# Patient Record
Sex: FEMALE | Race: WHITE
[De-identification: names, ages, dates, MRNs, and addresses within clinical notes are randomized per-mention and may not be internally consistent; named-entity substitution may affect disease eponyms.]

---

## 2017-05-25 ENCOUNTER — HOSPITAL ENCOUNTER (OUTPATIENT)
Dept: HOSPITAL 10 - RAD | Age: 77
Discharge: HOME | End: 2017-05-25
Attending: INTERNAL MEDICINE
Payer: MEDICARE

## 2017-05-25 DIAGNOSIS — R13.12: Primary | ICD-10-CM

## 2017-05-25 PROCEDURE — 92611 MOTION FLUOROSCOPY/SWALLOW: CPT

## 2017-05-25 PROCEDURE — 74230 X-RAY XM SWLNG FUNCJ C+: CPT

## 2017-05-25 NOTE — RADRPT
PROCEDURE:   Video-fluoroscopy swallowing study. 

 

CLINICAL INDICATION:   Dysphagia.

 

TECHNIQUE:   Fluoroscopic guided video swallowing study was done in conjunction with the speech ther
apist. The study was confined to the oral, pharyngeal, and cervical phases of the swallowing mechani
sm. 3.6 minutes of fluoroscopy time was used.

 

COMPARISON:   No prior study is available for comparison.

 

FINDINGS:

There is trace penetration with puree.  Mild penetration is seen with other trials.  There is no tino
dence of aspiration during the exam.

 

IMPRESSION:

1.  Penetration during swallowing.  No aspiration during swallowing.

2.  Please refer to the speech therapist's recommendations for future feedings.  

 

RPTAT: QQ

_____________________________________________ 

.Alin Gayle MD, MD           Date    Time 

Electronically viewed and signed by .Alin Gayle MD, MD on 05/25/2017 18:32 

 

D:  05/25/2017 18:32  T:  05/25/2017 18:32

.R/

## 2018-07-09 ENCOUNTER — HOSPITAL ENCOUNTER (EMERGENCY)
Dept: HOSPITAL 54 - ER | Age: 78
Discharge: HOME | End: 2018-07-09
Payer: MEDICARE

## 2018-07-09 VITALS — DIASTOLIC BLOOD PRESSURE: 84 MMHG | SYSTOLIC BLOOD PRESSURE: 147 MMHG

## 2018-07-09 VITALS — BODY MASS INDEX: 25.44 KG/M2 | WEIGHT: 149 LBS | HEIGHT: 64 IN

## 2018-07-09 DIAGNOSIS — G40.909: Primary | ICD-10-CM

## 2018-07-09 DIAGNOSIS — G30.9: ICD-10-CM

## 2018-07-09 DIAGNOSIS — F02.80: ICD-10-CM

## 2018-07-09 DIAGNOSIS — I10: ICD-10-CM

## 2018-07-09 LAB
BASOPHILS # BLD AUTO: 0 /CMM (ref 0–0.2)
BASOPHILS NFR BLD AUTO: 0.6 % (ref 0–2)
BUN SERPL-MCNC: 15 MG/DL (ref 7–18)
CALCIUM SERPL-MCNC: 8.8 MG/DL (ref 8.5–10.1)
CHLORIDE SERPL-SCNC: 102 MMOL/L (ref 98–107)
CO2 SERPL-SCNC: 29 MMOL/L (ref 21–32)
CREAT SERPL-MCNC: 0.5 MG/DL (ref 0.6–1.3)
EOSINOPHIL NFR BLD AUTO: 0.8 % (ref 0–6)
GLUCOSE SERPL-MCNC: 108 MG/DL (ref 74–106)
HCT VFR BLD AUTO: 43 % (ref 33–45)
HGB BLD-MCNC: 14.6 G/DL (ref 11.5–14.8)
LYMPHOCYTES NFR BLD AUTO: 1.9 /CMM (ref 0.8–4.8)
LYMPHOCYTES NFR BLD AUTO: 24.1 % (ref 20–44)
MCH RBC QN AUTO: 31 PG (ref 26–33)
MCHC RBC AUTO-ENTMCNC: 34 G/DL (ref 31–36)
MCV RBC AUTO: 91 FL (ref 82–100)
MONOCYTES NFR BLD AUTO: 0.4 /CMM (ref 0.1–1.3)
MONOCYTES NFR BLD AUTO: 5.1 % (ref 2–12)
NEUTROPHILS # BLD AUTO: 5.7 /CMM (ref 1.8–8.9)
NEUTROPHILS NFR BLD AUTO: 69.4 % (ref 43–81)
PH UR STRIP: 7.5 [PH] (ref 5–8)
PLATELET # BLD AUTO: 232 /CMM (ref 150–450)
POTASSIUM SERPL-SCNC: 4.2 MMOL/L (ref 3.5–5.1)
RBC # BLD AUTO: 4.68 MIL/UL (ref 4–5.2)
RBC #/AREA URNS HPF: (no result) /HPF (ref 0–2)
RDW COEFFICIENT OF VARIATION: 13.6 (ref 11.5–15)
SODIUM SERPL-SCNC: 136 MMOL/L (ref 136–145)
UROBILINOGEN UR STRIP-MCNC: 0.2 EU/DL
VALPROATE SERPL-MCNC: 57 UG/ML (ref 50–100)
WBC #/AREA URNS HPF: (no result) /HPF (ref 0–3)
WBC NRBC COR # BLD AUTO: 8.1 K/UL (ref 4.3–11)

## 2018-07-09 PROCEDURE — 85025 COMPLETE CBC W/AUTO DIFF WBC: CPT

## 2018-07-09 PROCEDURE — 51702 INSERT TEMP BLADDER CATH: CPT

## 2018-07-09 PROCEDURE — 99285 EMERGENCY DEPT VISIT HI MDM: CPT

## 2018-07-09 PROCEDURE — 70450 CT HEAD/BRAIN W/O DYE: CPT

## 2018-07-09 PROCEDURE — 71045 X-RAY EXAM CHEST 1 VIEW: CPT

## 2018-07-09 PROCEDURE — 80164 ASSAY DIPROPYLACETIC ACD TOT: CPT

## 2018-07-09 PROCEDURE — A4606 OXYGEN PROBE USED W OXIMETER: HCPCS

## 2018-07-09 PROCEDURE — 81001 URINALYSIS AUTO W/SCOPE: CPT

## 2018-07-09 PROCEDURE — Z7610: HCPCS

## 2018-07-09 PROCEDURE — 36415 COLL VENOUS BLD VENIPUNCTURE: CPT

## 2018-07-09 PROCEDURE — 80048 BASIC METABOLIC PNL TOTAL CA: CPT

## 2018-07-09 PROCEDURE — 93005 ELECTROCARDIOGRAM TRACING: CPT

## 2018-07-09 NOTE — NUR
PT COMFORTABLE SLEEPING IN BED. WILL CONTINUE TO MONITOR UNTIL PICKED UP TO BE 
TAKEN BACK TO FACILITY BY [facility]

## 2018-07-09 NOTE — NUR
[id number] FROM HOME FOR AMS, S/P SEIZURE. BS-120. PATIENT NOT IN DISTRESS. SKIN IS 
WARM TO TOUCH AND NON DIAPHORETIC. PATIENT IS AFEBRILE. ON O2 VIA NC. VSS. 
CONNECTED PT TO TELE  MONITOR. SEIZURE PRECAUTION OBSERVED./

## 2018-10-02 ENCOUNTER — HOSPITAL ENCOUNTER (INPATIENT)
Dept: HOSPITAL 54 - ER | Age: 78
LOS: 4 days | Discharge: NURSING, REHAB OR LONG-TERM CARE | DRG: 193 | End: 2018-10-06
Attending: INTERNAL MEDICINE | Admitting: INTERNAL MEDICINE
Payer: MEDICARE

## 2018-10-02 VITALS — HEIGHT: 61 IN | WEIGHT: 139.56 LBS | BODY MASS INDEX: 26.35 KG/M2

## 2018-10-02 DIAGNOSIS — S30.820A: ICD-10-CM

## 2018-10-02 DIAGNOSIS — I10: ICD-10-CM

## 2018-10-02 DIAGNOSIS — F02.80: ICD-10-CM

## 2018-10-02 DIAGNOSIS — J15.9: Primary | ICD-10-CM

## 2018-10-02 DIAGNOSIS — G30.9: ICD-10-CM

## 2018-10-02 DIAGNOSIS — G92: ICD-10-CM

## 2018-10-02 DIAGNOSIS — I95.9: ICD-10-CM

## 2018-10-02 DIAGNOSIS — E86.0: ICD-10-CM

## 2018-10-02 DIAGNOSIS — E87.1: ICD-10-CM

## 2018-10-02 DIAGNOSIS — E88.09: ICD-10-CM

## 2018-10-02 DIAGNOSIS — R79.89: ICD-10-CM

## 2018-10-02 DIAGNOSIS — E87.5: ICD-10-CM

## 2018-10-02 DIAGNOSIS — E44.1: ICD-10-CM

## 2018-10-02 DIAGNOSIS — N39.0: ICD-10-CM

## 2018-10-02 DIAGNOSIS — D72.829: ICD-10-CM

## 2018-10-02 DIAGNOSIS — M81.0: ICD-10-CM

## 2018-10-02 DIAGNOSIS — G40.909: ICD-10-CM

## 2018-10-02 LAB
ALBUMIN SERPL BCP-MCNC: 3.1 G/DL (ref 3.4–5)
ALP SERPL-CCNC: 87 U/L (ref 46–116)
ALT SERPL W P-5'-P-CCNC: 19 U/L (ref 12–78)
APPEARANCE UR: (no result)
APTT PPP: 26 SEC (ref 23–34)
AST SERPL W P-5'-P-CCNC: 16 U/L (ref 15–37)
BASOPHILS # BLD AUTO: 0.1 /CMM (ref 0–0.2)
BASOPHILS NFR BLD AUTO: 1.6 % (ref 0–2)
BILIRUB DIRECT SERPL-MCNC: 0.1 MG/DL (ref 0–0.2)
BILIRUB SERPL-MCNC: 0.2 MG/DL (ref 0.2–1)
BILIRUB UR QL STRIP: NEGATIVE
BUN SERPL-MCNC: 25 MG/DL (ref 7–18)
CALCIUM SERPL-MCNC: 8.5 MG/DL (ref 8.5–10.1)
CHLORIDE SERPL-SCNC: 98 MMOL/L (ref 98–107)
CO2 SERPL-SCNC: 24 MMOL/L (ref 21–32)
COLOR UR: YELLOW
CREAT SERPL-MCNC: 0.5 MG/DL (ref 0.6–1.3)
EOSINOPHIL NFR BLD AUTO: 0.3 % (ref 0–6)
GLUCOSE SERPL-MCNC: 95 MG/DL (ref 74–106)
GLUCOSE UR STRIP-MCNC: NEGATIVE MG/DL
HCT VFR BLD AUTO: 42 % (ref 33–45)
HGB BLD-MCNC: 14.1 G/DL (ref 11.5–14.8)
HGB UR QL STRIP: (no result) ERY/UL
INR PPP: 1.1 (ref 0.87–1.13)
KETONES UR STRIP-MCNC: NEGATIVE MG/DL
LEUKOCYTE ESTERASE UR QL STRIP: (no result)
LYMPHOCYTES NFR BLD AUTO: 2 /CMM (ref 0.8–4.8)
LYMPHOCYTES NFR BLD AUTO: 26.6 % (ref 20–44)
MCHC RBC AUTO-ENTMCNC: 34 G/DL (ref 31–36)
MCV RBC AUTO: 95 FL (ref 82–100)
MONOCYTES NFR BLD AUTO: 0.6 /CMM (ref 0.1–1.3)
MONOCYTES NFR BLD AUTO: 8.2 % (ref 2–12)
NEUTROPHILS # BLD AUTO: 4.9 /CMM (ref 1.8–8.9)
NEUTROPHILS NFR BLD AUTO: 63.3 % (ref 43–81)
NITRITE UR QL STRIP: NEGATIVE
PH UR STRIP: 7 [PH] (ref 5–8)
PLATELET # BLD AUTO: 215 /CMM (ref 150–450)
POTASSIUM SERPL-SCNC: 5.2 MMOL/L (ref 3.5–5.1)
PROT SERPL-MCNC: 6.8 G/DL (ref 6.4–8.2)
PROT UR QL STRIP: NEGATIVE MG/DL
RBC # BLD AUTO: 4.39 MIL/UL (ref 4–5.2)
RBC #/AREA URNS HPF: (no result) /HPF (ref 0–2)
RDW COEFFICIENT OF VARIATION: 13.4 (ref 11.5–15)
SODIUM SERPL-SCNC: 132 MMOL/L (ref 136–145)
TROPONIN I SERPL-MCNC: 0.03 NG/ML (ref 0–0.06)
UROBILINOGEN UR STRIP-MCNC: 0.2 EU/DL
WBC #/AREA URNS HPF: (no result) /HPF (ref 0–3)
WBC NRBC COR # BLD AUTO: 7.7 K/UL (ref 4.3–11)

## 2018-10-02 PROCEDURE — Z7610: HCPCS

## 2018-10-02 PROCEDURE — A4606 OXYGEN PROBE USED W OXIMETER: HCPCS

## 2018-10-02 NOTE — NUR
[facility] C/O FEVER AT HOME, RECENT UTI ON ATB TREATMENT. NO FEVER ON ARRIVAL. 
TYLENOL WAS GIVEN EARLIER TODAY AFTER TEMP OF 99.1. GCS 9-10. SKIN WARM AND DRY 
TO TOUCH. RR EVEN AND UNLABORED. NAD NOTED. PT PLACED ON CARDIAC MONITOR AND 
POX. PT SAFETY AND COMFORT MEASURES IN PLACE. PT ON NC 3L/M FROM FACILITY. MD 
BEDSIDE FOR EVAL

## 2018-10-03 VITALS — SYSTOLIC BLOOD PRESSURE: 87 MMHG | DIASTOLIC BLOOD PRESSURE: 51 MMHG

## 2018-10-03 VITALS — SYSTOLIC BLOOD PRESSURE: 113 MMHG | DIASTOLIC BLOOD PRESSURE: 61 MMHG

## 2018-10-03 VITALS — SYSTOLIC BLOOD PRESSURE: 127 MMHG | DIASTOLIC BLOOD PRESSURE: 80 MMHG

## 2018-10-03 VITALS — DIASTOLIC BLOOD PRESSURE: 64 MMHG | SYSTOLIC BLOOD PRESSURE: 105 MMHG

## 2018-10-03 VITALS — DIASTOLIC BLOOD PRESSURE: 48 MMHG | SYSTOLIC BLOOD PRESSURE: 106 MMHG

## 2018-10-03 VITALS — DIASTOLIC BLOOD PRESSURE: 71 MMHG | SYSTOLIC BLOOD PRESSURE: 107 MMHG

## 2018-10-03 VITALS — DIASTOLIC BLOOD PRESSURE: 80 MMHG | SYSTOLIC BLOOD PRESSURE: 127 MMHG

## 2018-10-03 RX ADMIN — DEXTROSE MONOHYDRATE PRN MLS/HR: 50 INJECTION, SOLUTION INTRAVENOUS at 17:47

## 2018-10-03 RX ADMIN — DEXTROSE MONOHYDRATE PRN MLS/HR: 50 INJECTION, SOLUTION INTRAVENOUS at 02:31

## 2018-10-03 RX ADMIN — DEXTROSE MONOHYDRATE SCH MLS/HR: 50 INJECTION, SOLUTION INTRAVENOUS at 13:11

## 2018-10-03 NOTE — NUR
RN NOTES

 PATIENT STABLE, NO ACUTE RESPIRATORY DISTRESS, V/S STABLE, SCHEDULED MEDICATION 
ADMINISTERED, NEEDS ATTENDED AND ANTICIPATED. ASSIST TURN AND REPOSTION Q 2 HR.  CALL LIGHT 
WITHIN TO REACH, PRIVATE CARE GIVER NEXT TO THE BED. ENDORSED ONCOMING NURSE FOR PLAN OF 
CARE.

## 2018-10-03 NOTE — NUR
rn notes

 RECEIVED PATIENT Ukrainian SPEAKER IN THE BED EYES CLOSE  ALL THE TIME, PATIENT NON VERBAL, 
BUT REDIRECTABLE.PATIENT ON O2-2L NC NO CUTE RESPIRATORY DISTRESS, V/S TAKEN STABLE, NEEDS 
ATTENDED AND ANTICIPATED. SCHEDULED MEDICATION ADMINISTERED. ASSIST TURN AND REPOSTION Q 
2HR, ASSIST EATING, KEEP HOB ELEVATED FOR ASPIRATION PRECAUTION. INFUSING NS AT 75 ML/HR 
INTACT. PATIENT INCONTINENT, APPLIED Z-GUARD. CALL LIGHT WITHIN TO REACH, PRIVATE CARE GIVER 
NEXT TO THE BED. CONTINUED MONITORING.

## 2018-10-03 NOTE — NUR
RN NOTES

PATIENT BACK FROM CT. NO ACUTE RESPIRATORY DISTRESS, CALL LIGHT WITHIN TO REACH,  SAFETY 
PRECAUTION MAINTAINED ALL THE TIME. ASSIST TURN AND REPOSTION.

## 2018-10-03 NOTE — NUR
TELE RN ADMISSION NOTES



Patient came to unit via gurney. Patient's eyes are open but non-verbal. Son with patient. 
Breathing even and unlabored. Not in any distress. On O2 at 2L via NC saturating 98%. 
Patient on tele monitor, sinus rhythm 75. History taken from son as patient not able to 
answer. IV site on right hand g#20 intact and patent. According to son, patient does not 
talk since her dementia worsened. Patient does not ambulate but has physical therapy 
sessions at the nursing home twice a week. She is on pureed, low fat, low cholesterol, thin 
liquids in the nursing home. Pills are crushed and given with applesauce. Medications from 
nursing home entered for reconciliation. PT/ST/CM consult ordered. Son requested to stay 
through the night. Oriented to call bell. Padded side rails for seizure precaution in place. 
Bed in lowest, locked position. Will monitor accordingly

## 2018-10-03 NOTE — NUR
MS RN NOTES

RECEIVED PT IN BED, RESTING COMFORTABLY AT THIS TIME, AROUSABLE, NON VERBAL. NO DISTRESS, NO 
SOB NOTED. DAUGHTER AND PRIVATE CAREGIVER AT BEDSIDE. RESPIRATION IS EVEN AND UNLABORED. IV 
SITE ON RIGHT HAND INTACT AND PATENT, IVF INFUSING WELL. CAREGIVER FEEDING PT AT THIS TIME, 
EDUCATED ON ASPIRATION PRECAUTIONS , VERBALIZED UNDERSTANDING. ALL NEEDS ATTENDED AND MET. 
KEPT COMFORTABLE. SAFETY , ASPIRATION AND SEIZURE PRECAUTION OBSERVED. CALL LIGHT WITHIN 
REACH. WILL CONTINUE TO MONITOR.

## 2018-10-03 NOTE — NUR
TELE RN CLOSING NOTES



Patient still sleeping in bed, non- verbal. Breathing even and unlabored. Not in any 
distress. Son at bedside. Peripheral IV infusing at 75mL/hr. All needs attended to. Turned 
and reposition as needed. Tele monitor in place, sinus rhythm 73. Dr. Reyes made aware of 
medications that needs to be reconciled. Will endorse STACEY to oncoming RN

## 2018-10-04 VITALS — DIASTOLIC BLOOD PRESSURE: 54 MMHG | SYSTOLIC BLOOD PRESSURE: 95 MMHG

## 2018-10-04 VITALS — DIASTOLIC BLOOD PRESSURE: 67 MMHG | SYSTOLIC BLOOD PRESSURE: 125 MMHG

## 2018-10-04 VITALS — DIASTOLIC BLOOD PRESSURE: 78 MMHG | SYSTOLIC BLOOD PRESSURE: 146 MMHG

## 2018-10-04 LAB
BASOPHILS # BLD AUTO: 0.1 /CMM (ref 0–0.2)
BASOPHILS NFR BLD AUTO: 1.2 % (ref 0–2)
BUN SERPL-MCNC: 6 MG/DL (ref 7–18)
CALCIUM SERPL-MCNC: 8.5 MG/DL (ref 8.5–10.1)
CHLORIDE SERPL-SCNC: 104 MMOL/L (ref 98–107)
CO2 SERPL-SCNC: 27 MMOL/L (ref 21–32)
CREAT SERPL-MCNC: 0.4 MG/DL (ref 0.6–1.3)
EOSINOPHIL NFR BLD AUTO: 5.8 % (ref 0–6)
GLUCOSE SERPL-MCNC: 89 MG/DL (ref 74–106)
HCT VFR BLD AUTO: 37 % (ref 33–45)
HGB BLD-MCNC: 12 G/DL (ref 11.5–14.8)
LYMPHOCYTES NFR BLD AUTO: 1.8 /CMM (ref 0.8–4.8)
LYMPHOCYTES NFR BLD AUTO: 34.7 % (ref 20–44)
MAGNESIUM SERPL-MCNC: 2 MG/DL (ref 1.8–2.4)
MCHC RBC AUTO-ENTMCNC: 33 G/DL (ref 31–36)
MCV RBC AUTO: 98 FL (ref 82–100)
MONOCYTES NFR BLD AUTO: 0.3 /CMM (ref 0.1–1.3)
MONOCYTES NFR BLD AUTO: 5.9 % (ref 2–12)
NEUTROPHILS # BLD AUTO: 2.7 /CMM (ref 1.8–8.9)
NEUTROPHILS NFR BLD AUTO: 52.4 % (ref 43–81)
PHOSPHATE SERPL-MCNC: 3.8 MG/DL (ref 2.5–4.9)
PLATELET # BLD AUTO: 127 /CMM (ref 150–450)
POTASSIUM SERPL-SCNC: 4 MMOL/L (ref 3.5–5.1)
RBC # BLD AUTO: 3.74 MIL/UL (ref 4–5.2)
RDW COEFFICIENT OF VARIATION: 13 (ref 11.5–15)
SODIUM SERPL-SCNC: 137 MMOL/L (ref 136–145)
WBC NRBC COR # BLD AUTO: 5.2 K/UL (ref 4.3–11)

## 2018-10-04 RX ADMIN — DONEPEZIL HYDROCHLORIDE SCH MG: 5 TABLET ORAL at 22:09

## 2018-10-04 RX ADMIN — DEXTROSE MONOHYDRATE PRN MLS/HR: 50 INJECTION, SOLUTION INTRAVENOUS at 10:36

## 2018-10-04 RX ADMIN — DEXTROSE MONOHYDRATE SCH MLS/HR: 50 INJECTION, SOLUTION INTRAVENOUS at 11:18

## 2018-10-04 RX ADMIN — MEMANTINE HYDROCHLORIDE SCH MG: 5 TABLET ORAL at 16:12

## 2018-10-04 NOTE — NUR
MS RN CLOSING NOTES

PT IN BED, ASLEEP AT THIS TIME, APPEARS COMFORTABLE. AROUSABLE, REMAINS NON VERBAL. NO 
DISTRESS, NO SOB NOTED. DAUGHTER AT BEDSIDE. RESPIRATION IS EVEN AND UNLABORED. IV SITE ON 
RIGHT HAND INTACT AND PATENT, IVF INFUSING WELL. ALL NEEDS ATTENDED AND MET. KEPT 
COMFORTABLE. SAFETY , ASPIRATION AND SEIZURE PRECAUTION OBSERVED. CALL LIGHT WITHIN REACH. 
WILL ENDORSE TO NEXT SHIFT ACCORDINGLY FOR STACEY.

## 2018-10-04 NOTE — NUR
MS RN OPENING NOTES



RECEIVED PT LAYING IN BED, SLEEPING COMFORTABLY. DAUGHTER AT BEDSIDE. PT IS NONVERBAL, 
RESPONSIVE TO VERBAL AND TACTILE STIMULI WITH SPONTANEOUS EYE OPENING. RESPIRATIONS ARE EVEN 
AND UNLABORED, NOT IN ANY ACUTE DISTRESS NOTED. NO FACIAL GRIMACING OR MOANING NOTED. IV 
SITE TO R. HAND INTACT, NO INFILTRATION NOTED. DRESSING KEPT CLEAN AND DRY. SAFETY MEASURES 
ARE IN PLACE. INSTRUCTED PT AND DAUGHTER TO USE CALL LIGHT WHEN ASSISTANCE IS NEEDED, CALL 
LIGHT IS LEFT WITHIN REACH. WILL CONTINUE TO MONITOR THROUGHOUT SHIFT FOR CONTINUITY OF 
CARE.

## 2018-10-04 NOTE — NUR
MS RN CLOSING NOTES



ALL DUE MEDS GIVEN, NEEDS MET AND RENDERED. PT REMAINS NONVERBAL, OPENS EYES. RESPIRATIONS 
ARE EVEN AND UNLABORED, NOT IN ANY ACUTE DISTRESS NOTED. NO FACIAL GRIMACING OR MOANING 
NOTED. IV SITE INTACT, NO INFILTRATION NOTED. DRESSING KEPT CLEAN AND DRY. IV FLUIDS RUNNING 
AT 75ML/HR, TOLERATING WELL.  CAREGIVER AT BEDSIDE. REPOSITIONED PER PROTOCOL. SAFETY  
MEASURES ARE IN PLACE. WILL ENDORSE TO NEXT SHIFT FOR CONTINUITY OF CARE.

## 2018-10-04 NOTE — NUR
MS RN NOTES

RECEIVED PT IN BED, RESTING COMFORTABLY AT THIS TIME, AROUSABLE, NON VERBAL. NO DISTRESS, NO 
SOB NOTED. PRIVATE CAREGIVER AT BEDSIDE. RESPIRATION IS EVEN AND UNLABORED. IV SITE ON RIGHT 
HAND INTACT AND PATENT, IVF INFUSING WELL. GOOD LIGIA CARE RENDERED. ALL NEEDS ATTENDED AND 
MET. KEPT COMFORTABLE. SAFETY , ASPIRATION AND SEIZURE PRECAUTION OBSERVED. CALL LIGHT 
WITHIN REACH. WILL CONTINUE TO MONITOR.

## 2018-10-05 VITALS — SYSTOLIC BLOOD PRESSURE: 152 MMHG | DIASTOLIC BLOOD PRESSURE: 89 MMHG

## 2018-10-05 VITALS — SYSTOLIC BLOOD PRESSURE: 122 MMHG | DIASTOLIC BLOOD PRESSURE: 73 MMHG

## 2018-10-05 VITALS — SYSTOLIC BLOOD PRESSURE: 156 MMHG | DIASTOLIC BLOOD PRESSURE: 86 MMHG

## 2018-10-05 RX ADMIN — VITAMIN D, TAB 1000IU (100/BT) SCH UNIT: 25 TAB at 10:30

## 2018-10-05 RX ADMIN — ASPIRIN 81 MG SCH MG: 81 TABLET ORAL at 10:30

## 2018-10-05 RX ADMIN — DONEPEZIL HYDROCHLORIDE SCH MG: 5 TABLET ORAL at 22:39

## 2018-10-05 RX ADMIN — DEXTROSE MONOHYDRATE PRN MLS/HR: 50 INJECTION, SOLUTION INTRAVENOUS at 07:44

## 2018-10-05 RX ADMIN — MEMANTINE HYDROCHLORIDE SCH MG: 5 TABLET ORAL at 10:30

## 2018-10-05 RX ADMIN — MEMANTINE HYDROCHLORIDE SCH MG: 5 TABLET ORAL at 17:54

## 2018-10-05 RX ADMIN — DEXTROSE MONOHYDRATE SCH MLS/HR: 50 INJECTION, SOLUTION INTRAVENOUS at 12:07

## 2018-10-05 RX ADMIN — DEXTROSE MONOHYDRATE PRN MLS/HR: 50 INJECTION, SOLUTION INTRAVENOUS at 22:43

## 2018-10-05 NOTE — NUR
MS RN CLOSING NOTES

PT IN BED, ASLEEP AT THIS TIME, AROUSABLE, NON VERBAL. NO DISTRESS, NO SOB NOTED. 
RESPIRATION IS EVEN AND UNLABORED. IV SITE ON RIGHT HAND INTACT AND PATENT, IVF INFUSING 
WELL. AM CARE RENDERED. ALL NEEDS ANTICIPATED,  ATTENDED AND MET. KEPT COMFORTABLE. SAFETY , 
ASPIRATION AND SEIZURE PRECAUTION OBSERVED. NO S/SOF PAIN OR DISCOMFORT NOTED.  CALL LIGHT 
WITHIN REACH. WILL CONTINUE TO MONITOR.

## 2018-10-05 NOTE — NUR
RN CLOSING NOTES



PT. IN BED A&OXO, PT. IS NON VERBAL, AND CAREGIVER IS AT BEDSIDE. BREATHING UNLABORED ON 
ROOM AIR. NO S/S OF ACUTE DISTRESS. IV FLUIDS RUNNING AT 75 ML/HR. BED IS IN LOWEST, AND 
LOCKED POSITION. 2 SIDE RAILS UP, AND CALL LIGHT IS WITHIN REACH. WILL ENDORSE REPORT TO 
NURSE.

## 2018-10-05 NOTE — NUR
RN OPENING NOTES



 RECEIVED PT. IN BED A&OXO, PT. IS NON VERBAL. BREATHING ON OXYGEN AT 3L/MIN VIA NASAL 
CANNULA. NO S/S OF ACUTE DISTRESS. IV FLUIDS RUNNING AT 75 ML/HR. BED IS IN LOWEST, AND 
LOCKED POSITION. 2 SIDE RAILS UP, AND CALL LIGHT IS WITHIN REACH.

## 2018-10-05 NOTE — NUR
MS RN NOTES

RECEIVED PT IN BED, PT'S EYES CLOSED , AROUSABLE, NON VERBAL. NO DISTRESS, NO SOB NOTED. 
PRIVATE CAREGIVER AT BEDSIDE, FEEDING THE PT, EDUCATED CAREGIVER ON ASPIRATION PRECAUTIONS, 
AND NOT TO FEED PT FAST, CAREGIVER VERBALIZED UNDERSTANDING. PLACED PT'S HEAD ON 90 DEGREES. 
 RESPIRATION IS EVEN AND UNLABORED. IV SITE ON RIGHT HAND INTACT AND PATENT, IVF INFUSING 
WELL. ALL NEEDS ANTICIPATED AND ATTENDED . KEPT COMFORTABLE. SAFETY , ASPIRATION AND SEIZURE 
PRECAUTION OBSERVED. CALL LIGHT WITHIN REACH. WILL CONTINUE TO MONITOR.

## 2018-10-06 VITALS — DIASTOLIC BLOOD PRESSURE: 61 MMHG | SYSTOLIC BLOOD PRESSURE: 97 MMHG

## 2018-10-06 VITALS — DIASTOLIC BLOOD PRESSURE: 67 MMHG | SYSTOLIC BLOOD PRESSURE: 134 MMHG

## 2018-10-06 RX ADMIN — DEXTROSE MONOHYDRATE SCH MLS/HR: 50 INJECTION, SOLUTION INTRAVENOUS at 11:50

## 2018-10-06 RX ADMIN — MEMANTINE HYDROCHLORIDE SCH MG: 5 TABLET ORAL at 17:17

## 2018-10-06 RX ADMIN — VITAMIN D, TAB 1000IU (100/BT) SCH UNIT: 25 TAB at 08:24

## 2018-10-06 RX ADMIN — MEMANTINE HYDROCHLORIDE SCH MG: 5 TABLET ORAL at 08:24

## 2018-10-06 RX ADMIN — ASPIRIN 81 MG SCH MG: 81 TABLET ORAL at 08:24

## 2018-10-06 NOTE — NUR
DISCHARGED



PT. WAS DISCHARGED TO [facility]. REPORT WAS GIVEN VIA PHONE. DISCHARGE INSTRUCTIONS 
WERE PROVIDED ON REPORT. IV AND ID WAS REMOVED WITHOUT COMPLICATIONS. DISCHARGE PACKET AND 
REPORT WAS GIVEN TO AMBULANCE CREW. PT. LEFT WITH HER SON, CLAIRE AND CAREGIVER. BELONGINGS 
LIST WAS CHECKED AND SIGNED. PT. LEFT IN STABLE CONDITION BY AMBULANCE.

## 2018-10-06 NOTE — NUR
MS RN CLOSING NOTES

PT IN BED, RESTING COMFORTABLY , AROUSABLE, NON VERBAL. NO DISTRESS, NO SOB NOTED. 
RESPIRATION IS EVEN AND UNLABORED. IV SITE ON RIGHT HAND INTACT AND PATENT, IVF INFUSING 
WELL. ALL NEEDS ANTICIPATED AND ATTENDED . KEPT COMFORTABLE. SAFETY , ASPIRATION AND SEIZURE 
PRECAUTION OBSERVED. CALL LIGHT WITHIN REACH. WILL ENDORSE TO NEXT SHIFT FOR STACEY.

## 2018-10-06 NOTE — NUR
RN OPENING NOTES



RECEIVED PT. IN BED A&OXO, PT. IS NON VERBAL, AND SLEEPING. BREATHING UNLABORED ON OXYGEN AT 
3L/MIN VIA NASAL CANNULA. NO S/S OF ACUTE DISTRESS. BED IS IN LOWEST, AND LOCKED POSITION. 2 
SIDE RAILS UP, AND CALL LIGHT IS WITHIN REACH. WILL CONTINUE TO ASSESS AND MONITOR.

## 2018-10-16 ENCOUNTER — OFFICE (OUTPATIENT)
Dept: URBAN - METROPOLITAN AREA CLINIC 45 | Facility: CLINIC | Age: 78
End: 2018-10-16

## 2018-10-16 VITALS — HEIGHT: 60 IN | WEIGHT: 138 LBS

## 2018-10-16 DIAGNOSIS — R11.10: ICD-10-CM

## 2018-10-16 DIAGNOSIS — K59.04 CHRONIC IDIOPATHIC CONSTIPATION: ICD-10-CM

## 2018-10-16 DIAGNOSIS — F03.90 DEMENTIA: ICD-10-CM

## 2018-10-16 PROCEDURE — 99204 OFFICE O/P NEW MOD 45 MIN: CPT | Performed by: INTERNAL MEDICINE

## 2018-10-16 NOTE — SERVICEHPINOTES
The patient presents to the office accompanied by her daughter and caretakerFONT style="BACKGROUND-COLOR: #ffffff" visited="true".  She is a resident of a local nursing home.  The patient is nonverbal and cannot provide any history due to underlying seizure disorder and Alzheimer's dementia.  According to the patient's daughter and caretaker, she was having episodes of/FONT   postprandial cough and regurgitation several weeks ago. The patient subsequently had bedside swallow evaluation and was switched to puréed diet with thickened liquids. This apparently quickly improved her symptoms..  She is also reportedly having problems with constipation, lately managed by combination of daily MiraLAX and Dulcolax suppository given to her about twice a week.

## 2019-04-21 ENCOUNTER — HOSPITAL ENCOUNTER (INPATIENT)
Dept: HOSPITAL 54 - ER | Age: 79
LOS: 8 days | Discharge: NURSING, REHAB OR LONG-TERM CARE | DRG: 871 | End: 2019-04-29
Attending: INTERNAL MEDICINE | Admitting: INTERNAL MEDICINE
Payer: MEDICARE

## 2019-04-21 VITALS — SYSTOLIC BLOOD PRESSURE: 105 MMHG | DIASTOLIC BLOOD PRESSURE: 68 MMHG

## 2019-04-21 VITALS — SYSTOLIC BLOOD PRESSURE: 113 MMHG | DIASTOLIC BLOOD PRESSURE: 79 MMHG

## 2019-04-21 VITALS — DIASTOLIC BLOOD PRESSURE: 78 MMHG | SYSTOLIC BLOOD PRESSURE: 110 MMHG

## 2019-04-21 VITALS — WEIGHT: 130 LBS | BODY MASS INDEX: 25.52 KG/M2 | HEIGHT: 60 IN

## 2019-04-21 VITALS — SYSTOLIC BLOOD PRESSURE: 97 MMHG | DIASTOLIC BLOOD PRESSURE: 68 MMHG

## 2019-04-21 VITALS — DIASTOLIC BLOOD PRESSURE: 78 MMHG | SYSTOLIC BLOOD PRESSURE: 113 MMHG

## 2019-04-21 DIAGNOSIS — I50.32: ICD-10-CM

## 2019-04-21 DIAGNOSIS — N39.0: ICD-10-CM

## 2019-04-21 DIAGNOSIS — A41.9: Primary | ICD-10-CM

## 2019-04-21 DIAGNOSIS — G40.909: ICD-10-CM

## 2019-04-21 DIAGNOSIS — J69.0: ICD-10-CM

## 2019-04-21 DIAGNOSIS — I35.0: ICD-10-CM

## 2019-04-21 DIAGNOSIS — L98.8: ICD-10-CM

## 2019-04-21 DIAGNOSIS — K21.9: ICD-10-CM

## 2019-04-21 DIAGNOSIS — I11.0: ICD-10-CM

## 2019-04-21 DIAGNOSIS — J15.6: ICD-10-CM

## 2019-04-21 DIAGNOSIS — L89.899: ICD-10-CM

## 2019-04-21 DIAGNOSIS — F02.80: ICD-10-CM

## 2019-04-21 DIAGNOSIS — J96.01: ICD-10-CM

## 2019-04-21 DIAGNOSIS — Z88.2: ICD-10-CM

## 2019-04-21 DIAGNOSIS — L89.010: ICD-10-CM

## 2019-04-21 DIAGNOSIS — G93.41: ICD-10-CM

## 2019-04-21 DIAGNOSIS — D64.9: ICD-10-CM

## 2019-04-21 DIAGNOSIS — G30.9: ICD-10-CM

## 2019-04-21 DIAGNOSIS — B96.5: ICD-10-CM

## 2019-04-21 DIAGNOSIS — E87.2: ICD-10-CM

## 2019-04-21 DIAGNOSIS — R13.10: ICD-10-CM

## 2019-04-21 LAB
ALBUMIN SERPL BCP-MCNC: 3.3 G/DL (ref 3.4–5)
ALP SERPL-CCNC: 118 U/L (ref 46–116)
ALT SERPL W P-5'-P-CCNC: 17 U/L (ref 12–78)
AST SERPL W P-5'-P-CCNC: 18 U/L (ref 15–37)
BASOPHILS # BLD AUTO: 0.1 /CMM (ref 0–0.2)
BASOPHILS NFR BLD AUTO: 1 % (ref 0–2)
BILIRUB DIRECT SERPL-MCNC: 0.1 MG/DL (ref 0–0.2)
BILIRUB SERPL-MCNC: 0.3 MG/DL (ref 0.2–1)
BILIRUB UR QL STRIP: (no result)
BUN SERPL-MCNC: 16 MG/DL (ref 7–18)
CALCIUM SERPL-MCNC: 8.9 MG/DL (ref 8.5–10.1)
CHLORIDE SERPL-SCNC: 101 MMOL/L (ref 98–107)
CO2 SERPL-SCNC: 26 MMOL/L (ref 21–32)
CREAT SERPL-MCNC: 0.6 MG/DL (ref 0.6–1.3)
EOSINOPHIL NFR BLD AUTO: 0.2 % (ref 0–6)
GLUCOSE SERPL-MCNC: 148 MG/DL (ref 74–106)
HCT VFR BLD AUTO: 44 % (ref 33–45)
HGB BLD-MCNC: 14.6 G/DL (ref 11.5–14.8)
KETONES UR STRIP-MCNC: 15 MG/DL
LYMPHOCYTES NFR BLD AUTO: 0.8 /CMM (ref 0.8–4.8)
LYMPHOCYTES NFR BLD AUTO: 9.2 % (ref 20–44)
MCHC RBC AUTO-ENTMCNC: 33 G/DL (ref 31–36)
MCV RBC AUTO: 92 FL (ref 82–100)
MONOCYTES NFR BLD AUTO: 0.3 /CMM (ref 0.1–1.3)
MONOCYTES NFR BLD AUTO: 3.8 % (ref 2–12)
NEUTROPHILS # BLD AUTO: 7.6 /CMM (ref 1.8–8.9)
NEUTROPHILS NFR BLD AUTO: 85.8 % (ref 43–81)
NT-PROBNP SERPL-MCNC: 1313 PG/ML (ref 0–125)
PH UR STRIP: 5.5 [PH] (ref 5–8)
PLATELET # BLD AUTO: 277 /CMM (ref 150–450)
POTASSIUM SERPL-SCNC: 3.9 MMOL/L (ref 3.5–5.1)
PROT SERPL-MCNC: 7.6 G/DL (ref 6.4–8.2)
PROT UR QL STRIP: 100 MG/DL
RBC # BLD AUTO: 4.75 MIL/UL (ref 4–5.2)
SODIUM SERPL-SCNC: 137 MMOL/L (ref 136–145)
UROBILINOGEN UR STRIP-MCNC: 0.2 EU/DL
WBC #/AREA URNS HPF: (no result) /HPF (ref 0–3)
WBC NRBC COR # BLD AUTO: 8.8 K/UL (ref 4.3–11)

## 2019-04-21 PROCEDURE — A6403 STERILE GAUZE>16 <= 48 SQ IN: HCPCS

## 2019-04-21 PROCEDURE — G0378 HOSPITAL OBSERVATION PER HR: HCPCS

## 2019-04-21 RX ADMIN — Medication SCH UNIT: at 08:38

## 2019-04-21 RX ADMIN — Medication SCH ML: at 09:28

## 2019-04-21 RX ADMIN — FAMOTIDINE SCH MG: 20 TABLET, FILM COATED ORAL at 08:37

## 2019-04-21 RX ADMIN — CALCIUM CARBONATE (ANTACID) CHEW TAB 500 MG SCH MG: 500 CHEW TAB at 08:38

## 2019-04-21 RX ADMIN — SODIUM CHLORIDE SCH MLS/HR: 9 INJECTION, SOLUTION INTRAVENOUS at 21:25

## 2019-04-21 RX ADMIN — Medication SCH MG: at 08:37

## 2019-04-21 RX ADMIN — Medication SCH MG: at 21:03

## 2019-04-21 RX ADMIN — Medication SCH MG: at 16:07

## 2019-04-21 RX ADMIN — Medication SCH MG: at 13:00

## 2019-04-21 RX ADMIN — PIPERACILLIN SODIUM AND TAZOBACTAM SODIUM SCH MLS/HR: .375; 3 INJECTION, POWDER, LYOPHILIZED, FOR SOLUTION INTRAVENOUS at 13:15

## 2019-04-21 RX ADMIN — VITAMIN D, TAB 1000IU (100/BT) SCH UNIT: 25 TAB at 08:38

## 2019-04-21 RX ADMIN — PIPERACILLIN SODIUM AND TAZOBACTAM SODIUM SCH MLS/HR: .375; 3 INJECTION, POWDER, LYOPHILIZED, FOR SOLUTION INTRAVENOUS at 19:46

## 2019-04-21 RX ADMIN — FERROUS SULFATE TAB 325 MG (65 MG ELEMENTAL FE) SCH MG: 325 (65 FE) TAB at 08:00

## 2019-04-21 RX ADMIN — Medication SCH TAB: at 08:37

## 2019-04-21 RX ADMIN — Medication SCH EACH: at 08:37

## 2019-04-21 RX ADMIN — Medication SCH ML: at 17:32

## 2019-04-21 RX ADMIN — Medication SCH MG: at 21:00

## 2019-04-21 NOTE — NUR
M/S RN CLOSING NOTES



PATIENT ON BED IN SUPINE POSITION WITH HEAD OF BED ELEVATED, NON VERBAL AND NON RESPONSIVE, 
ABLE TO OPEN EYES AT TIMES. RESPIRATION EVEN AND NON LABORED WITH NO ACUTE RESPIRATORY 
DISTRESS, ON OXYGEN AT 4LPM VIA NASAL CANNULA AND ABLE TO TOLERATE WELL SATING 98%. ABDOMEN 
SOFT AND NON DISTENDED WITH ACTIVE BOWEL SOUNDS TO ALL QUADRANTS, FC PRESENT WITH CLEAR 
YELLOW URINE WITH OUTPUT 550 ML. SKIN WARM TO TOUCH AND DRY. PROVIDED TREATMENT TO WOUND 
SITES AND WAITING FOR WOUND CONSULT FOR ANY FURTHER TREATMENT. PATIENT HAS NO S/SX OF PAIN 
AND DISCOMFORT. IV SITE AT LEFT HAND GAUGE 18 WITH NO S/SX OF INFILTRATION, REMAINED PATIENT 
IN FLUSHING. PATIENT STILL ON NPO. ALL CONCERNS ADDRESSED. FAMILY MEMBER PRESENT ON BEDSIDE. 
ENDORSED PATIENT CARE TO NEXT SHIFT.

## 2019-04-21 NOTE — NUR
RN MS OPENING NOTES 

RECEIVED REPORT BEDSIDE. PT IN BED AWAKE ALERT ORIENTED X1, FAMILY AT BEDSIDE, BREATHING 
EVEN AND UNLABORED ON 4L OF 02 VIA NC AND TOLERATING. NO COUGH OR CONGESTION NOTED AT THE 
MOMENT. WILL CONTINUE TO MONITOR. IV ACCESS ON THE L HAND 18G SL PATENT AND FLUSHING. F.C IN 
PLACE, NPO, POSSIBLE PROCEDURE IN AM. BED IN LOWEST LOCKED POSITION, CALL LIGHT WITHIN REACH 
AT ALL TIMES, WILL CONTINUE TO MONITOR.

## 2019-04-21 NOTE — NUR
BIBRA. C/O "FEELING SHORT OF BREATH, NOT ACTING HERSELF" SOB NOTED. UNABLE TO 
ASSESS PAIN LEVEL. VSS MD AT BEDSIDE

## 2019-04-21 NOTE — NUR
TELE RN OPENING NOTES



RECEIVED PATIENT ON BED IN SUPINE POSITION, NON VERBAL AND NON RESPONSIVE AS BASELINE 
BEHAVIOR PER FAMILY. CLAIRE (SON) PRESENT ON BEDSIDE. RESPIRATION EVEN AND NON LABORED WITH NO 
ACUTE RESPIRATORY DISTRESS, ON OXYGEN AT 4LPM VIA NASAL CANNULA AND ABLE TO TOLERATE WELL. 
ABDOMEN SOFT AND NON DISTENDED WITH ACTIVE BOWEL SOUNDS TO ALL QUADRANTS, FC PRESENT WITH 
CLEAR YELLOW URINE. SKIN WARM TO TOUCH AND DRY. PATIENT HAS NO S/SX OF PAIN AND DISCOMFORT. 
PATIENT RE-POSITIONED TO LEFT SIDE LYING POSITION WITH HEAD OF BED ELEVATED. IV SITE AT LEFT 
HAND GAUGE 18 WITH NO S/SX OF INFILTRATION. PATIENT REMAIN ON NPO. TELE MONITOR SHOWS SINUS 
TACHYCARDIA 111. ALL CONCERNS ADDRESSED. WILL CONTINUE TO EVALUATE CARE.

## 2019-04-21 NOTE — NUR
-------------------------------------------------------------------------------

          *** Note undone in ED - 04/21/19 at 0232 by MADDISON ***           

-------------------------------------------------------------------------------

Patient is resting comfortably in bed with eyes closed. VSS

## 2019-04-21 NOTE — NUR
RN CLOSING NOTES:

PT REMAINS NON VERBAL, NOT OPENING EYES, NOTRESPONSIVE, AS PT'S BASELINE, SON AT BED SIDE, 
PT REMAIN ON 4L OXYGEN VIA NC RESPIRATION EVEN AND UNLABORED, PT APPEARS CALM AND 
COMFORTABLE, NO FACIAL GRIMACE NOTED, ONGOING IV ATB, BLE OFFLOADED, AWAITING DELIVERY OF 
[facility] MATTRESS. VS REMAINS STABLE, AFEBRILE, NEEDS ATTENDED.  SAFETY PRECAUTIONS FOR FALL 
REMAINS ENGAGED, CALL LIGHT IN REACH, WILL ENDORSE TO DAY RN FOR CONTINUITY OF CARE.

## 2019-04-21 NOTE — NUR
M/S RN NOTES



FAMILY ON BEDSIDE REQUESTING FOR VENOUS DOPPLER ON RIGHT LEG DUE TO VEINS SEEN PROTRUDING, 
ASSESSED NO S/SX OF TENDERNESS, SKIN COLOR AND TEMPERATURE SAME AS OTHER EXTREMITIES. ALSO, 
PATIENT HAS NO BM X 3 DAYS AND FAMILY REQUESTED FOR FLEET ENEMA. OBTAINED ORDER FROM DR. KEMP FOR VENOUS DOPPLER BLE AND FLEET ENEMA PRN. ORDER VERIFIED, NOTED AND CARRIED OUT. 
FAMILY NOTIFIED.

## 2019-04-21 NOTE — NUR
ADMISSION NOTES:

RECEIVED REPORT FROM JASIEL BAUMANN. PT BROUGHT TO THE UNIT VIA GURNEY, PT IS A/O X1 PER REPORT, 
NOT RESPONSIVE, MET WITH SON AT BEDSIDE, PT HAS TRIANA CATHETER IN PLACED DRAINING INTO 
YELLOW COLORED URINE. ORIENTED TO UNIT POLICY AND HOURLY ROUNDING, SKIN ASSESSMENT 
PERFORMED, INVENTORY OF BELONGING COMPLETED BY CNA, VS TAKEN AND RECORDED, TELEMONITORING IN 
PLACED, SINUS RHYTHM HR 94, APPEARS CALM AND COMFORTABLE. IV ACCESS PATENT AND FLUSHING 
WELL, ON HL. BLE OFFLOADED. SAFETY PRECAUTIONS FOR FALL INITIATED, CALL LIGHT IN REACH, WILL 
CONTINUE MONITORING PT.

## 2019-04-22 VITALS — DIASTOLIC BLOOD PRESSURE: 71 MMHG | SYSTOLIC BLOOD PRESSURE: 116 MMHG

## 2019-04-22 VITALS — SYSTOLIC BLOOD PRESSURE: 133 MMHG | DIASTOLIC BLOOD PRESSURE: 71 MMHG

## 2019-04-22 VITALS — DIASTOLIC BLOOD PRESSURE: 58 MMHG | SYSTOLIC BLOOD PRESSURE: 100 MMHG

## 2019-04-22 LAB
ALBUMIN SERPL BCP-MCNC: 2.8 G/DL (ref 3.4–5)
ALP SERPL-CCNC: 96 U/L (ref 46–116)
ALT SERPL W P-5'-P-CCNC: 18 U/L (ref 12–78)
AST SERPL W P-5'-P-CCNC: 33 U/L (ref 15–37)
BASOPHILS # BLD AUTO: 0.1 /CMM (ref 0–0.2)
BASOPHILS NFR BLD AUTO: 0.6 % (ref 0–2)
BILIRUB SERPL-MCNC: 0.6 MG/DL (ref 0.2–1)
BUN SERPL-MCNC: 14 MG/DL (ref 7–18)
CALCIUM SERPL-MCNC: 8.8 MG/DL (ref 8.5–10.1)
CHLORIDE SERPL-SCNC: 104 MMOL/L (ref 98–107)
CHOLEST SERPL-MCNC: 151 MG/DL (ref ?–200)
CO2 SERPL-SCNC: 27 MMOL/L (ref 21–32)
CREAT SERPL-MCNC: 0.4 MG/DL (ref 0.6–1.3)
EOSINOPHIL NFR BLD AUTO: 2.1 % (ref 0–6)
GLUCOSE SERPL-MCNC: 96 MG/DL (ref 74–106)
HCT VFR BLD AUTO: 37 % (ref 33–45)
HDLC SERPL-MCNC: 54 MG/DL (ref 40–60)
HGB BLD-MCNC: 12.3 G/DL (ref 11.5–14.8)
LDLC SERPL DIRECT ASSAY-MCNC: 87 MG/DL (ref 0–99)
LYMPHOCYTES NFR BLD AUTO: 1.8 /CMM (ref 0.8–4.8)
LYMPHOCYTES NFR BLD AUTO: 22.3 % (ref 20–44)
MAGNESIUM SERPL-MCNC: 1.9 MG/DL (ref 1.8–2.4)
MCHC RBC AUTO-ENTMCNC: 33 G/DL (ref 31–36)
MCV RBC AUTO: 92 FL (ref 82–100)
MONOCYTES NFR BLD AUTO: 0.5 /CMM (ref 0.1–1.3)
MONOCYTES NFR BLD AUTO: 5.6 % (ref 2–12)
NEUTROPHILS # BLD AUTO: 5.7 /CMM (ref 1.8–8.9)
NEUTROPHILS NFR BLD AUTO: 69.4 % (ref 43–81)
PHOSPHATE SERPL-MCNC: 3.7 MG/DL (ref 2.5–4.9)
PLATELET # BLD AUTO: 192 /CMM (ref 150–450)
POTASSIUM SERPL-SCNC: 3.3 MMOL/L (ref 3.5–5.1)
PROT SERPL-MCNC: 6.5 G/DL (ref 6.4–8.2)
RBC # BLD AUTO: 4.04 MIL/UL (ref 4–5.2)
SODIUM SERPL-SCNC: 140 MMOL/L (ref 136–145)
TRIGL SERPL-MCNC: 130 MG/DL (ref 30–150)
TSH SERPL DL<=0.005 MIU/L-ACNC: 3.84 UIU/ML (ref 0.36–3.74)
WBC NRBC COR # BLD AUTO: 8.2 K/UL (ref 4.3–11)

## 2019-04-22 PROCEDURE — 05H533Z INSERTION OF INFUSION DEVICE INTO RIGHT SUBCLAVIAN VEIN, PERCUTANEOUS APPROACH: ICD-10-PCS | Performed by: FAMILY MEDICINE

## 2019-04-22 RX ADMIN — Medication SCH ML: at 17:49

## 2019-04-22 RX ADMIN — FAMOTIDINE SCH MG: 20 TABLET, FILM COATED ORAL at 08:59

## 2019-04-22 RX ADMIN — PIPERACILLIN SODIUM AND TAZOBACTAM SODIUM SCH MLS/HR: .375; 3 INJECTION, POWDER, LYOPHILIZED, FOR SOLUTION INTRAVENOUS at 03:13

## 2019-04-22 RX ADMIN — SODIUM CHLORIDE SCH MLS/HR: 9 INJECTION, SOLUTION INTRAVENOUS at 09:30

## 2019-04-22 RX ADMIN — Medication SCH MG: at 08:59

## 2019-04-22 RX ADMIN — CALCIUM CARBONATE (ANTACID) CHEW TAB 500 MG SCH MG: 500 CHEW TAB at 08:59

## 2019-04-22 RX ADMIN — Medication SCH TAB: at 08:59

## 2019-04-22 RX ADMIN — FERROUS SULFATE TAB 325 MG (65 MG ELEMENTAL FE) SCH MG: 325 (65 FE) TAB at 08:00

## 2019-04-22 RX ADMIN — Medication SCH MG: at 17:50

## 2019-04-22 RX ADMIN — POTASSIUM CHLORIDE SCH MLS/HR: 200 INJECTION, SOLUTION INTRAVENOUS at 11:59

## 2019-04-22 RX ADMIN — Medication SCH MG: at 21:52

## 2019-04-22 RX ADMIN — PIPERACILLIN SODIUM AND TAZOBACTAM SODIUM SCH MLS/HR: .375; 3 INJECTION, POWDER, LYOPHILIZED, FOR SOLUTION INTRAVENOUS at 12:43

## 2019-04-22 RX ADMIN — POTASSIUM CHLORIDE SCH MLS/HR: 200 INJECTION, SOLUTION INTRAVENOUS at 10:55

## 2019-04-22 RX ADMIN — Medication SCH UNIT: at 08:59

## 2019-04-22 RX ADMIN — Medication SCH ML: at 09:30

## 2019-04-22 RX ADMIN — Medication SCH MG: at 13:15

## 2019-04-22 RX ADMIN — Medication SCH EACH: at 08:59

## 2019-04-22 RX ADMIN — VITAMIN D, TAB 1000IU (100/BT) SCH UNIT: 25 TAB at 08:59

## 2019-04-22 RX ADMIN — SODIUM CHLORIDE SCH MLS/HR: 9 INJECTION, SOLUTION INTRAVENOUS at 21:52

## 2019-04-22 RX ADMIN — ENOXAPARIN SODIUM SCH MG: 40 INJECTION SUBCUTANEOUS at 05:19

## 2019-04-22 RX ADMIN — Medication SCH ML: at 18:08

## 2019-04-22 NOTE — NUR
MS/RN NOTES



SPOKE TO DEE FROM PHARMACY. AWARE THAT PT NOW HAS MIDLINE. OKAY TO SCAN MERREM LATE AND 
RESUME AND SCHEDULED TIME IN THE MORNING, NO NEED TO CHANGE THE ADMINISTRATION TIME.

## 2019-04-22 NOTE — NUR
M/S RN NOTES



PO MEDICATION HELD DUE TO NPO STATUS. WAITING FOR SWALLOW EVAL BEFORE GIVING ANY PO. WILL 
CONTINUE TO MONITOR CARE.

## 2019-04-22 NOTE — NUR
RN MS CLOSING NOTES 

PT REMAINS IN BED SLEEPING, AROUSES EASILY TO TOUCH. CAREGIVER AT BEDSIDE, BREATHING EVEN 
AND UNLABORED ON 4L OF 02 VIA NC AND TOLERATING. NO COUGH OR CONGESTION NOTED AT THE MOMENT. 
IV ACCESS ON THE L HAND 18G SL PATENT AND FLUSHING. F.C IN PLACE, REMAINS NPO. BED IN LOWEST 
LOCKED POSITION, CALL LIGHT WITHIN REACH AT ALL TIMES, WILL ENDORSE TO DAY NURSE FOR STACEY

## 2019-04-22 NOTE — NUR
M/S RN CLOSING NOTES



PATIENT ON BED, AWAKE WITH HEAD OF BED ELEVATED. RESPIRATION EVEN AND NON LABORED WITH NO 
ACUTE RESPIRATORY DISTRESS, OXYGEN FROM 4LPM TAPERED TO 2 LPM VIA NASAL CANNULA AND PATIENT 
SATING 96-98%. PATIENT NON VERBAL AND NON RESPONSIVE AS BASELINE BEHAVIOR. CAREGIVER JUD 
ON BEDSIDE. ABDOMEN SOFT AND NON DISTENDED WITH ACTIVE BOWEL SOUNDS TO ALL QUADRANTS, F/C 
REMOVED AND PATIENT ABLE TO URINATE IN DIAPER. SKIN WARM TO TOUCH AND DRY. NO S/SX OF 
DISCOMFORT NOTED. IV ON LEFT HAND PULLED OUT AND WAITING FOR MIDLINE INSERTION. ALL CONCERNS 
ADDRESSED WITH CAREGIVER. PLACED CALL LIGHT WITHIN REACH FOR SAFETY. ENDORSED PATIENT CARE 
TO IBIS MAXWELL.

## 2019-04-22 NOTE — NUR
MS/RN OPENING NOTES



PT RECEIVED WITH CAREGIVER AT BEDSIDE. HOB ELEVATED. ON 2L O2 VIA NC, BREATHING EVEN AND 
UNLABORED. NO S/S OF SOB, IN NO ACUTE DISTRESS. NO FACIAL GRIMACING OR OTHER SIGNS OF PAIN 
NOTED. NON VERBAL, OPENS EYES SPONTANEOUSLY. NO IV ACCESS AT THIS TIME. AWAITING MIDLINE 
NURSE. WILL BE MOVING PT TO MS2, CAREGIVER MADE AWARE. BED IN LOW/LOCKED POSITION WITH CALL 
LIGHT IN REACH. BILAT. UPPER SIDE RAILS IN PLACE. WILL CONTINUE TO MONITOR

## 2019-04-22 NOTE — NUR
M/S RN OPENING NOTES



RECEIVED ENDORSEMENT FROM IBIS BORRERO. PATIENT ON BED IN SUPINE POSITION HEAD OF BED ELEVATED 
WITH OXYGEN AT 3LPM VIA NASAL CANNULA, NO NOTED SHORTNESS OF BREATH. PATIENT NON VERBAL AND 
NON RESPONSIVE. CAREGIVER JUD ON BEDSIDE. ABDOMEN SOFT AND NON DISTENDED WITH ACTIVE 
BOWEL SOUNDS TO ALL QUADRANTS, F/C PRESENT WITH DARK YELLOW COLOR. SKIN WARM TO TOUCH AND 
DRY. STILL ON NPO. NO S/SX OF DISCOMFORT NOTED. ALL CONCERNS ADDRESSED WITH CAREGIVER. 
PLACED CALL LIGHT WITHIN REACH FOR SAFETY. WILL CONTINUE TO EVALUATE CARE.

## 2019-04-22 NOTE — NUR
M/S RN NOTES



PAGED EPIC NUMBER UNDER DR KEMP WITH ARI SAMSON NP ON CALL DUE TO INABILITY TO CONNECT 
PERIPHERAL IV LINE WITH MULTIPLE ATTEMPTS HELPED WITH SANCHEZ RN. NEW ORDER OF MID LINE NOTED 
AND CARRIED OUT. READ BACK ORDER AND VERIFIED. CALLED CLAIRE (SON) FOR NEW INTERVENTION AND 
AGREED WITH TREATMENT. NOTIFIED NURSING SUPERVISOR FOR NEW ORDER.

## 2019-04-22 NOTE — NUR
M/S RN NOTES



PAGED DR. KEMP REGARDING SWALLOW EVAL DONE THIS MORNING BY ST WITH RECOMMENDATION OF 
SWALLOW VIDEO SCAN; FAMILY REQUESTED FOR ID CONSULT DUE TO UTI; AND ENT FOR EAR WAX. MD 
RESPONDED AND AGREED WITH SWALLOW VIDEO SCAN AND ID CONSULT. ENT NOT AVAILABLE IN THE 
HOSPITAL. ORDERS NOTED AND CARRIED OUT. READ BACK AND VERIFIED. FAMILY KUSUM ON BEDSIDE 
AND NOTIFIED WITH NEW ORDERS.

## 2019-04-22 NOTE — NUR
M/S RN NOTES



PATIENT SEEN AND EVALUATED BY DR. KEMP WITH ORDER IN TAPERING DOWN OXYGEN. OXYGEN FROM 
4LPM TO 3LPM SATING 98%. FC DC TODAY. WILL CONTINUE TO MONITOR CARE

## 2019-04-23 VITALS — SYSTOLIC BLOOD PRESSURE: 138 MMHG | DIASTOLIC BLOOD PRESSURE: 74 MMHG

## 2019-04-23 VITALS — DIASTOLIC BLOOD PRESSURE: 74 MMHG | SYSTOLIC BLOOD PRESSURE: 138 MMHG

## 2019-04-23 VITALS — DIASTOLIC BLOOD PRESSURE: 61 MMHG | SYSTOLIC BLOOD PRESSURE: 99 MMHG

## 2019-04-23 LAB
BUN SERPL-MCNC: 12 MG/DL (ref 7–18)
CALCIUM SERPL-MCNC: 8.8 MG/DL (ref 8.5–10.1)
CHLORIDE SERPL-SCNC: 102 MMOL/L (ref 98–107)
CO2 SERPL-SCNC: 28 MMOL/L (ref 21–32)
CREAT SERPL-MCNC: 0.3 MG/DL (ref 0.6–1.3)
GLUCOSE SERPL-MCNC: 87 MG/DL (ref 74–106)
POTASSIUM SERPL-SCNC: 3.3 MMOL/L (ref 3.5–5.1)
SODIUM SERPL-SCNC: 139 MMOL/L (ref 136–145)

## 2019-04-23 RX ADMIN — Medication SCH TAB: at 14:13

## 2019-04-23 RX ADMIN — MEROPENEM SCH MLS/HR: 1 INJECTION INTRAVENOUS at 17:12

## 2019-04-23 RX ADMIN — Medication SCH EACH: at 09:17

## 2019-04-23 RX ADMIN — Medication SCH MG: at 13:50

## 2019-04-23 RX ADMIN — Medication SCH GM: at 09:18

## 2019-04-23 RX ADMIN — Medication SCH MG: at 09:17

## 2019-04-23 RX ADMIN — ENOXAPARIN SODIUM SCH MG: 40 INJECTION SUBCUTANEOUS at 05:52

## 2019-04-23 RX ADMIN — Medication SCH MG: at 17:11

## 2019-04-23 RX ADMIN — SODIUM CHLORIDE SCH MLS/HR: 9 INJECTION, SOLUTION INTRAVENOUS at 09:18

## 2019-04-23 RX ADMIN — CALCIUM CARBONATE (ANTACID) CHEW TAB 500 MG SCH MG: 500 CHEW TAB at 09:17

## 2019-04-23 RX ADMIN — Medication SCH UNIT: at 09:17

## 2019-04-23 RX ADMIN — VITAMIN D, TAB 1000IU (100/BT) SCH UNIT: 25 TAB at 09:17

## 2019-04-23 RX ADMIN — FAMOTIDINE SCH MG: 20 TABLET, FILM COATED ORAL at 09:17

## 2019-04-23 RX ADMIN — FERROUS SULFATE TAB 325 MG (65 MG ELEMENTAL FE) SCH MG: 325 (65 FE) TAB at 13:49

## 2019-04-23 RX ADMIN — Medication SCH ML: at 17:00

## 2019-04-23 RX ADMIN — Medication SCH ML: at 09:19

## 2019-04-23 RX ADMIN — MEROPENEM SCH MLS/HR: 1 INJECTION INTRAVENOUS at 05:46

## 2019-04-23 RX ADMIN — Medication SCH MG: at 20:14

## 2019-04-23 RX ADMIN — LEVETIRACETAM SCH MG: 250 TABLET, FILM COATED ORAL at 20:14

## 2019-04-23 RX ADMIN — Medication SCH ML: at 09:18

## 2019-04-23 RX ADMIN — Medication SCH ML: at 17:22

## 2019-04-23 RX ADMIN — Medication SCH MG: at 21:26

## 2019-04-23 NOTE — NUR
WOUND CARE CONSULT   WOUND CARE RECEIVED CONSULT FOR LOW WALTER, RIGHT ELBOW AND SACRAL 
WOUND.  WOUND CARE WILL DEFER CONSULT AND TREATMENT PLANS TO PLASTIC SURGICAL TEAM WHO ARE 
CURRENTLY FOLLOWING THIS PATIENT.  PATIENT WITH WALTER AT 7, ALL PRESSURE ULCER PREVENTION 
MEASURES ARE NOTED TO BE IN PLACE AT THIS TIME.  WILL SEE PRN.

## 2019-04-23 NOTE — NUR
RN OPENING NOTES

PT RESTING IN BED. CAREGIVER AT BEDSIDE. NO APPARENT S/S OF PAIN, DISTRESS OR SOB AT THIS 
TIME. PT IS ON 2L O2 VIA NASAL CANNULA. PT HAS A RIGHT UPPER ARM IV INTACT AND PATENT. 
SAFETY PRECAUTIONS IN PLACE, BED IN LOWEST LOCKED POSITION, X2 SIDE RAILS UP AND CALL LIGHT 
WITHIN REACH. WILL CONTINUE TO MONITOR.

## 2019-04-23 NOTE — NUR
RN NOTES

INFORMED PHARMACY OF MISSING MEDICATIONS. PER PHARMACY, WILL RESTOCK. WILL CONTINUE TO 
FOLLOW UP.

## 2019-04-23 NOTE — NUR
RN NOTES

SECOND PHONE CALL. INFORMED PHARMACY OF MISSING MEDICATIONS. PER PHARMACY, WILL RESTOCK. 
WILL CONTINUE TO FOLLOW UP.

## 2019-04-23 NOTE — NUR
RECIEVED PATIENT IN BED. RAD NAME SPOKEN NOTED SHE MOVES HER LIPS NONVERBAL AND NOT OPENING 
EYES. DAUGHTERS AT THE BEDSIDE, OFFERING MOTHER FLUIDS AND TALKING TO HER. DIAPER ON DT SHE 
IS INCONTINENT.  HEELS ON A PILLOW AND FLOATING SPOKE TO THE DTRS ABOUT ASP PRECAUTIONS.

## 2019-04-23 NOTE — NUR
RN CLOSING NOTES

PT RESTING IN BED. FAMILY AT BEDSIDE. NO APPARENT S/S OF PAIN, DISTRESS OR SOB DURING SHIFT. 
PT IS NOW ON ROOM AIR SATING WELL. PT HAS A RIGHT UPPER ARM IV INTACT AND PATENT. SAFETY 
PRECAUTIONS IN PLACE, BED IN LOWEST LOCKED POSITION, X2 SIDE RAILS UP AND CALL LIGHT WITHIN 
REACH. WILL ENDORSE TO NIGHT SHIFT NURSE FOR CONTINUITY OF CARE.

## 2019-04-23 NOTE — NUR
MS/RN OPENING NOTES



PT ASLEEP, CAREGIVER AT BEDSIDE. ON 2L O2 VIA NC, BREATHING EVEN AND UNLABORED. NO S/S OF 
SOB OR PAIN. HOB ELEVATED. NON VERBAL, OPENS EYES SPONTANEOUSLY. ROSALINA MIDLINE PATENT AND 
INTACT. ASPIRATION PRECAUTIONS IMPLEMENTED. ALL DUE MEDS GIVEN. NO SIGNIFICANT CHANGES 
OVERNIGHT. TURNED/REPOSITIONED Q2H, HEELS OFFLOADED. BED REMAINS IN LOW/LOCKED POSITION WITH 
CALL LIGHT IN REACH, BILAT. SIDE RAILS UPX2 WITH BED ALARM ON FOR SAFETY. WILL ENDORSE TO 
DAY SHIFT RN STACEY.

## 2019-04-24 VITALS — SYSTOLIC BLOOD PRESSURE: 116 MMHG | DIASTOLIC BLOOD PRESSURE: 72 MMHG

## 2019-04-24 VITALS — DIASTOLIC BLOOD PRESSURE: 72 MMHG | SYSTOLIC BLOOD PRESSURE: 116 MMHG

## 2019-04-24 VITALS — SYSTOLIC BLOOD PRESSURE: 92 MMHG | DIASTOLIC BLOOD PRESSURE: 48 MMHG

## 2019-04-24 VITALS — DIASTOLIC BLOOD PRESSURE: 59 MMHG | SYSTOLIC BLOOD PRESSURE: 113 MMHG

## 2019-04-24 LAB
BASOPHILS # BLD AUTO: 0 /CMM (ref 0–0.2)
BASOPHILS NFR BLD AUTO: 0.7 % (ref 0–2)
BUN SERPL-MCNC: 7 MG/DL (ref 7–18)
CALCIUM SERPL-MCNC: 8.7 MG/DL (ref 8.5–10.1)
CHLORIDE SERPL-SCNC: 106 MMOL/L (ref 98–107)
CO2 SERPL-SCNC: 28 MMOL/L (ref 21–32)
CREAT SERPL-MCNC: 0.3 MG/DL (ref 0.6–1.3)
EOSINOPHIL NFR BLD AUTO: 2 % (ref 0–6)
GLUCOSE SERPL-MCNC: 88 MG/DL (ref 74–106)
HCT VFR BLD AUTO: 32 % (ref 33–45)
HGB BLD-MCNC: 10.8 G/DL (ref 11.5–14.8)
LYMPHOCYTES NFR BLD AUTO: 1.5 /CMM (ref 0.8–4.8)
LYMPHOCYTES NFR BLD AUTO: 33.3 % (ref 20–44)
MCHC RBC AUTO-ENTMCNC: 34 G/DL (ref 31–36)
MCV RBC AUTO: 92 FL (ref 82–100)
MONOCYTES NFR BLD AUTO: 0.3 /CMM (ref 0.1–1.3)
MONOCYTES NFR BLD AUTO: 6.2 % (ref 2–12)
NEUTROPHILS # BLD AUTO: 2.7 /CMM (ref 1.8–8.9)
NEUTROPHILS NFR BLD AUTO: 57.8 % (ref 43–81)
PLATELET # BLD AUTO: 188 /CMM (ref 150–450)
POTASSIUM SERPL-SCNC: 3.5 MMOL/L (ref 3.5–5.1)
RBC # BLD AUTO: 3.52 MIL/UL (ref 4–5.2)
SODIUM SERPL-SCNC: 141 MMOL/L (ref 136–145)
WBC NRBC COR # BLD AUTO: 4.6 K/UL (ref 4.3–11)

## 2019-04-24 RX ADMIN — LEVETIRACETAM SCH MG: 250 TABLET, FILM COATED ORAL at 08:38

## 2019-04-24 RX ADMIN — FERROUS SULFATE TAB 325 MG (65 MG ELEMENTAL FE) SCH MG: 325 (65 FE) TAB at 08:38

## 2019-04-24 RX ADMIN — LEVETIRACETAM SCH MG: 250 TABLET, FILM COATED ORAL at 20:58

## 2019-04-24 RX ADMIN — Medication SCH EACH: at 08:38

## 2019-04-24 RX ADMIN — MEROPENEM SCH MLS/HR: 1 INJECTION INTRAVENOUS at 04:39

## 2019-04-24 RX ADMIN — FAMOTIDINE SCH MG: 20 TABLET, FILM COATED ORAL at 08:38

## 2019-04-24 RX ADMIN — Medication SCH MG: at 20:59

## 2019-04-24 RX ADMIN — Medication SCH ML: at 17:09

## 2019-04-24 RX ADMIN — Medication SCH ML: at 09:09

## 2019-04-24 RX ADMIN — Medication SCH MG: at 21:07

## 2019-04-24 RX ADMIN — Medication SCH MG: at 13:29

## 2019-04-24 RX ADMIN — Medication SCH UNIT: at 08:38

## 2019-04-24 RX ADMIN — Medication SCH MG: at 08:38

## 2019-04-24 RX ADMIN — ENOXAPARIN SODIUM SCH MG: 40 INJECTION SUBCUTANEOUS at 06:20

## 2019-04-24 RX ADMIN — VITAMIN D, TAB 1000IU (100/BT) SCH UNIT: 25 TAB at 08:38

## 2019-04-24 RX ADMIN — CALCIUM CARBONATE (ANTACID) CHEW TAB 500 MG SCH MG: 500 CHEW TAB at 08:38

## 2019-04-24 RX ADMIN — CEFEPIME HYDROCHLORIDE SCH MLS/HR: 1 INJECTION, POWDER, FOR SOLUTION INTRAMUSCULAR; INTRAVENOUS at 17:09

## 2019-04-24 RX ADMIN — SODIUM CHLORIDE PRN MLS/HR: 9 INJECTION, SOLUTION INTRAVENOUS at 10:50

## 2019-04-24 RX ADMIN — Medication SCH GM: at 09:10

## 2019-04-24 RX ADMIN — Medication SCH MG: at 17:09

## 2019-04-24 RX ADMIN — Medication SCH TAB: at 08:38

## 2019-04-24 NOTE — NUR
SLEPT WELL THIS 12 HOURS.  REMAINS NONVERBAL. PRIVATE SITTER REMAINS AT THE BEDSIDE, ASSISTS 
WITH CARE.  ASPIRATION PRECAUTION MAINTAINED THIS 12 HOURS.  NEVER OPENING HER EYES FOR ME.  
AFEBRILE THIS 12 HOURS. SAT ABOVE 92% THIS 12 HOURS.

## 2019-04-24 NOTE — NUR
PATIENT IN BED, SITTER AT BEDSIDE. PATIENT ON ROOM AIR TOLERATING WELL, IV FLUIDS RUNNING AT 
75 ML/HR. SAFETY AND ASPIRATION PRECAUTIONS IN PLACE. ALL NEEDS ATTENDED, PATIENT TURNED AND 
REPOSITIONED Q2H. WILL ENDORSE TO NEXT SHIFT FOR STACEY.

## 2019-04-25 VITALS — DIASTOLIC BLOOD PRESSURE: 69 MMHG | SYSTOLIC BLOOD PRESSURE: 132 MMHG

## 2019-04-25 VITALS — DIASTOLIC BLOOD PRESSURE: 78 MMHG | SYSTOLIC BLOOD PRESSURE: 133 MMHG

## 2019-04-25 VITALS — SYSTOLIC BLOOD PRESSURE: 133 MMHG | DIASTOLIC BLOOD PRESSURE: 78 MMHG

## 2019-04-25 VITALS — SYSTOLIC BLOOD PRESSURE: 132 MMHG | DIASTOLIC BLOOD PRESSURE: 69 MMHG

## 2019-04-25 VITALS — DIASTOLIC BLOOD PRESSURE: 64 MMHG | SYSTOLIC BLOOD PRESSURE: 132 MMHG

## 2019-04-25 LAB
BASOPHILS # BLD AUTO: 0 /CMM (ref 0–0.2)
BASOPHILS NFR BLD AUTO: 0.7 % (ref 0–2)
BUN SERPL-MCNC: 6 MG/DL (ref 7–18)
CALCIUM SERPL-MCNC: 8.4 MG/DL (ref 8.5–10.1)
CHLORIDE SERPL-SCNC: 107 MMOL/L (ref 98–107)
CO2 SERPL-SCNC: 27 MMOL/L (ref 21–32)
CREAT SERPL-MCNC: 0.2 MG/DL (ref 0.6–1.3)
EOSINOPHIL NFR BLD AUTO: 2.4 % (ref 0–6)
GLUCOSE SERPL-MCNC: 86 MG/DL (ref 74–106)
HCT VFR BLD AUTO: 33 % (ref 33–45)
HGB BLD-MCNC: 11 G/DL (ref 11.5–14.8)
LYMPHOCYTES NFR BLD AUTO: 1.8 /CMM (ref 0.8–4.8)
LYMPHOCYTES NFR BLD AUTO: 37.8 % (ref 20–44)
MCHC RBC AUTO-ENTMCNC: 34 G/DL (ref 31–36)
MCV RBC AUTO: 92 FL (ref 82–100)
MONOCYTES NFR BLD AUTO: 0.3 /CMM (ref 0.1–1.3)
MONOCYTES NFR BLD AUTO: 7.3 % (ref 2–12)
NEUTROPHILS # BLD AUTO: 2.4 /CMM (ref 1.8–8.9)
NEUTROPHILS NFR BLD AUTO: 51.8 % (ref 43–81)
PLATELET # BLD AUTO: 191 /CMM (ref 150–450)
POTASSIUM SERPL-SCNC: 3.5 MMOL/L (ref 3.5–5.1)
RBC # BLD AUTO: 3.58 MIL/UL (ref 4–5.2)
SODIUM SERPL-SCNC: 141 MMOL/L (ref 136–145)
WBC NRBC COR # BLD AUTO: 4.7 K/UL (ref 4.3–11)

## 2019-04-25 RX ADMIN — Medication SCH ML: at 08:55

## 2019-04-25 RX ADMIN — ENOXAPARIN SODIUM SCH MG: 40 INJECTION SUBCUTANEOUS at 05:27

## 2019-04-25 RX ADMIN — Medication SCH UNIT: at 08:02

## 2019-04-25 RX ADMIN — CEFEPIME HYDROCHLORIDE SCH MLS/HR: 1 INJECTION, POWDER, FOR SOLUTION INTRAMUSCULAR; INTRAVENOUS at 05:19

## 2019-04-25 RX ADMIN — Medication SCH MG: at 23:34

## 2019-04-25 RX ADMIN — LEVETIRACETAM SCH MG: 250 TABLET, FILM COATED ORAL at 08:02

## 2019-04-25 RX ADMIN — SODIUM CHLORIDE PRN MLS/HR: 9 INJECTION, SOLUTION INTRAVENOUS at 16:20

## 2019-04-25 RX ADMIN — Medication SCH EACH: at 08:02

## 2019-04-25 RX ADMIN — LEVETIRACETAM SCH MG: 250 TABLET, FILM COATED ORAL at 23:33

## 2019-04-25 RX ADMIN — VITAMIN D, TAB 1000IU (100/BT) SCH UNIT: 25 TAB at 08:02

## 2019-04-25 RX ADMIN — Medication SCH ML: at 17:00

## 2019-04-25 RX ADMIN — Medication SCH MG: at 12:25

## 2019-04-25 RX ADMIN — Medication SCH MG: at 08:02

## 2019-04-25 RX ADMIN — Medication SCH ML: at 16:19

## 2019-04-25 RX ADMIN — Medication SCH GM: at 09:00

## 2019-04-25 RX ADMIN — Medication SCH TAB: at 08:02

## 2019-04-25 RX ADMIN — Medication SCH MG: at 17:58

## 2019-04-25 RX ADMIN — SODIUM CHLORIDE PRN MLS/HR: 9 INJECTION, SOLUTION INTRAVENOUS at 00:38

## 2019-04-25 RX ADMIN — Medication SCH ML: at 08:57

## 2019-04-25 RX ADMIN — FAMOTIDINE SCH MG: 20 TABLET, FILM COATED ORAL at 08:02

## 2019-04-25 RX ADMIN — FERROUS SULFATE TAB 325 MG (65 MG ELEMENTAL FE) SCH MG: 325 (65 FE) TAB at 08:02

## 2019-04-25 RX ADMIN — CALCIUM CARBONATE (ANTACID) CHEW TAB 500 MG SCH MG: 500 CHEW TAB at 08:02

## 2019-04-25 NOTE — NUR
MS RN



ASLEEP AND EASILY AWAKEN,  NON VERBAL OPENS EYES TO NAME, TOUCH, LIGHT PAIN. RESPIRATION 
EVEN AND UNLABORED, STABLE AND NOT IN DISTRESS, AM CARE RENDERED,  CAREGIVER AT BEDSIDE. 
AFEBRILE. NEEDS ATTENDED AND ANTICIPATED,  KEPT CLEAN AND DRY AND COMFORTABLE. NURSING CARE 
RENDERED, GOOD SKIN CARE PROVIDED. REPOSITIONED PT EVERY 2 HOURS AND PRN. SAFETY MEASURES AT 
ALL TIMES. ENDORSE TO THE NEXT SHIFT.

## 2019-04-25 NOTE — NUR
MS/RN   OPENING NOTE



THE PATIENT IS RECEIVED IN BED. OPENS EYES TO VERBAL AND TACTILE STIMULI BUT THE PATIENT IS 
NON-VERBAL. IN ROOM AIR. NO MANIFESTATION OF SOB OR DISTRESS. CAREGIVER AT THE BEDSIDE. 
RIGHT UPPER MIDLINE PATENT AND NORMAL SALINE INFUSING AT 75ML/HR AND NO S/S INFILTRATION 
NOTED. BED LOW AND LOCKED. SIDE RAILS UP X3. CALL LIGHT WITHIN REACH. WILL CONTINUE TO 
MONITOR.

## 2019-04-25 NOTE — NUR
MS/RN   NOTE



THE PATIENT IN BED AND SLEEPING. OPENS EYES TO VERBAL STIMULI. IN ROOM AIR AND SATURATION IS 
AT 96%. DENIES SOB. RESPIRATION REGULAR AND UNLABORED. DENIES PAIN. THE PATIENT IN NO 
APPARENT DISTRESS. RIGHT UPPER MIDLINE PATENT AND NORMAL SALINE INFUSING AT 75ML/HR AND NO 
S/S INFILTRATION NOTED. BED LOW AND LOCKED. SIDE RAILS UP X3. CALL LIGHT WITHIN REACH. WILL 
ENDORSE TO NIGHT SHIFT.

## 2019-04-25 NOTE — NUR
RN OPENING NOTES



RECEIVED REPORT FROM DAYSHIFT RN SHIREEN. FOUND Pt ASLEEP IN BED, PERSONAL CAREGIVER AT BEDSIDE. 
Pt IS A/OX1, NONVERBAL, OPENS EYES, CAN SWALLOW PUREED FOODS AND CAN TAKE MEDS CRUSHED IN 
PUDDING. FEEDER NEEDED. POTENTIAL ASPIRATION PRECAUTION. NO S/S OF ACUTE DISTRESS OR SOB 
NOTED. RESPIRATIONS ARE EVEN AND UNLABORED. IV ACCESS ON ROSALINA MIDLINE, IVF NS @75ML/HR. 
SAFETY MEASURES IN PLACE. BED LOW, LOCKED, HOB ELEVATED, SIDE RAILS UP, BED ALARM ON. WILL 
CONTINUE TO MONITOR Pt's CONDITION AND SAFETY THROUGHOUT THE NIGHT.

## 2019-04-26 VITALS — SYSTOLIC BLOOD PRESSURE: 167 MMHG | DIASTOLIC BLOOD PRESSURE: 98 MMHG

## 2019-04-26 VITALS — DIASTOLIC BLOOD PRESSURE: 70 MMHG | SYSTOLIC BLOOD PRESSURE: 125 MMHG

## 2019-04-26 VITALS — SYSTOLIC BLOOD PRESSURE: 121 MMHG | DIASTOLIC BLOOD PRESSURE: 68 MMHG

## 2019-04-26 RX ADMIN — VITAMIN D, TAB 1000IU (100/BT) SCH UNIT: 25 TAB at 08:54

## 2019-04-26 RX ADMIN — Medication SCH ML: at 08:54

## 2019-04-26 RX ADMIN — LEVETIRACETAM SCH MG: 250 TABLET, FILM COATED ORAL at 08:55

## 2019-04-26 RX ADMIN — Medication SCH ML: at 17:15

## 2019-04-26 RX ADMIN — Medication SCH MG: at 21:41

## 2019-04-26 RX ADMIN — ENOXAPARIN SODIUM SCH MG: 40 INJECTION SUBCUTANEOUS at 05:55

## 2019-04-26 RX ADMIN — Medication SCH ML: at 17:14

## 2019-04-26 RX ADMIN — SIMETHICONE SCH MG: 20 SUSPENSION/ DROPS ORAL at 21:29

## 2019-04-26 RX ADMIN — FERROUS SULFATE TAB 325 MG (65 MG ELEMENTAL FE) SCH MG: 325 (65 FE) TAB at 08:55

## 2019-04-26 RX ADMIN — Medication SCH ML: at 08:56

## 2019-04-26 RX ADMIN — Medication SCH UNIT: at 08:55

## 2019-04-26 RX ADMIN — Medication SCH GM: at 08:54

## 2019-04-26 RX ADMIN — Medication SCH MG: at 08:54

## 2019-04-26 RX ADMIN — LEVETIRACETAM SCH MG: 100 SOLUTION ORAL at 21:30

## 2019-04-26 RX ADMIN — Medication SCH TAB: at 08:55

## 2019-04-26 RX ADMIN — FAMOTIDINE SCH MG: 20 TABLET, FILM COATED ORAL at 08:55

## 2019-04-26 RX ADMIN — Medication SCH MG: at 12:22

## 2019-04-26 RX ADMIN — LEVETIRACETAM SCH MG: 100 SOLUTION ORAL at 09:38

## 2019-04-26 RX ADMIN — SODIUM CHLORIDE PRN MLS/HR: 9 INJECTION, SOLUTION INTRAVENOUS at 06:00

## 2019-04-26 RX ADMIN — SIMETHICONE SCH MG: 20 SUSPENSION/ DROPS ORAL at 17:14

## 2019-04-26 RX ADMIN — Medication SCH EACH: at 08:55

## 2019-04-26 RX ADMIN — CALCIUM CARBONATE (ANTACID) CHEW TAB 500 MG SCH MG: 500 CHEW TAB at 08:55

## 2019-04-26 NOTE — NUR
MS/RN

PATIENT IS SLEEPING AT THIS TIME, APPEAR COMFORTABLE, NO SIGNS OF DISTRESS NOTED,  CAREGIVER 
AT BEDSIDE. WILL CONTINUE TO MONITOR.

## 2019-04-26 NOTE — NUR
RN CLOSING NOTES



NO SIGNIFICANT CHANGES IN Pt's CONDITION. Pt REMAINS STABLE AT THIS TIME. NO S/S OF ACUTE 
DISTRESS OR SOB NOTED DURING THE NIGHT. ALL NEEDS MET AND ATTENDED TO. ALL ORDERED MEDS 
GIVEN. Pt IS RESTING COMFORTABLY IN BED. RESPIRATIONS EVEN AND UNLABORED. SAFETY MEASURES IN 
PLACE. WILL ENDORSE TO DAYSHIFT RN FOR Pt's STACEY.

## 2019-04-26 NOTE — NUR
MS/RN CLOSING NOTE



PATIENT IN BED IN STABLE CONDITION. A/O X 1. NON VERBAL, OPEN EYES. NO SIGNS OF ACUTE 
DISTRESS. NO COMPLAIN OF PAIN OR DISCOMFORT. ALL NEEDS ATTENDED TO. CALL LIGHT WITHIN REACH. 
WILL ENDORSE TO NEXT SHIFT FOR CONTINUITY OF CARE.

## 2019-04-26 NOTE — NUR
MS/RN



SEEN BY DR SIERRA WITH ORDERS TO DC IV FLUIDS SINCE BP NOTED IN WNL RANGE. ORDERS NOTED 
AND CARRIED OUT. PATIENT AND FAMILY NOTIFIED.

## 2019-04-26 NOTE — NUR
MS/RN OPENING NOTE



PATIENT IN BED IN STABLE CONDITION. A/O X 1, OPEN EYES. NO SIGNS OF ACUTE DISTRESS. NO 
COMPLAIN OF PAIN OR DISCOMFORT. CAREGIVER AT BEDSIDE. ALL NEEDS ATTENDED TO. CALL LIGHT 
WITHIN REACH. WILL CONTINUE TO MONITOR TO ENSURE SAFETY.

## 2019-04-27 VITALS — SYSTOLIC BLOOD PRESSURE: 115 MMHG | DIASTOLIC BLOOD PRESSURE: 75 MMHG

## 2019-04-27 VITALS — DIASTOLIC BLOOD PRESSURE: 73 MMHG | SYSTOLIC BLOOD PRESSURE: 130 MMHG

## 2019-04-27 VITALS — SYSTOLIC BLOOD PRESSURE: 128 MMHG | DIASTOLIC BLOOD PRESSURE: 76 MMHG

## 2019-04-27 RX ADMIN — FERROUS SULFATE TAB 325 MG (65 MG ELEMENTAL FE) SCH MG: 325 (65 FE) TAB at 08:03

## 2019-04-27 RX ADMIN — FAMOTIDINE SCH MG: 20 TABLET, FILM COATED ORAL at 08:03

## 2019-04-27 RX ADMIN — Medication SCH GM: at 08:05

## 2019-04-27 RX ADMIN — LEVETIRACETAM SCH MG: 100 SOLUTION ORAL at 08:03

## 2019-04-27 RX ADMIN — SIMETHICONE SCH MG: 20 SUSPENSION/ DROPS ORAL at 16:56

## 2019-04-27 RX ADMIN — Medication SCH ML: at 16:56

## 2019-04-27 RX ADMIN — Medication SCH UNIT: at 08:03

## 2019-04-27 RX ADMIN — LEVETIRACETAM SCH MG: 100 SOLUTION ORAL at 20:31

## 2019-04-27 RX ADMIN — SIMETHICONE SCH MG: 20 SUSPENSION/ DROPS ORAL at 12:05

## 2019-04-27 RX ADMIN — CALCIUM CARBONATE (ANTACID) CHEW TAB 500 MG SCH MG: 500 CHEW TAB at 08:03

## 2019-04-27 RX ADMIN — Medication SCH ML: at 08:05

## 2019-04-27 RX ADMIN — Medication SCH TAB: at 08:03

## 2019-04-27 RX ADMIN — SIMETHICONE SCH MG: 20 SUSPENSION/ DROPS ORAL at 08:03

## 2019-04-27 RX ADMIN — SIMETHICONE SCH MG: 20 SUSPENSION/ DROPS ORAL at 20:26

## 2019-04-27 RX ADMIN — Medication SCH MG: at 20:33

## 2019-04-27 RX ADMIN — VITAMIN D, TAB 1000IU (100/BT) SCH UNIT: 25 TAB at 08:04

## 2019-04-27 RX ADMIN — Medication SCH ML: at 08:03

## 2019-04-27 RX ADMIN — ENOXAPARIN SODIUM SCH MG: 40 INJECTION SUBCUTANEOUS at 06:51

## 2019-04-27 RX ADMIN — Medication SCH EACH: at 08:03

## 2019-04-27 RX ADMIN — Medication SCH ML: at 17:03

## 2019-04-27 NOTE — NUR
MS/RN

PATIENT IS STILL SLEEPING AT THIS TIME, APPEAR COMFORTABLE, NO DISTRESS NOTED, CAREGIVER AT 
BEDSIDE, ALL NEEDS ATTENDED AT THIS TIME, WILL CONTINUE TO MONITOR.

## 2019-04-27 NOTE — NUR
BOWEL MOVEMENT

Bed bath given, voided urine, had BM this shift, small amount, no diarrhea. Total assist, 
repositioned, offload extremities.

## 2019-04-27 NOTE — NUR
MS/RN OPENING NOTE



PATIENT IN BED IN STABLE CONDITION. A/O X 1, NON VERBAL, OPEN EYES UPON REACH. NO SIGNS OF 
ACUTE DISTRESS. NO COMPLAIN OF PAIN OR DISCOMFORT. CAREGIVER AT BEDSIDE. ALL NEEDS ATTENDED 
TO. CALL LIGHT WITHIN REACH. WILL CONTINUE TO MONITOR TO ENSURE SAFETY.

## 2019-04-27 NOTE — NUR
MS/RN CLOSING NOTE



PATIENT IN BED IN STABLE CONDITION, NON VERBAL, OPEN EYES. NO SIGN OF ACUTE DISTRESS. NO 
COMPLAIN OF PAIN OR DISCOMFORT. CAREGIVER AT BEDSIDE. ALL NEEDS ATTENDED TO. CALL LIGHT 
WITHIN REACH. WILL ENDORSE TO NEXT SHIFT FOR CONTINUITY OF CARE.

## 2019-04-27 NOTE — NUR
CHANGE OF SHIFT REPORT

Received patient in bed upright position, daughter Corrie feeding the patient. Patient 
appears comfortable, able to open eyes, no facial grimace. Stable oxygen saturation on RA. 
Will cont to monitor.

## 2019-04-27 NOTE — NUR
MEDICATION NON ADMINISTRATION

Patient daughter Corrie refused Senokot, per daughter patient had bowel movement today. 
Medication risk and benefits explained to Corrie, daughter, verbalized understanding.

## 2019-04-28 VITALS — DIASTOLIC BLOOD PRESSURE: 63 MMHG | SYSTOLIC BLOOD PRESSURE: 117 MMHG

## 2019-04-28 VITALS — SYSTOLIC BLOOD PRESSURE: 117 MMHG | DIASTOLIC BLOOD PRESSURE: 65 MMHG

## 2019-04-28 VITALS — SYSTOLIC BLOOD PRESSURE: 117 MMHG | DIASTOLIC BLOOD PRESSURE: 63 MMHG

## 2019-04-28 VITALS — DIASTOLIC BLOOD PRESSURE: 78 MMHG | SYSTOLIC BLOOD PRESSURE: 139 MMHG

## 2019-04-28 RX ADMIN — FERROUS SULFATE TAB 325 MG (65 MG ELEMENTAL FE) SCH MG: 325 (65 FE) TAB at 08:54

## 2019-04-28 RX ADMIN — Medication SCH ML: at 08:56

## 2019-04-28 RX ADMIN — LEVETIRACETAM SCH MG: 100 SOLUTION ORAL at 21:06

## 2019-04-28 RX ADMIN — SIMETHICONE SCH MG: 20 SUSPENSION/ DROPS ORAL at 21:07

## 2019-04-28 RX ADMIN — Medication SCH EACH: at 08:54

## 2019-04-28 RX ADMIN — ENOXAPARIN SODIUM SCH MG: 40 INJECTION SUBCUTANEOUS at 05:48

## 2019-04-28 RX ADMIN — LEVETIRACETAM SCH MG: 100 SOLUTION ORAL at 08:54

## 2019-04-28 RX ADMIN — Medication SCH ML: at 16:08

## 2019-04-28 RX ADMIN — Medication SCH GM: at 08:57

## 2019-04-28 RX ADMIN — SIMETHICONE SCH MG: 20 SUSPENSION/ DROPS ORAL at 16:08

## 2019-04-28 RX ADMIN — ALUMINUM HYDROXIDE, MAGNESIUM HYDROXIDE, AND SIMETHICONE PRN ML: 200; 200; 20 SUSPENSION ORAL at 19:52

## 2019-04-28 RX ADMIN — Medication SCH TAB: at 08:54

## 2019-04-28 RX ADMIN — SIMETHICONE SCH MG: 20 SUSPENSION/ DROPS ORAL at 08:56

## 2019-04-28 RX ADMIN — Medication SCH UNIT: at 08:54

## 2019-04-28 RX ADMIN — FAMOTIDINE SCH MG: 20 TABLET, FILM COATED ORAL at 08:54

## 2019-04-28 RX ADMIN — VITAMIN D, TAB 1000IU (100/BT) SCH UNIT: 25 TAB at 08:54

## 2019-04-28 RX ADMIN — CALCIUM CARBONATE (ANTACID) CHEW TAB 500 MG SCH MG: 500 CHEW TAB at 08:54

## 2019-04-28 RX ADMIN — Medication SCH MG: at 22:00

## 2019-04-28 RX ADMIN — SIMETHICONE SCH MG: 20 SUSPENSION/ DROPS ORAL at 12:23

## 2019-04-28 NOTE — NUR
RECEIVED PATIENT IN BED WITH EYES OPEN, NON-VERBAL. NO ACUTE DISTRESS NOTED. NO SIGNS OF 
PAIN NOTED. IV SITE PATENT, INTACT; FLUSHED. SAFETY REMINDERS GIVEN. ON LOW BED WITH 
BILATERAL UPPER SIDE RAILS UP. CALL BELL WITHIN EASY REACH. WILL CONTINUE TO MONITOR. 
DAUGHTER AT BEDSIDE.

## 2019-04-28 NOTE — NUR
END OF SHIFT REPORT

Patient in bed, stable oxygen saturation on RA. No acute events overnight. ROSALINA midline 
intact. Off antibiotic, blood culture pending result. Afebrile, repositioned, offload 
extremities. Aspiration, fall, seizure, skin precaution maintained. Right elbow wound 
dressing C/D/I. Pending discharge to SNF,  for placement. Maintained safety.

## 2019-04-28 NOTE — NUR
MS RN OPENING NOTE



RECEIVED PATIENT IN BED. ALERT, NON-VERBAL. ON ROOM AIR, TOLERATING WELL. IN NO APPARENT 
DISTRESS OR DISCOMFORT AT THIS TIME. RESPIRATIONS EVEN AND UNLABORED. UNABLE TO COMMUNICATE 
NEEDS, CAREGIVER AT BEDSIDE. RIGHT UPPER ARM MIDLINE, SL, PATENT AND INTACT. DRESSING ON 
RIGHT ELBOW CLEAN/DRY/INTACT. PATIENT KEPT CLEAN AND COMFORTABLE. ALL NEEDS ATTENDED, SAFETY 
MEASURES IN PLACE, BED IN LOW LOCKED POSITION, SIDE RAILS UP X2, CALL LIGHT WITHIN EASY 
REACH. WILL CONTINUE TO MONITOR.

## 2019-04-28 NOTE — NUR
PATIENT HAD A SOFT BUT NOT WATERY STOOL DURING THE DAY SHIFT AS PER DAY SHIFT NURSE. 
DAUGHTER REQUESTED TO HOLD ANY LAXATIVES OR STOOL SOFTENER. SENNOSIDES HELD.

## 2019-04-28 NOTE — NUR
PATIENT WAS REPOSITIONED TO HER RIGHT SIDE AT 1400. AT 1450, PATIENT'S DAUGHTER REQUESTED TO 
PLACE HER ON HER BACK AGAIN. RISKS AND BENEFITS EXPLAINED. FAMILIES WISHES IMPLEMENTED.

## 2019-04-28 NOTE — NUR
MS RN CLOSING NOTE



PATIENT IN BED. SLEEPING AROUSED WITH PHYSICAL STIMULI, WITH BRIEF EYE OPENING ONLY, 
NON-VERBAL. ON ROOM AIR, TOLERATING WELL. IN NO APPARENT DISTRESS OR DISCOMFORT AT THIS 
TIME. RESPIRATIONS EVEN AND UNLABORED. UNABLE TO COMMUNICATE NEEDS, DAUGHTER AT BEDSIDE. 
RIGHT UPPER ARM MIDLINE, SL, PATENT AND INTACT. DRESSING ON RIGHT ELBOW CLEAN/DRY/INTACT. 
PATIENT KEPT CLEAN AND COMFORTABLE. TURNED AND REPOSITIONED PER PROTOCOL AND PER FAMILY'S 
WISHES. ALL NEEDS ATTENDED, ORDERS RENDERED. SAFETY MEASURES IN PLACE, BED IN LOW LOCKED 
POSITION, SIDE RAILS UP X2, CALL LIGHT WITHIN EASY REACH. WILL ENDORSE TO PM NURSE FOR STACEY.

## 2019-04-29 VITALS — DIASTOLIC BLOOD PRESSURE: 59 MMHG | SYSTOLIC BLOOD PRESSURE: 105 MMHG

## 2019-04-29 VITALS — SYSTOLIC BLOOD PRESSURE: 108 MMHG | DIASTOLIC BLOOD PRESSURE: 63 MMHG

## 2019-04-29 RX ADMIN — VITAMIN D, TAB 1000IU (100/BT) SCH UNIT: 25 TAB at 08:29

## 2019-04-29 RX ADMIN — Medication SCH TAB: at 08:29

## 2019-04-29 RX ADMIN — Medication SCH UNIT: at 08:29

## 2019-04-29 RX ADMIN — FERROUS SULFATE TAB 325 MG (65 MG ELEMENTAL FE) SCH MG: 325 (65 FE) TAB at 08:29

## 2019-04-29 RX ADMIN — CALCIUM CARBONATE (ANTACID) CHEW TAB 500 MG SCH MG: 500 CHEW TAB at 08:28

## 2019-04-29 RX ADMIN — ENOXAPARIN SODIUM SCH MG: 40 INJECTION SUBCUTANEOUS at 05:38

## 2019-04-29 RX ADMIN — Medication SCH ML: at 08:29

## 2019-04-29 RX ADMIN — ALUMINUM HYDROXIDE, MAGNESIUM HYDROXIDE, AND SIMETHICONE PRN ML: 200; 200; 20 SUSPENSION ORAL at 11:32

## 2019-04-29 RX ADMIN — FAMOTIDINE SCH MG: 20 TABLET, FILM COATED ORAL at 08:29

## 2019-04-29 RX ADMIN — SIMETHICONE SCH MG: 20 SUSPENSION/ DROPS ORAL at 12:01

## 2019-04-29 RX ADMIN — Medication SCH GM: at 09:20

## 2019-04-29 RX ADMIN — Medication SCH ML: at 09:20

## 2019-04-29 RX ADMIN — Medication SCH EACH: at 08:29

## 2019-04-29 RX ADMIN — SIMETHICONE SCH MG: 20 SUSPENSION/ DROPS ORAL at 08:28

## 2019-04-29 RX ADMIN — LEVETIRACETAM SCH MG: 100 SOLUTION ORAL at 08:29

## 2019-04-29 NOTE — NUR
DISCHARGE NOTE



PT WAS D/C AT THIS TIME IN MEDICALLY STABLE CONDITION BACK TO [facility] AT THIS 
TIME WHERE REPORT WAS GIVEN TO VANITA. IV AND ID BAND WERE REMOVED. ALL D/C PAPERWORK, 
EXITCARE, AND BELONGINGS LIST WERE DISCUSSED, SIGNED, AND HANDED TO THE PATIENT CAREGIVER 
AND EMT CREW. SKIN WOUND PHOTOS WERE TAKEN AND PLACED IN THE CHART. ALL NEEDS WERE ATTENDED 
TO DURING HER STAY. PT LEFT WITH EMT CREW AT THIS TIME.

## 2019-04-29 NOTE — NUR
PATIENT ASLEEP, EASILY AROUSABLE. RESPIRATIONS EVEN. NO SIGNS OF PAIN OR NAUSEA NOTED. DUE 
MEDS GIVEN WITH NO ASE NOTED. NEEDS ATTENDED. KEPT CLEAN, DRY, AND COMFORTABLE. SAFETY 
PRECAUTIONS AND COMFORT MEASURES IN PLACE. WILL GIVE REPORT TO DAY SHIFT FOR CONTINUITY OF 
CARE. CAREGIVER AT BEDSIDE.

## 2019-04-29 NOTE — NUR
RN OPENING NOTE



PT WAS RECEIVED IN BED AT LOWEST AND LOCKED POSITION WITH SIDE RAILS UP X2, PT IS NON-VERBAL 
BUT OPENS EYES, BREATHING EVEN AND UNLABORED ON RA, NO S/S OF ANY DISTRESS OR PAIN AT THIS 
TIME, IV IS PATENT AND INTACT, CAREGIVER PRESENT AT BEDSIDE, POSSIBLE D/C TODAY, SAFETY 
PRECAUTIONS IN PLACE, CALL LIGHT WITHIN REACH, WILL MONITOR ACCORDINGLY

## 2019-06-13 ENCOUNTER — HOSPITAL ENCOUNTER (INPATIENT)
Dept: HOSPITAL 54 - ER | Age: 79
LOS: 11 days | Discharge: NURSING, REHAB OR LONG-TERM CARE | DRG: 177 | End: 2019-06-24
Attending: INTERNAL MEDICINE | Admitting: NURSE PRACTITIONER
Payer: MEDICARE

## 2019-06-13 VITALS — DIASTOLIC BLOOD PRESSURE: 79 MMHG | SYSTOLIC BLOOD PRESSURE: 119 MMHG

## 2019-06-13 VITALS — HEIGHT: 64 IN | BODY MASS INDEX: 22.4 KG/M2 | WEIGHT: 131.19 LBS

## 2019-06-13 DIAGNOSIS — S30.820A: ICD-10-CM

## 2019-06-13 DIAGNOSIS — I50.32: ICD-10-CM

## 2019-06-13 DIAGNOSIS — J96.01: ICD-10-CM
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
## 2019-06-13 DIAGNOSIS — K44.9: ICD-10-CM

## 2019-06-13 DIAGNOSIS — G40.909: ICD-10-CM

## 2019-06-13 DIAGNOSIS — E87.6: ICD-10-CM

## 2019-06-13 DIAGNOSIS — K29.01: ICD-10-CM

## 2019-06-13 DIAGNOSIS — D72.829: ICD-10-CM

## 2019-06-13 DIAGNOSIS — J15.9: ICD-10-CM

## 2019-06-13 DIAGNOSIS — B96.20: ICD-10-CM

## 2019-06-13 DIAGNOSIS — R13.10: ICD-10-CM

## 2019-06-13 DIAGNOSIS — K59.09: ICD-10-CM

## 2019-06-13 DIAGNOSIS — Z88.2: ICD-10-CM
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
## 2019-06-13 DIAGNOSIS — Z87.440: ICD-10-CM

## 2019-06-13 DIAGNOSIS — Z16.12: ICD-10-CM

## 2019-06-13 DIAGNOSIS — L89.010: ICD-10-CM
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
## 2019-06-13 DIAGNOSIS — F02.80: ICD-10-CM

## 2019-06-13 DIAGNOSIS — T14.8XXA: ICD-10-CM

## 2019-06-13 DIAGNOSIS — N39.0: ICD-10-CM

## 2019-06-13 DIAGNOSIS — L98.8: ICD-10-CM

## 2019-06-13 DIAGNOSIS — I11.0: ICD-10-CM

## 2019-06-13 DIAGNOSIS — K21.9: ICD-10-CM

## 2019-06-13 DIAGNOSIS — G30.9: ICD-10-CM

## 2019-06-13 DIAGNOSIS — J69.0: Primary | ICD-10-CM
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
## 2019-06-13 LAB
ALBUMIN SERPL BCP-MCNC: 3.3 G/DL (ref 3.4–5)
ALP SERPL-CCNC: 99 U/L (ref 46–116)
ALT SERPL W P-5'-P-CCNC: 19 U/L (ref 12–78)
AST SERPL W P-5'-P-CCNC: 18 U/L (ref 15–37)
BASOPHILS # BLD AUTO: 0.2 /CMM (ref 0–0.2)
BASOPHILS NFR BLD AUTO: 1.5 % (ref 0–2)
BILIRUB DIRECT SERPL-MCNC: 0.1 MG/DL (ref 0–0.2)
BILIRUB SERPL-MCNC: 0.4 MG/DL (ref 0.2–1)
BUN SERPL-MCNC: 11 MG/DL (ref 7–18)
CALCIUM SERPL-MCNC: 9.3 MG/DL (ref 8.5–10.1)
CHLORIDE SERPL-SCNC: 103 MMOL/L (ref 98–107)
CO2 SERPL-SCNC: 25 MMOL/L (ref 21–32)
CREAT SERPL-MCNC: 0.3 MG/DL (ref 0.6–1.3)
EOSINOPHIL NFR BLD AUTO: 0.2 % (ref 0–6)
GLUCOSE SERPL-MCNC: 122 MG/DL (ref 74–106)
HCT VFR BLD AUTO: 41 % (ref 33–45)
HGB BLD-MCNC: 13.4 G/DL (ref 11.5–14.8)
LIPASE SERPL-CCNC: 71 U/L (ref 73–393)
LYMPHOCYTES NFR BLD AUTO: 1.4 /CMM (ref 0.8–4.8)
LYMPHOCYTES NFR BLD AUTO: 12.1 % (ref 20–44)
MCHC RBC AUTO-ENTMCNC: 33 G/DL (ref 31–36)
MCV RBC AUTO: 90 FL (ref 82–100)
MONOCYTES NFR BLD AUTO: 0.6 /CMM (ref 0.1–1.3)
MONOCYTES NFR BLD AUTO: 5.2 % (ref 2–12)
NEUTROPHILS # BLD AUTO: 9.3 /CMM (ref 1.8–8.9)
NEUTROPHILS NFR BLD AUTO: 81 % (ref 43–81)
PH UR STRIP: 7.5 [PH] (ref 5–8)
PLATELET # BLD AUTO: 212 /CMM (ref 150–450)
POTASSIUM SERPL-SCNC: 3.9 MMOL/L (ref 3.5–5.1)
PROT SERPL-MCNC: 7.5 G/DL (ref 6.4–8.2)
PROT UR QL STRIP: 30 MG/DL
RBC # BLD AUTO: 4.5 MIL/UL (ref 4–5.2)
RBC #/AREA URNS HPF: (no result) /HPF (ref 0–2)
SODIUM SERPL-SCNC: 137 MMOL/L (ref 136–145)
UROBILINOGEN UR STRIP-MCNC: 0.2 EU/DL
WBC #/AREA URNS HPF: (no result) /HPF
WBC #/AREA URNS HPF: (no result) /HPF (ref 0–3)
WBC NRBC COR # BLD AUTO: 11.5 K/UL (ref 4.3–11)

## 2019-06-13 PROCEDURE — A4216 STERILE WATER/SALINE, 10 ML: HCPCS

## 2019-06-13 PROCEDURE — A6248 HYDROGEL DRSG GEL FILLER: HCPCS

## 2019-06-13 PROCEDURE — G0378 HOSPITAL OBSERVATION PER HR: HCPCS

## 2019-06-13 RX ADMIN — SODIUM CHLORIDE SCH MLS/HR: 9 INJECTION, SOLUTION INTRAVENOUS at 23:38

## 2019-06-13 RX ADMIN — SODIUM CHLORIDE PRN MLS/HR: 9 INJECTION, SOLUTION INTRAVENOUS at 22:32

## 2019-06-13 RX ADMIN — Medication SCH MG: at 21:00

## 2019-06-13 NOTE — NUR
TELE LVN NOTES

 PT NPO INCLUDING MEDICATION AT THIS TIME AS ORDERED BUT PT DAUGHTER INSISTED TO HAVE SRAVANI NORIEGA , NOTIFY ON CALL ROSI HAN /ACNP AND SHE ORDERED TO CHANGES THE ORDER TO IV 
INSTEAD.

## 2019-06-13 NOTE — NUR
TELE LVN NOTES

 KEPPRA IVP  MG HUNG BY ANOTHER NURSE AS ORDERED. PT REMAIN RESTING WITHOUT ANY ACUTE 
DISTRESS NOTED. WILL CONTINUE MONITORING.

## 2019-06-13 NOTE — NUR
TELE LVN INITIAL NOTES

 ADMIT PT FROM ER VIA [facility]LENA ACCOMPANIED BY ER NURSE AND TECH AS LONG AS FAMILY TOO. DX OF 
UPPER GI BLEED AND UTI. PT CAME FROM [facility] AND PER FAMILY PT HAD COFFEE GROUND THIS 
AFTERNOON BUT AFTER THAT NO MORE N/V NOTED . PT ALERT ORIENTED BUT FARSI SPEAKING ONLY SO 
THE FAMILY IS THE ONE THAT'S PROVIDE INFORMATION . PT RESTING AT THIS TIME WITH EYES CLOSED 
BUT AROUSE TO TOUCH AND RESPONDING WHEN FAMILY SPOKE TO HER. NO SIGNS OF ANY ACUTE DISTRESS 
NOTED AT THIS TIME. RESPIRATION EVEN AND NON-LABORED.SKIN WARM AND DRY TOUCH. NO EDEMA 
NOTED. HEPLOCK ON LEFT AC PATENT AND INTACT. PER DAUGHTER PT IN ON PUREED DIET AND 
ASPIRATION PRECAUTION AND UNABLE TO LIE FLAT TOO LONG. 24 HR CAREGIVER AT THE BEDSIDE PER 
FAMILY REQUEST. TELE APPLIED AND HER CHEST WALL AND EXPLAINED TO HER DAUGHTER WHAT'S THE 
PURPOSE OF IT AND THEY UNDERSTOOD WELL. KEPT PT WARM AND COMFORTABLE AT ALL TIMES. HOB 
ELEVATED AND ASPIRATION PRECAUTION AND SEIZURE PRECAUTION IMPLEMENTED AND OBSERVED. WILL 
CONTINUE MONITORING. BED IN LOW AND LOCK IN POSITION WITH SIDE RAILS X2 UP. SITTER AT THE 
BEDSIDE.

## 2019-06-13 NOTE — NUR
CASA FROM [facility] C/O COFFEE GROUND COLOR EMESIS AT 2:40PM.  
PATIENT A/OXO, FAMILY AT BEDSIDE, BREATHING EVEN AND UNLABORED, NO DISTRESS 
NOTED. NO FURTHER EPISODE OF COFFEE GROUND EMESIS. ATTACHED TO THE MONITOR.

## 2019-06-13 NOTE — NUR
TELE LVN NOTES

 FAMILY REFUSED TO HAVE NGT TO BE INSERTED AS ORDERED EVEN I EXPLAINED TO THEM WHY THE PT 
NEEDED. DAUGHTER INSISTED AND SAYING " DON'T EVER 'EVER INSERTED NGT TO THEIR MOTHER. " PT 
RESTING AT THIS TIME NO N.V NOTED SINCE PT ADMITTED. KEPT HER HOB ELEVATED FOR PT COMFORT. 
TELE SINUS TACH 118 PER MONITOR. WILL CONTINUE MONITORING.

## 2019-06-13 NOTE — NUR
PT TO BE ON A PUREED DIET. PT USUALLY HAS CHICKEN OR FISH PUREE, AND PUREED 
PEARS FOR HER FRUIT. PT CAN ONLY TOLERATE ALMOND MILK.

## 2019-06-13 NOTE — NUR
PT'S DAUGHTER WAS ON THE PHONE AND ASKED THAT THE PT NOT BE LAID FLAT WHEN 
CHANGING HER. SHE IS ON ASPIRATION PRECAUTIONS. PT'S DAUGHTER WANTS TO HAVE THE 
PT'S KEPPRA REDUCED AS SHE SLEEPS  UNTIL "3PM THE NEXT DAY". THE DAUGHTER ALSO 
WANTS TO HAVE A NEUROLOGY CONSULT. DR MAGALLANES WAS NOTIFIED.

## 2019-06-14 VITALS — DIASTOLIC BLOOD PRESSURE: 58 MMHG | SYSTOLIC BLOOD PRESSURE: 109 MMHG

## 2019-06-14 VITALS — DIASTOLIC BLOOD PRESSURE: 81 MMHG | SYSTOLIC BLOOD PRESSURE: 143 MMHG

## 2019-06-14 VITALS — SYSTOLIC BLOOD PRESSURE: 126 MMHG | DIASTOLIC BLOOD PRESSURE: 69 MMHG

## 2019-06-14 VITALS — SYSTOLIC BLOOD PRESSURE: 122 MMHG | DIASTOLIC BLOOD PRESSURE: 56 MMHG

## 2019-06-14 VITALS — SYSTOLIC BLOOD PRESSURE: 120 MMHG | DIASTOLIC BLOOD PRESSURE: 57 MMHG

## 2019-06-14 VITALS — SYSTOLIC BLOOD PRESSURE: 109 MMHG | DIASTOLIC BLOOD PRESSURE: 58 MMHG

## 2019-06-14 LAB
ALBUMIN SERPL BCP-MCNC: 2.7 G/DL (ref 3.4–5)
ALP SERPL-CCNC: 81 U/L (ref 46–116)
ALT SERPL W P-5'-P-CCNC: 15 U/L (ref 12–78)
AST SERPL W P-5'-P-CCNC: 15 U/L (ref 15–37)
BASOPHILS # BLD AUTO: 0 /CMM (ref 0–0.2)
BASOPHILS NFR BLD AUTO: 0.4 % (ref 0–2)
BILIRUB SERPL-MCNC: 0.4 MG/DL (ref 0.2–1)
BUN SERPL-MCNC: 18 MG/DL (ref 7–18)
CALCIUM SERPL-MCNC: 8.6 MG/DL (ref 8.5–10.1)
CHLORIDE SERPL-SCNC: 107 MMOL/L (ref 98–107)
CHOLEST SERPL-MCNC: 145 MG/DL (ref ?–200)
CO2 SERPL-SCNC: 27 MMOL/L (ref 21–32)
CREAT SERPL-MCNC: 0.3 MG/DL (ref 0.6–1.3)
EOSINOPHIL NFR BLD AUTO: 0.4 % (ref 0–6)
GLUCOSE SERPL-MCNC: 97 MG/DL (ref 74–106)
HCT VFR BLD AUTO: 36 % (ref 33–45)
HDLC SERPL-MCNC: 55 MG/DL (ref 40–60)
HEMOCCULT STL QL: NEGATIVE
HGB BLD-MCNC: 11.7 G/DL (ref 11.5–14.8)
LDLC SERPL DIRECT ASSAY-MCNC: 78 MG/DL (ref 0–99)
LYMPHOCYTES NFR BLD AUTO: 1.4 /CMM (ref 0.8–4.8)
LYMPHOCYTES NFR BLD AUTO: 11.7 % (ref 20–44)
MAGNESIUM SERPL-MCNC: 1.9 MG/DL (ref 1.8–2.4)
MCHC RBC AUTO-ENTMCNC: 33 G/DL (ref 31–36)
MCV RBC AUTO: 90 FL (ref 82–100)
MONOCYTES NFR BLD AUTO: 0.6 /CMM (ref 0.1–1.3)
MONOCYTES NFR BLD AUTO: 5 % (ref 2–12)
NEUTROPHILS # BLD AUTO: 9.7 /CMM (ref 1.8–8.9)
NEUTROPHILS NFR BLD AUTO: 82.5 % (ref 43–81)
PHOSPHATE SERPL-MCNC: 3 MG/DL (ref 2.5–4.9)
PLATELET # BLD AUTO: 194 /CMM (ref 150–450)
POTASSIUM SERPL-SCNC: 4.2 MMOL/L (ref 3.5–5.1)
PROT SERPL-MCNC: 6.3 G/DL (ref 6.4–8.2)
RBC # BLD AUTO: 3.99 MIL/UL (ref 4–5.2)
SODIUM SERPL-SCNC: 140 MMOL/L (ref 136–145)
TRIGL SERPL-MCNC: 75 MG/DL (ref 30–150)
TSH SERPL DL<=0.005 MIU/L-ACNC: 1.16 UIU/ML (ref 0.36–3.74)
WBC NRBC COR # BLD AUTO: 11.8 K/UL (ref 4.3–11)

## 2019-06-14 RX ADMIN — CEPHALEXIN SCH MG: 500 CAPSULE ORAL at 10:47

## 2019-06-14 RX ADMIN — SODIUM CHLORIDE PRN MLS/HR: 9 INJECTION, SOLUTION INTRAVENOUS at 14:13

## 2019-06-14 RX ADMIN — Medication SCH G: at 17:49

## 2019-06-14 RX ADMIN — ESOMEPRAZOLE SODIUM SCH MG: 40 INJECTION, POWDER, LYOPHILIZED, FOR SOLUTION INTRAVENOUS at 10:54

## 2019-06-14 RX ADMIN — Medication SCH MG: at 22:32

## 2019-06-14 RX ADMIN — Medication SCH TAB: at 10:47

## 2019-06-14 RX ADMIN — LEVETIRACETAM SCH MG: 100 SOLUTION ORAL at 22:32

## 2019-06-14 RX ADMIN — FERROUS SULFATE TAB 325 MG (65 MG ELEMENTAL FE) SCH MG: 325 (65 FE) TAB at 10:48

## 2019-06-14 RX ADMIN — CALCIUM CARBONATE (ANTACID) CHEW TAB 500 MG SCH MG: 500 CHEW TAB at 10:47

## 2019-06-14 RX ADMIN — CEPHALEXIN SCH MG: 500 CAPSULE ORAL at 17:49

## 2019-06-14 RX ADMIN — Medication SCH MG: at 10:48

## 2019-06-14 RX ADMIN — SODIUM CHLORIDE SCH MLS/HR: 9 INJECTION, SOLUTION INTRAVENOUS at 08:53

## 2019-06-14 RX ADMIN — FOLIC ACID SCH MG: 1 TABLET ORAL at 10:47

## 2019-06-14 RX ADMIN — Medication SCH G: at 22:32

## 2019-06-14 RX ADMIN — OXYCODONE HYDROCHLORIDE AND ACETAMINOPHEN SCH MG: 500 TABLET ORAL at 10:48

## 2019-06-14 RX ADMIN — LACTOBACILLUS TAB SCH EACH: TAB at 17:49

## 2019-06-14 RX ADMIN — LACTOBACILLUS TAB SCH EACH: TAB at 10:47

## 2019-06-14 RX ADMIN — Medication SCH MG: at 13:15

## 2019-06-14 RX ADMIN — Medication SCH MG: at 17:49

## 2019-06-14 RX ADMIN — VITAMIN D, TAB 1000IU (100/BT) SCH UNIT: 25 TAB at 10:47

## 2019-06-14 NOTE — NUR
PT RESTING IN BED WITH DAUGHTER AT BEDSIDE.%DINNER AND IS AWAKE THIS TIME.ABLE TO 
TAKE MEDS AND DINNER WELL.EEG DONE AT 1700.NO S/S OF PAIN OR DISTRESS.TURNED EVERY TWO HRS. 
CALL LIGHT PLACED WITHIN REACH.

## 2019-06-14 NOTE — NUR
tele lvn notes

 remain asleep without any acute distress or any signs of any seizure noted. tele Sinus Tach 
heart rate 105 per monitor.

## 2019-06-14 NOTE — NUR
PT'S DAUGHTER KUSUM CALLED AND WAS SCREAMING ON THE PHONE WHEN SHE FOUND OUT THAT PT IS 
GOING TO RECEIVE KEPPRA IV THIS MORNING AND WAS RAISING HELL THAT HER MOM WILL GET 
OVERDOSED,KUSUM STATED THAT HER MOM SHOULD ONLY BE RECEIVING KEPPRA IV ONLY AT NIGHTIME. 
WILL INFORM MD.

## 2019-06-14 NOTE — NUR
Tele RN notes

 pt sleeping comfortably but arousable with IVF NS at 75ml.hr infusing on her left ac patent 
and intact , no redness noted. kept her warm and comfortable at all times. caregiver at the 
bedside at this time. will continue monitoring. place call light within reach.

## 2019-06-14 NOTE — NUR
tele lvn closing

pt back to rest after morning care done. stable niurka the night and slept . all due meds given 
and all needs met. IVF NS at 75ml.hr still infusing on her left ac. Tele Sinus Tach heart 
rate 105 per monitor. Reposition her for comfort and kept her warm and comfortable at all 
times. aspiration precaution and seizure precaution implemented and observed. endorse to am 
nurse Payton for continuity of care. caregiver at the bedside.

## 2019-06-14 NOTE — NUR
RN OPENING NOTES



RECEIVED REPORT FROM DAYSHIFT RN, KAYLAH DIAZ FOUND Pt AWAKE, RESTING IN BED, WITH PERSONAL 
CAREGIVER AT BEDSIDE. NO S/S OF ACUTE DISTRESS OR SOB NOTED. RESPIRATIONS EVEN AND UNLABORED 
WITH EQUAL CHEST RISE AND FALL. Pt IS A/0X1-2, FARSI SPEAKING ONLY; BASELINE DEMENTIA. IV 
ACCESS ON LAC #20G, IVF NS @75ML/HR. SAFETY MEASURES IN PLACE. BED LOW, LOCKED, HOB 
ELEVATED, SIDE RAILS UP, CALL LIGHT AND BEDSIDE TABLE WITHIN REACH. BED ALARM ON. WILL 
CONTINUE TO MONITOR Pt's CONDITION AND SAFETY THROUGHOUT THE NIGHT.

## 2019-06-14 NOTE — NUR
tele lvn notes

 pt sleeping comfortably in bed with IVF NS at 75ml.hr infusing on her left ac patent and 
intact , no redness noted. kept her warm and comfortable at all times. caregiver at the 
bedside at this time. will continue monitoring. place call light at reach.

## 2019-06-15 VITALS — SYSTOLIC BLOOD PRESSURE: 91 MMHG | DIASTOLIC BLOOD PRESSURE: 56 MMHG

## 2019-06-15 VITALS — SYSTOLIC BLOOD PRESSURE: 121 MMHG | DIASTOLIC BLOOD PRESSURE: 70 MMHG

## 2019-06-15 LAB
BASOPHILS # BLD AUTO: 0 /CMM (ref 0–0.2)
BASOPHILS NFR BLD AUTO: 0.6 % (ref 0–2)
BUN SERPL-MCNC: 16 MG/DL (ref 7–18)
CALCIUM SERPL-MCNC: 7.7 MG/DL (ref 8.5–10.1)
CHLORIDE SERPL-SCNC: 107 MMOL/L (ref 98–107)
CO2 SERPL-SCNC: 21 MMOL/L (ref 21–32)
CREAT SERPL-MCNC: 0.3 MG/DL (ref 0.6–1.3)
EOSINOPHIL NFR BLD AUTO: 1.9 % (ref 0–6)
GLUCOSE SERPL-MCNC: 87 MG/DL (ref 74–106)
HCT VFR BLD AUTO: 29 % (ref 33–45)
HGB BLD-MCNC: 9.5 G/DL (ref 11.5–14.8)
LYMPHOCYTES NFR BLD AUTO: 1.5 /CMM (ref 0.8–4.8)
LYMPHOCYTES NFR BLD AUTO: 19.7 % (ref 20–44)
MAGNESIUM SERPL-MCNC: 1.9 MG/DL (ref 1.8–2.4)
MCHC RBC AUTO-ENTMCNC: 33 G/DL (ref 31–36)
MCV RBC AUTO: 90 FL (ref 82–100)
MONOCYTES NFR BLD AUTO: 0.3 /CMM (ref 0.1–1.3)
MONOCYTES NFR BLD AUTO: 4.7 % (ref 2–12)
NEUTROPHILS # BLD AUTO: 5.4 /CMM (ref 1.8–8.9)
NEUTROPHILS NFR BLD AUTO: 73.1 % (ref 43–81)
PHOSPHATE SERPL-MCNC: 2.7 MG/DL (ref 2.5–4.9)
PLATELET # BLD AUTO: 139 /CMM (ref 150–450)
POTASSIUM SERPL-SCNC: 3.6 MMOL/L (ref 3.5–5.1)
RBC # BLD AUTO: 3.18 MIL/UL (ref 4–5.2)
SODIUM SERPL-SCNC: 137 MMOL/L (ref 136–145)
WBC NRBC COR # BLD AUTO: 7.4 K/UL (ref 4.3–11)

## 2019-06-15 RX ADMIN — LACTOBACILLUS TAB SCH EACH: TAB at 09:34

## 2019-06-15 RX ADMIN — Medication SCH G: at 21:25

## 2019-06-15 RX ADMIN — Medication SCH MG: at 16:32

## 2019-06-15 RX ADMIN — Medication SCH GM: at 09:35

## 2019-06-15 RX ADMIN — SODIUM CHLORIDE PRN MLS/HR: 9 INJECTION, SOLUTION INTRAVENOUS at 04:15

## 2019-06-15 RX ADMIN — MEROPENEM SCH MLS/HR: 1 INJECTION INTRAVENOUS at 21:25

## 2019-06-15 RX ADMIN — LACTOBACILLUS TAB SCH EACH: TAB at 16:32

## 2019-06-15 RX ADMIN — Medication SCH G: at 08:39

## 2019-06-15 RX ADMIN — LEVETIRACETAM SCH MG: 100 SOLUTION ORAL at 21:45

## 2019-06-15 RX ADMIN — CALCIUM CARBONATE (ANTACID) CHEW TAB 500 MG SCH MG: 500 CHEW TAB at 09:37

## 2019-06-15 RX ADMIN — Medication SCH TAB: at 09:34

## 2019-06-15 RX ADMIN — CEPHALEXIN SCH MG: 500 CAPSULE ORAL at 09:34

## 2019-06-15 RX ADMIN — Medication SCH G: at 16:32

## 2019-06-15 RX ADMIN — Medication SCH MG: at 21:25

## 2019-06-15 RX ADMIN — Medication SCH MG: at 09:34

## 2019-06-15 RX ADMIN — ESOMEPRAZOLE SODIUM SCH MG: 40 INJECTION, POWDER, LYOPHILIZED, FOR SOLUTION INTRAVENOUS at 21:27

## 2019-06-15 RX ADMIN — Medication SCH MG: at 12:16

## 2019-06-15 RX ADMIN — Medication SCH G: at 12:16

## 2019-06-15 RX ADMIN — ESOMEPRAZOLE SODIUM SCH MG: 40 INJECTION, POWDER, LYOPHILIZED, FOR SOLUTION INTRAVENOUS at 09:29

## 2019-06-15 RX ADMIN — FOLIC ACID SCH MG: 1 TABLET ORAL at 09:34

## 2019-06-15 RX ADMIN — MEROPENEM SCH MLS/HR: 1 INJECTION INTRAVENOUS at 13:20

## 2019-06-15 RX ADMIN — SODIUM CHLORIDE PRN MLS/HR: 9 INJECTION, SOLUTION INTRAVENOUS at 19:14

## 2019-06-15 RX ADMIN — FERROUS SULFATE TAB 325 MG (65 MG ELEMENTAL FE) SCH MG: 325 (65 FE) TAB at 09:33

## 2019-06-15 RX ADMIN — OXYCODONE HYDROCHLORIDE AND ACETAMINOPHEN SCH MG: 500 TABLET ORAL at 09:34

## 2019-06-15 RX ADMIN — MODAFINIL SCH MG: 100 TABLET ORAL at 09:34

## 2019-06-15 RX ADMIN — VITAMIN D, TAB 1000IU (100/BT) SCH UNIT: 25 TAB at 09:33

## 2019-06-15 NOTE — NUR
RN CLOSING NOTES



NO SIGNIFICANT CHANGES IN Pt's CONDITION. Pt REMAINS STABLE PER BASELINE. NO S/S OF ACUTE 
DISTRESS OR SOB NOTED DURING THE NIGHT. ALL NEEDS MET AND ATTENDED TO. SAFETY MEASURES IN 
PLACE. Pt IS RESTING COMFORTABLY IN BED, RESPIRATIONS EVEN AND UNLABORED, WITH EQUAL CHEST 
RISE AND FALL. CAREGIVER AT BEDSIDE. WILL ENDORSE TO DAYSHIFT RN FOR Pt's STACEY.

## 2019-06-15 NOTE — NUR
MS RN NOTE



RECEIVED PT IN STABLE CONDITION. PT A&O X 1-2, GUEST CURRENTLY AT BEDSIDE. NO SIGNS OF SOB 
OR DISTRESS, NO INDICATIONS OF PAIN. IV IN PLACE WITH IVF INFUSING. ALL  CURRENT NEEDS MET. 
BED LOW, LOCKED, UPPER RAILS UP, AND CALL LIGHT WITHIN REACH. WILL CONT TO MONITOR.

## 2019-06-15 NOTE — NUR
Nurse Notes:

   received patient resting in bed, caregiver at bedside.  Normal saline is infusing at 75 
cc per hour left AC,  appears in no respiratory distress,  appears in no pain.

## 2019-06-15 NOTE — NUR
Nurse Notes:

  patient had bowel movement, patient zinc oxide and remedy was applied to buttocks and mep 
lix was applied,  dry skin is starting to open on right buttocks.  Patient was able to 
swallow her medications crushed in applesauce.

## 2019-06-15 NOTE — NUR
Nurse Notes:

   report given to Ivon BAUMANN.  Caregiver remains at bedside, Patient is alseep at present 
time.  In no respiratory distress.

## 2019-06-15 NOTE — NUR
M/S RN CLOSING NOTES



PATIENT A/O X1 TO NAME ONLY, BASELINE OF ABLE TO MAKE EYE CONTACT, MOSTLY NON VERBAL. 
CAREGIVER AND CLAIRE (SON) PRESENT ON BEDSIDE. RESPIRATION EVEN AND NON LABORED WITH NO ACUTE 
RESPIRATORY DISTRESS, ABDOMEN SOFT AND NON DISTENDED WITH ACTIVE BOWEL SOUNDS. SKIN WARM TO 
TOUCH AND DRY. ASSESSED NO PAIN AND DISCOMFORT. INCONTINENT BOWEL AND BLADDER, ON DIAPER. BM 
X 1 DURING THE SHIFT. IV SITE AT LEFT AC GAUGE 20 WITH NO S/SX ON INFILTRATION. RUNNING NS 
AT 75 ML/HR. ALL CONCERNS ADDRESSED. PLACED CALL LIGHT WITHIN REACH FOR SAFETY. ENDORSED 
PATIENT TO NEXT SHIFT.

## 2019-06-16 VITALS — SYSTOLIC BLOOD PRESSURE: 117 MMHG | DIASTOLIC BLOOD PRESSURE: 60 MMHG

## 2019-06-16 VITALS — DIASTOLIC BLOOD PRESSURE: 70 MMHG | SYSTOLIC BLOOD PRESSURE: 126 MMHG

## 2019-06-16 VITALS — DIASTOLIC BLOOD PRESSURE: 75 MMHG | SYSTOLIC BLOOD PRESSURE: 141 MMHG

## 2019-06-16 LAB
BASOPHILS # BLD AUTO: 0 /CMM (ref 0–0.2)
BASOPHILS NFR BLD AUTO: 0.6 % (ref 0–2)
BUN SERPL-MCNC: 7 MG/DL (ref 7–18)
CALCIUM SERPL-MCNC: 8 MG/DL (ref 8.5–10.1)
CHLORIDE SERPL-SCNC: 108 MMOL/L (ref 98–107)
CO2 SERPL-SCNC: 26 MMOL/L (ref 21–32)
CREAT SERPL-MCNC: 0.3 MG/DL (ref 0.6–1.3)
EOSINOPHIL NFR BLD AUTO: 2.8 % (ref 0–6)
GLUCOSE SERPL-MCNC: 89 MG/DL (ref 74–106)
HCT VFR BLD AUTO: 29 % (ref 33–45)
HGB BLD-MCNC: 9.6 G/DL (ref 11.5–14.8)
LYMPHOCYTES NFR BLD AUTO: 1.4 /CMM (ref 0.8–4.8)
LYMPHOCYTES NFR BLD AUTO: 25.2 % (ref 20–44)
MAGNESIUM SERPL-MCNC: 1.8 MG/DL (ref 1.8–2.4)
MCHC RBC AUTO-ENTMCNC: 33 G/DL (ref 31–36)
MCV RBC AUTO: 91 FL (ref 82–100)
MONOCYTES NFR BLD AUTO: 0.3 /CMM (ref 0.1–1.3)
MONOCYTES NFR BLD AUTO: 4.8 % (ref 2–12)
NEUTROPHILS # BLD AUTO: 3.8 /CMM (ref 1.8–8.9)
NEUTROPHILS NFR BLD AUTO: 66.6 % (ref 43–81)
PHOSPHATE SERPL-MCNC: 2.5 MG/DL (ref 2.5–4.9)
PLATELET # BLD AUTO: 129 /CMM (ref 150–450)
POTASSIUM SERPL-SCNC: 3.7 MMOL/L (ref 3.5–5.1)
RBC # BLD AUTO: 3.19 MIL/UL (ref 4–5.2)
SODIUM SERPL-SCNC: 141 MMOL/L (ref 136–145)
WBC NRBC COR # BLD AUTO: 5.7 K/UL (ref 4.3–11)

## 2019-06-16 RX ADMIN — MEROPENEM SCH MLS/HR: 1 INJECTION INTRAVENOUS at 12:26

## 2019-06-16 RX ADMIN — LACTOBACILLUS TAB SCH EACH: TAB at 17:00

## 2019-06-16 RX ADMIN — LEVETIRACETAM SCH MG: 100 SOLUTION ORAL at 21:02

## 2019-06-16 RX ADMIN — VITAMIN D, TAB 1000IU (100/BT) SCH UNIT: 25 TAB at 08:32

## 2019-06-16 RX ADMIN — CALCIUM CARBONATE (ANTACID) CHEW TAB 500 MG SCH MG: 500 CHEW TAB at 08:31

## 2019-06-16 RX ADMIN — MEROPENEM SCH MLS/HR: 1 INJECTION INTRAVENOUS at 04:51

## 2019-06-16 RX ADMIN — Medication SCH G: at 17:30

## 2019-06-16 RX ADMIN — Medication SCH TAB: at 08:32

## 2019-06-16 RX ADMIN — MODAFINIL SCH MG: 100 TABLET ORAL at 08:31

## 2019-06-16 RX ADMIN — Medication SCH MG: at 21:03

## 2019-06-16 RX ADMIN — ESOMEPRAZOLE SODIUM SCH MG: 40 INJECTION, POWDER, LYOPHILIZED, FOR SOLUTION INTRAVENOUS at 08:44

## 2019-06-16 RX ADMIN — Medication SCH MG: at 17:00

## 2019-06-16 RX ADMIN — SODIUM CHLORIDE PRN MLS/HR: 9 INJECTION, SOLUTION INTRAVENOUS at 22:09

## 2019-06-16 RX ADMIN — Medication SCH MG: at 08:31

## 2019-06-16 RX ADMIN — Medication SCH G: at 17:50

## 2019-06-16 RX ADMIN — Medication SCH MG: at 17:50

## 2019-06-16 RX ADMIN — LACTOBACILLUS TAB SCH EACH: TAB at 08:32

## 2019-06-16 RX ADMIN — LACTOBACILLUS TAB SCH EACH: TAB at 17:50

## 2019-06-16 RX ADMIN — ESOMEPRAZOLE SODIUM SCH MG: 40 INJECTION, POWDER, LYOPHILIZED, FOR SOLUTION INTRAVENOUS at 21:02

## 2019-06-16 RX ADMIN — FERROUS SULFATE TAB 325 MG (65 MG ELEMENTAL FE) SCH MG: 325 (65 FE) TAB at 08:32

## 2019-06-16 RX ADMIN — Medication SCH G: at 12:17

## 2019-06-16 RX ADMIN — Medication SCH GM: at 10:06

## 2019-06-16 RX ADMIN — FOLIC ACID SCH MG: 1 TABLET ORAL at 08:32

## 2019-06-16 RX ADMIN — Medication SCH G: at 08:12

## 2019-06-16 RX ADMIN — DEXTROSE MONOHYDRATE SCH MLS/HR: 50 INJECTION, SOLUTION INTRAVENOUS at 20:00

## 2019-06-16 RX ADMIN — Medication SCH MG: at 12:17

## 2019-06-16 RX ADMIN — Medication SCH G: at 21:03

## 2019-06-16 RX ADMIN — OXYCODONE HYDROCHLORIDE AND ACETAMINOPHEN SCH MG: 500 TABLET ORAL at 08:32

## 2019-06-16 NOTE — NUR
MS RN NOTES:



NO NEED FOR BLADDER SCAN. PT WAS CHANGED AND DIAPER AND DRAW SHEET WAS FULLY SOAKED. WILL 
CONTINUE TO MONITOR.

## 2019-06-16 NOTE — NUR
MS RN NOTE



PT IN STABLE CONDITION. PT A&O X 1-2, CURRENTLY ASLEEP. NO SIGNS OF SOB OR DISTRESS, NO 
INDICATIONS OF PAIN. IV IN PLACE WITH IV ATB INFUSING. PT. REPOSITIONED Q2H PER UNIT 
PROTOCOL. ALL  CURRENT NEEDS MET. BED LOW, LOCKED, UPPER RAILS UP, AND CALL LIGHT WITHIN 
REACH. WILL CONT TO MONITOR AND ENDORSE TO NEXT SHIFT FOR STACEY.

## 2019-06-16 NOTE — NUR
M/S RN NOTES



PATIENT LYING IN BED RESTING, ALERT AND ORIENTED X 1, NON VERBAL. DAUGHTER AT BEDSIDE. 
PATIENT IN NO RESPIRATORY DISTRESS AND NO S/S OF PAIN AT THIS TIME. PATIENT'S SKIN WARM TO 
TOUCH, IVF INFUSING ON THE LAC, INTACT AND PATENT. PATIENT'S NEEDS ATTENDED. BED ON LOWEST 
LOCKED POSITION, CALL LIGHT WITHIN REACH. WILL CONTINUE TO MONITOR.

## 2019-06-16 NOTE — NUR
MS RN OPENING NOTES:



RECEIVED PT WITH 2 DTRS AT BEDSIDE FEEDING P.T PT APPEARS TO BE APHASIC. PT IS FARSI 
UNDERSTANDING ONLY ACCORDING TO FAMILY. PT IN HIGH HADDAD'S POSITION FOR NOW. PT HAS IV ON L 
AC #20G AND SI BEING INFUSED WITH IV NS AT 75ML/HR. L ARM NOTED SWELLING AND ELEVATED WITH A 
PILLOW. PT'S DTRS REQUESTING FOR DIGITAL DISIMPACTION AND NO ENEMAS. DTRS VERY CONCERNED 
ABOUT AMIKACIN AND WANTS TO SPEAK TO DR. GUERRERO. BED KEPT IN LOW, LOCKED POSITION, AND SIDE 
RAILS X 2UP. BED ALARM ACTIVATED. WILL CONTINUE TO MONITOR PT.

## 2019-06-16 NOTE — NUR
MS RN NOTES:



AFTER LONG DISCUSSION AND BACK AND FORTH WITH THE FAMILY, THE 2 DTRS AT BEDSIDE DOES NOT 
WANT THE SCHEDULED AMIKICIN IV GIVEN UNTIL SEEN BY DR. GUERRERO/WHOEVER IS COVER DR. GUERRERO 
TOMORROW IN THE AM. WILL HOLD AMIKIN IV PER FAMILY'S REQUEST. INFORMED THAT MERREM WAS D/C 
FROM ID  D/T SECONDARY TO SEIZURE. FAMILY MEMBERS STILL DOES NOT WANT AMIKICIN.

## 2019-06-16 NOTE — NUR
M/S RN NOTES



DR. GUERRERO CONTACTED PER PATIENT'S FAMILY REQUEST TO HAVE AN ID CONSULT FOR THEIR MOM. DR. YANEZ IS AWARE THAT PATIENT'S FAMILY REQUESTED FOR SECOND OPINION FOR AN ID CONSULT AFTER 
DR. PRUITT HAVE SEEN THE PATIENT EARLIER TODAY WHO ORDERED FOR AMIKIN 350MG IV. DR. GUERRERO 
GAVE A NUMBER TO CALL DR. NICHOLAS FOR ANY PATIENT'S ISSUES BECAUSE DR. NICHOLAS IS 
COVERING. CALLED THE NUMBER AND SPOKE WITH REVA AND TOLD HER ABOUT PATIENT'S DAUGHTERS' 
CONCERN ABOUT THE ABX AMIKIN 350MG THAT THEY MIGHT NOT WANT THAT FOR THEIR MOM AND ITS DUE 
AT 2000. ENDORSED TO NIGHT NURSE THAT FAMILY MIGHT REQUEST FOR THE ABX TO BE HELD AND FAMILY 
IS AWARE THAT ID CONSULT IS TO BE DONE TOMORROW.

## 2019-06-17 VITALS — DIASTOLIC BLOOD PRESSURE: 82 MMHG | SYSTOLIC BLOOD PRESSURE: 129 MMHG

## 2019-06-17 VITALS — DIASTOLIC BLOOD PRESSURE: 62 MMHG | SYSTOLIC BLOOD PRESSURE: 116 MMHG

## 2019-06-17 VITALS — SYSTOLIC BLOOD PRESSURE: 125 MMHG | DIASTOLIC BLOOD PRESSURE: 65 MMHG

## 2019-06-17 LAB
BASOPHILS # BLD AUTO: 0 /CMM (ref 0–0.2)
BASOPHILS NFR BLD AUTO: 0.5 % (ref 0–2)
BUN SERPL-MCNC: 4 MG/DL (ref 7–18)
CALCIUM SERPL-MCNC: 7.9 MG/DL (ref 8.5–10.1)
CHLORIDE SERPL-SCNC: 106 MMOL/L (ref 98–107)
CO2 SERPL-SCNC: 25 MMOL/L (ref 21–32)
CREAT SERPL-MCNC: 0.3 MG/DL (ref 0.6–1.3)
EOSINOPHIL NFR BLD AUTO: 2.5 % (ref 0–6)
GLUCOSE SERPL-MCNC: 90 MG/DL (ref 74–106)
HCT VFR BLD AUTO: 30 % (ref 33–45)
HGB BLD-MCNC: 9.9 G/DL (ref 11.5–14.8)
LYMPHOCYTES NFR BLD AUTO: 1.4 /CMM (ref 0.8–4.8)
LYMPHOCYTES NFR BLD AUTO: 27.9 % (ref 20–44)
MAGNESIUM SERPL-MCNC: 1.8 MG/DL (ref 1.8–2.4)
MCHC RBC AUTO-ENTMCNC: 33 G/DL (ref 31–36)
MCV RBC AUTO: 90 FL (ref 82–100)
MONOCYTES NFR BLD AUTO: 0.3 /CMM (ref 0.1–1.3)
MONOCYTES NFR BLD AUTO: 5.3 % (ref 2–12)
NEUTROPHILS # BLD AUTO: 3.3 /CMM (ref 1.8–8.9)
NEUTROPHILS NFR BLD AUTO: 63.8 % (ref 43–81)
PHOSPHATE SERPL-MCNC: 2.1 MG/DL (ref 2.5–4.9)
PLATELET # BLD AUTO: 166 /CMM (ref 150–450)
POTASSIUM SERPL-SCNC: 3.2 MMOL/L (ref 3.5–5.1)
RBC # BLD AUTO: 3.31 MIL/UL (ref 4–5.2)
SODIUM SERPL-SCNC: 142 MMOL/L (ref 136–145)
WBC NRBC COR # BLD AUTO: 5.1 K/UL (ref 4.3–11)

## 2019-06-17 RX ADMIN — FERROUS SULFATE TAB 325 MG (65 MG ELEMENTAL FE) SCH MG: 325 (65 FE) TAB at 09:11

## 2019-06-17 RX ADMIN — POTASSIUM CHLORIDE SCH MEQ: 1.5 POWDER, FOR SOLUTION ORAL at 12:13

## 2019-06-17 RX ADMIN — VITAMIN D, TAB 1000IU (100/BT) SCH UNIT: 25 TAB at 09:17

## 2019-06-17 RX ADMIN — Medication SCH G: at 12:13

## 2019-06-17 RX ADMIN — Medication SCH MG: at 09:16

## 2019-06-17 RX ADMIN — Medication SCH G: at 21:12

## 2019-06-17 RX ADMIN — Medication SCH MG: at 12:20

## 2019-06-17 RX ADMIN — LEVETIRACETAM SCH MG: 100 SOLUTION ORAL at 21:13

## 2019-06-17 RX ADMIN — CALCIUM CARBONATE (ANTACID) CHEW TAB 500 MG SCH MG: 500 CHEW TAB at 09:16

## 2019-06-17 RX ADMIN — POTASSIUM CHLORIDE SCH MEQ: 1.5 POWDER, FOR SOLUTION ORAL at 10:55

## 2019-06-17 RX ADMIN — MODAFINIL SCH MG: 100 TABLET ORAL at 09:16

## 2019-06-17 RX ADMIN — Medication SCH TAB: at 09:16

## 2019-06-17 RX ADMIN — PANTOPRAZOLE SODIUM SCH MG: 40 TABLET, DELAYED RELEASE ORAL at 21:12

## 2019-06-17 RX ADMIN — OXYCODONE HYDROCHLORIDE AND ACETAMINOPHEN SCH MG: 500 TABLET ORAL at 09:16

## 2019-06-17 RX ADMIN — SODIUM CHLORIDE PRN MLS/HR: 9 INJECTION, SOLUTION INTRAVENOUS at 17:38

## 2019-06-17 RX ADMIN — Medication SCH MG: at 21:13

## 2019-06-17 RX ADMIN — Medication SCH G: at 17:34

## 2019-06-17 RX ADMIN — LACTOBACILLUS TAB SCH EACH: TAB at 09:15

## 2019-06-17 RX ADMIN — LACTOBACILLUS TAB SCH EACH: TAB at 17:33

## 2019-06-17 RX ADMIN — DEXTROSE MONOHYDRATE SCH MLS/HR: 50 INJECTION, SOLUTION INTRAVENOUS at 20:54

## 2019-06-17 RX ADMIN — Medication SCH GM: at 09:17

## 2019-06-17 RX ADMIN — Medication SCH MG: at 17:34

## 2019-06-17 RX ADMIN — Medication SCH G: at 07:51

## 2019-06-17 RX ADMIN — FOLIC ACID SCH MG: 1 TABLET ORAL at 09:11

## 2019-06-17 RX ADMIN — ESOMEPRAZOLE SODIUM SCH MG: 40 INJECTION, POWDER, LYOPHILIZED, FOR SOLUTION INTRAVENOUS at 09:10

## 2019-06-17 NOTE — NUR
MS RN CLOSING NOTES

Patient asleep and resting in bed. A/O x 1. VS stable with no acute distress. Breathing even 
and unlabored on room air via NC with no respiratory distress, SPO2 97%. Patient in 
semi-Fowlers position. No signs and symptoms of pain. 20g PIV on LAC clean, dry, intact and 
flushing well with IVF NS running at 75ml/hr. Noted LEFT ARM edema, elevated on one pillow. 
Safety precautions in place. Bed locked and set to lowest position with side rails x 2. All 
needs rendered at this time. Caregiver at bedside. Will endorse plan of care to oncoming 
shift.

## 2019-06-17 NOTE — NUR
MS RN NOTES 



RECEIVED PATIENT AWAKE IN BED WITH NO DISTRESS NOTED. CALL LIGHT WITHIN REACH. CAREGIVER AT 
BEDSIDE. NO FACIAL GRIMACING OR GROANING TO INDICATE PAIN OR DISCOMFORT. PERIPHERAL LINE 
INTACT AND PATENT. BED IN LOW LOCK SETTING. ALL BELONGINGS KEPT NEAR BEDSIDE. WILL CONTINUE 
TO MONITOR.

## 2019-06-17 NOTE — NUR
MS RN NOTES

Corrie (daughter) called and discussed concerns about IV ABX treatment and ID consult. 
Notified Geno regarding these concerns at this time. Will continue to monitor.

## 2019-06-17 NOTE — NUR
MS RN NOTES

Patient seen by wound care consult/assessment with Katy BAUER at this time. Noted superficial 
opening of wound on RIGHT BUTTOCK. Per PA, apply hydrogel on wound and z-guard on sacral 
area and then cover with Mepliex. Katy BAUER spoke to daughter via phone at this time and 
notified family of plan of care. Will continue to monitor.

## 2019-06-17 NOTE — NUR
MS RN CLOSING NOTES:



ALL NEEDS WERE ATTENDED AND ANTICIPATED FOR. PT KEPT CLEAN, DRY, AND COMFORTABLE. WOUND TX 
PERFORMED AS ORDERED. PT TURN AND REPOSITIONED Q2HRS. PT ON 1LPM VIA NC AND IS TOLERATING 
WELL. NO SOB NOTED. NO S/S OF DISTRESS. CAREGIVER AT BEDSIDE. IV REMAINS INTACT AND IS BEING 
INFUSED WITH IV NS AT 75ML/HR. LEFT ARM ELEVATED WITH PILLOWS AS NOTED SWELLING. BED ALARM 
ACTIVATED. BED KEPT IN LOW, LOCKED POSITION, AND SIDE RAILS X 2UP. ENDORSED TO AM NURSE FOR 
STACEY.

## 2019-06-17 NOTE — NUR
MS RN NOTES

Patient seen by ID consult, Ericka NP at this time. Ericka spoke to daughter regarding 
plan of care via phone. Will continue to monitor.

## 2019-06-17 NOTE — NUR
MS RN OPENING NOTES

Received Patient asleep and resting in bed. A/O x 1. VS stable with no acute distress. 
Breathing even and unlabored on 1LPM via NC with no respiratory distress, SPO2 98%. Patient 
in semi-Fowlers position. No signs and symptoms of pain. 20g PIV on LAC clean, dry, intact 
and flushing well with IVF NS running at 75ml/hr. Noted LEFT ARM edema, elevated on one 
pillow. Safety precautions in place. Bed locked and set to lowest position with side rails x 
2. All needs rendered at this time. Caregiver at bedside. Will continue to monitor.

## 2019-06-17 NOTE — NUR
MS RN NOTES

Spoke to Corrie (daughter) in regards to ID consult. Notified daughter that ID consult 
Ericka LARRY will be seeing the Patient. Notified daughter that Geno BYERS was informed of 
discharge appeal and consults. Will continue to monitor.

## 2019-06-18 VITALS — SYSTOLIC BLOOD PRESSURE: 129 MMHG | DIASTOLIC BLOOD PRESSURE: 78 MMHG

## 2019-06-18 VITALS — SYSTOLIC BLOOD PRESSURE: 102 MMHG | DIASTOLIC BLOOD PRESSURE: 61 MMHG

## 2019-06-18 VITALS — SYSTOLIC BLOOD PRESSURE: 107 MMHG | DIASTOLIC BLOOD PRESSURE: 63 MMHG

## 2019-06-18 LAB
BASOPHILS # BLD AUTO: 0.1 /CMM (ref 0–0.2)
BASOPHILS NFR BLD AUTO: 1.1 % (ref 0–2)
BUN SERPL-MCNC: 4 MG/DL (ref 7–18)
CALCIUM SERPL-MCNC: 8.5 MG/DL (ref 8.5–10.1)
CHLORIDE SERPL-SCNC: 105 MMOL/L (ref 98–107)
CO2 SERPL-SCNC: 26 MMOL/L (ref 21–32)
CREAT SERPL-MCNC: 0.2 MG/DL (ref 0.6–1.3)
EOSINOPHIL NFR BLD AUTO: 2.6 % (ref 0–6)
GLUCOSE SERPL-MCNC: 84 MG/DL (ref 74–106)
HCT VFR BLD AUTO: 33 % (ref 33–45)
HGB BLD-MCNC: 10.8 G/DL (ref 11.5–14.8)
LYMPHOCYTES NFR BLD AUTO: 2.4 /CMM (ref 0.8–4.8)
LYMPHOCYTES NFR BLD AUTO: 42.2 % (ref 20–44)
MAGNESIUM SERPL-MCNC: 1.8 MG/DL (ref 1.8–2.4)
MCHC RBC AUTO-ENTMCNC: 33 G/DL (ref 31–36)
MCV RBC AUTO: 91 FL (ref 82–100)
MONOCYTES NFR BLD AUTO: 0.3 /CMM (ref 0.1–1.3)
MONOCYTES NFR BLD AUTO: 4.8 % (ref 2–12)
NEUTROPHILS # BLD AUTO: 2.8 /CMM (ref 1.8–8.9)
NEUTROPHILS NFR BLD AUTO: 49.3 % (ref 43–81)
PHOSPHATE SERPL-MCNC: 3.1 MG/DL (ref 2.5–4.9)
PLATELET # BLD AUTO: 121 /CMM (ref 150–450)
POTASSIUM SERPL-SCNC: 3.8 MMOL/L (ref 3.5–5.1)
RBC # BLD AUTO: 3.63 MIL/UL (ref 4–5.2)
SODIUM SERPL-SCNC: 140 MMOL/L (ref 136–145)
WBC NRBC COR # BLD AUTO: 5.6 K/UL (ref 4.3–11)

## 2019-06-18 RX ADMIN — FOLIC ACID SCH MG: 1 TABLET ORAL at 09:27

## 2019-06-18 RX ADMIN — Medication SCH TAB: at 09:29

## 2019-06-18 RX ADMIN — VITAMIN D, TAB 1000IU (100/BT) SCH UNIT: 25 TAB at 09:29

## 2019-06-18 RX ADMIN — MODAFINIL SCH MG: 100 TABLET ORAL at 09:28

## 2019-06-18 RX ADMIN — Medication SCH G: at 21:04

## 2019-06-18 RX ADMIN — Medication SCH MG: at 13:08

## 2019-06-18 RX ADMIN — FERROUS SULFATE TAB 325 MG (65 MG ELEMENTAL FE) SCH MG: 325 (65 FE) TAB at 09:27

## 2019-06-18 RX ADMIN — LACTOBACILLUS TAB SCH EACH: TAB at 09:27

## 2019-06-18 RX ADMIN — OXYCODONE HYDROCHLORIDE AND ACETAMINOPHEN SCH MG: 500 TABLET ORAL at 09:28

## 2019-06-18 RX ADMIN — Medication SCH MG: at 09:28

## 2019-06-18 RX ADMIN — Medication SCH MG: at 17:18

## 2019-06-18 RX ADMIN — LEVETIRACETAM SCH MG: 100 SOLUTION ORAL at 21:04

## 2019-06-18 RX ADMIN — Medication SCH MG: at 21:04

## 2019-06-18 RX ADMIN — PANTOPRAZOLE SODIUM SCH MG: 40 TABLET, DELAYED RELEASE ORAL at 09:28

## 2019-06-18 RX ADMIN — Medication SCH G: at 09:27

## 2019-06-18 RX ADMIN — Medication SCH GM: at 09:29

## 2019-06-18 RX ADMIN — PANTOPRAZOLE SODIUM SCH MG: 40 TABLET, DELAYED RELEASE ORAL at 21:04

## 2019-06-18 RX ADMIN — LACTOBACILLUS TAB SCH EACH: TAB at 17:17

## 2019-06-18 RX ADMIN — Medication SCH G: at 17:18

## 2019-06-18 RX ADMIN — CALCIUM CARBONATE (ANTACID) CHEW TAB 500 MG SCH MG: 500 CHEW TAB at 09:29

## 2019-06-18 RX ADMIN — Medication SCH G: at 13:08

## 2019-06-18 RX ADMIN — DEXTROSE MONOHYDRATE SCH MLS/HR: 50 INJECTION, SOLUTION INTRAVENOUS at 20:47

## 2019-06-18 NOTE — NUR
RN OPENING NOTES



RECEIVED REPORT FROM DAYSHIFT RNRAUL. FOUND Pt AWAKE, RESTING IN BED, WATCHING TV, WITH 
CAREGIVER AT BEDSIDE.NO S/S OF ACUTE DISTRESS OR SOB NOTED. RESPIRATIONS EVEN AND UNLABORED, 
WITH EQUAL CHEST RISE AND FALL. Pt IS A/OX1, FARSI UNDERSTANDING ONLY, NON-VERBAL. IV ACCESS 
ON LAC #24G, IVF NS @25ML/HR (MD AWARE OF RATE, SAID OK). SAFETY MEASURES IN PLACE. BED LOW, 
LOCKED, HOB ELEVATED, SIDE RAILS UP, CALL LIGHT AND BEDSIDE TABLE WITHIN REACH. WILL 
CONTINUE TO MONITOR Pt's CONDITION AND SAFETY THROUGHOUT THE SHIFT.

## 2019-06-18 NOTE — NUR
RN NOTES



ALL NEEDS ATTENDED AND MET, ABLE TO REST AND SLEEP AT INTERVALS, NO SIGNS OF ACUTE 
RESPIRATORY DISTRESS NOTED,SAFETY MEASURES IN PLACE, CAREGIVER AT BEDSIDE, WILL ENDORSE TO 
AM NURSE FOR CONTINUITY OF CARE.

## 2019-06-18 NOTE — NUR
RN NOTES



RECEIVED REPORT FROM IBIS DASH. PATIENT RESTING COMFORTABLY, CAREGIVER AT BEDSIDE, PT HAS NO 
SIGNS OF ACUTE CARDIAC/RESPIRATORY DISTRESS AT THIS TIME, KEEP COMFORTABLE, ALL NEEDS 
ATTENDED, WILL CONTINUE TO MONITOR ACCORDINGLY.

## 2019-06-18 NOTE — NUR
MS RN NOTES

Administered Fleet Enema NC. Patient tolerated well. BM x 1. Noted soft brown. Will continue 
to monitor.

## 2019-06-18 NOTE — NUR
MS RN OPENING NOTES

Received Patient asleep and resting in bed. A/O x 1. VS stable with no acute distress. 
Breathing even and unlabored on 1LPM via NC with no respiratory distress. Patient in 
semi-Fowlers position. No signs and symptoms of pain. 24g PIV on LEFT ARM clean, dry, intact 
and flushing well with IVF NS running at 75ml/hr. Noted LEFT ARM edema, elevated on one 
pillow. Safety precautions in place. Bed locked and set to lowest position with side rails x 
2. All needs rendered at this time. Caregiver at bedside. Will continue to monitor.

## 2019-06-18 NOTE — NUR
MS RN NOTES

Patient seen by Geno BYERS. Obtained order for Fleet Enema PRN. Noted and carried out. Will 
continue to monitor.

## 2019-06-18 NOTE — NUR
MS RN CLOSING NOTES

Patient asleep and resting in bed. A/O x 1. VS stable with no acute distress. Breathing even 
and unlabored on 1LPM via NC with no respiratory distress. Patient in semi-Fowlers position. 
No signs and symptoms of pain. 24g PIV on LEFT ARM clean, dry, intact and flushing well with 
IVF NS running at 25ml/hr. MD aware. Noted LEFT ARM edema, elevated on one pillow. Safety 
precautions in place. Bed locked and set to lowest position with side rails x 2. All needs 
rendered at this time. Caregiver at bedside. Will endorse plan of care to oncoming shift.

## 2019-06-19 VITALS — DIASTOLIC BLOOD PRESSURE: 74 MMHG | SYSTOLIC BLOOD PRESSURE: 116 MMHG

## 2019-06-19 VITALS — SYSTOLIC BLOOD PRESSURE: 104 MMHG | DIASTOLIC BLOOD PRESSURE: 68 MMHG

## 2019-06-19 VITALS — SYSTOLIC BLOOD PRESSURE: 109 MMHG | DIASTOLIC BLOOD PRESSURE: 63 MMHG

## 2019-06-19 VITALS — SYSTOLIC BLOOD PRESSURE: 116 MMHG | DIASTOLIC BLOOD PRESSURE: 74 MMHG

## 2019-06-19 LAB
BUN SERPL-MCNC: 8 MG/DL (ref 7–18)
CALCIUM SERPL-MCNC: 8.1 MG/DL (ref 8.5–10.1)
CHLORIDE SERPL-SCNC: 103 MMOL/L (ref 98–107)
CO2 SERPL-SCNC: 27 MMOL/L (ref 21–32)
CREAT SERPL-MCNC: 0.2 MG/DL (ref 0.6–1.3)
GLUCOSE SERPL-MCNC: 94 MG/DL (ref 74–106)
POTASSIUM SERPL-SCNC: 3.4 MMOL/L (ref 3.5–5.1)
SODIUM SERPL-SCNC: 139 MMOL/L (ref 136–145)

## 2019-06-19 RX ADMIN — PANTOPRAZOLE SODIUM SCH MG: 40 TABLET, DELAYED RELEASE ORAL at 08:53

## 2019-06-19 RX ADMIN — Medication SCH G: at 17:39

## 2019-06-19 RX ADMIN — DEXTROSE MONOHYDRATE SCH MLS/HR: 50 INJECTION, SOLUTION INTRAVENOUS at 21:10

## 2019-06-19 RX ADMIN — OXYCODONE HYDROCHLORIDE AND ACETAMINOPHEN SCH MG: 500 TABLET ORAL at 08:54

## 2019-06-19 RX ADMIN — Medication SCH MG: at 21:44

## 2019-06-19 RX ADMIN — Medication SCH MG: at 17:39

## 2019-06-19 RX ADMIN — Medication SCH MG: at 08:53

## 2019-06-19 RX ADMIN — PANTOPRAZOLE SODIUM SCH MG: 40 TABLET, DELAYED RELEASE ORAL at 21:44

## 2019-06-19 RX ADMIN — LACTOBACILLUS TAB SCH EACH: TAB at 17:39

## 2019-06-19 RX ADMIN — FERROUS SULFATE TAB 325 MG (65 MG ELEMENTAL FE) SCH MG: 325 (65 FE) TAB at 08:53

## 2019-06-19 RX ADMIN — CALCIUM CARBONATE (ANTACID) CHEW TAB 500 MG SCH MG: 500 CHEW TAB at 08:54

## 2019-06-19 RX ADMIN — Medication SCH G: at 21:44

## 2019-06-19 RX ADMIN — LACTOBACILLUS TAB SCH EACH: TAB at 08:53

## 2019-06-19 RX ADMIN — Medication SCH MG: at 12:49

## 2019-06-19 RX ADMIN — MODAFINIL SCH MG: 100 TABLET ORAL at 08:57

## 2019-06-19 RX ADMIN — LEVETIRACETAM SCH MG: 100 SOLUTION ORAL at 21:44

## 2019-06-19 RX ADMIN — FOLIC ACID SCH MG: 1 TABLET ORAL at 08:53

## 2019-06-19 RX ADMIN — Medication SCH G: at 12:48

## 2019-06-19 RX ADMIN — Medication SCH GM: at 08:55

## 2019-06-19 RX ADMIN — Medication SCH G: at 08:57

## 2019-06-19 RX ADMIN — VITAMIN D, TAB 1000IU (100/BT) SCH UNIT: 25 TAB at 08:53

## 2019-06-19 RX ADMIN — Medication SCH TAB: at 08:54

## 2019-06-19 RX ADMIN — SODIUM CHLORIDE PRN MLS/HR: 9 INJECTION, SOLUTION INTRAVENOUS at 01:49

## 2019-06-19 NOTE — NUR
RN NOTES

 ADMINISTERED SCHEDULED MEDICATION, PATIENT STABLE. ASSIST TURN AND REPOSTION Q 2 HR. PRIVET 
CRE GIVER NEXT TO THE BED. CONTINUED MONITORING.

## 2019-06-19 NOTE — NUR
RN NOTES

 PATIENT STABLE, NO ACUTE RESPIRATORY DISTRESS, V/S STABLE. PRIVET CARE GIVER NEXT TO BED 
ASSISTING EAT. MEDICATION ADMINISTERED BY CRUSHED, AND MIXED WITH APPLE SAUCE. ASSIST TURN 
AND REPOSTION Q 2 HR. KEEP LEGS ELEVATED PREVENT EDEMA USING PILLOWS. IV ACCESS ON LEFT FA 
INTACT INFUSING NS AT 25 ML/HR . PATIENT INCONTINENT APPLIED Z-GUARD. NEEDS ATTENDED  AND 
ANTICIPATED, ENDORSED ONCOMING NURSE FOLLOW PLAN OF CARE.

## 2019-06-19 NOTE — NUR
RN NOTES

 RECEIVED PATIENT ON THE BED NONVERBAL,AFASIC. PATIENT OPEN EYES WHEN CALLED  NAME, OR 
TOUCHED. PATIENT HAS NO ACUTE RESPIRATORY DISTRESS, V/S TAKEN STABLE. PRIVET CARE GIVER NEXT 
TO THE BED. KEEP HOB ELEVATED 35 DEGREE FOR ASPIRATION PRECAUTION.  ADMINISTERED SCHEDULED 
MEDICATION WITH CRUSHED, AND MIXED WITH APPLE SAUCE. PATIENT CONTACT ISOLATION OF URINE. 
ASSIST PATIENT TURN AND REPOSTION Q 2 HR. INCONTINENT APPLIED Z-GURD.  CONTINUED MONITORING.

## 2019-06-19 NOTE — NUR
CHANGE OF SHIFT REPORT

Received patient sitting up in bed, eyes closed non verbal. On low flow oxygen at 2L via NC, 
appears comfortable. IVF infusing. Patient pending discharge, per report family's request, 
filed appeal to [facility] following. Will cont to monitor, maintained safety. Caregiver at 
bedside.

## 2019-06-19 NOTE — NUR
RN CLOSING NOTES



NO SIGNIFICANT CHANGES IN Pt's CONDITION. Pt REMAINS STABLE AT THIS TIME. NO S/S OF ACUTE 
DISTRESS OR SOB NOTED DURING THE NIGHT. Pt IS RESTING IN BED, RESPIRATIONS EVEN AND 
UNLABORED, WITH EQUAL CHEST RISE AND FALL. SAFETY MEASURES IN PLACE. BED LOW, LOCKED, HOB 
ELEVATED, SIDE RAILS UP, CALL LIGHT AND BEDSIDE TABLE WITHIN REACH. WILL ENDORSE TO DAYSHIFT 
RN FOR Pt's STACEY.

## 2019-06-20 VITALS — SYSTOLIC BLOOD PRESSURE: 107 MMHG | DIASTOLIC BLOOD PRESSURE: 62 MMHG

## 2019-06-20 VITALS — SYSTOLIC BLOOD PRESSURE: 112 MMHG | DIASTOLIC BLOOD PRESSURE: 63 MMHG

## 2019-06-20 VITALS — DIASTOLIC BLOOD PRESSURE: 68 MMHG | SYSTOLIC BLOOD PRESSURE: 121 MMHG

## 2019-06-20 LAB
BUN SERPL-MCNC: 5 MG/DL (ref 7–18)
CALCIUM SERPL-MCNC: 8.6 MG/DL (ref 8.5–10.1)
CHLORIDE SERPL-SCNC: 106 MMOL/L (ref 98–107)
CO2 SERPL-SCNC: 28 MMOL/L (ref 21–32)
CREAT SERPL-MCNC: 0.3 MG/DL (ref 0.6–1.3)
GLUCOSE SERPL-MCNC: 88 MG/DL (ref 74–106)
POTASSIUM SERPL-SCNC: 3.8 MMOL/L (ref 3.5–5.1)
SODIUM SERPL-SCNC: 141 MMOL/L (ref 136–145)

## 2019-06-20 RX ADMIN — SODIUM CHLORIDE PRN MLS/HR: 9 INJECTION, SOLUTION INTRAVENOUS at 06:12

## 2019-06-20 RX ADMIN — Medication SCH GM: at 09:25

## 2019-06-20 RX ADMIN — Medication SCH G: at 17:12

## 2019-06-20 RX ADMIN — LACTOBACILLUS TAB SCH EACH: TAB at 08:14

## 2019-06-20 RX ADMIN — Medication SCH G: at 08:12

## 2019-06-20 RX ADMIN — LACTOBACILLUS TAB SCH EACH: TAB at 17:04

## 2019-06-20 RX ADMIN — PANTOPRAZOLE SODIUM SCH MG: 40 TABLET, DELAYED RELEASE ORAL at 08:13

## 2019-06-20 RX ADMIN — PANTOPRAZOLE SODIUM SCH MG: 40 TABLET, DELAYED RELEASE ORAL at 21:35

## 2019-06-20 RX ADMIN — Medication SCH G: at 12:09

## 2019-06-20 RX ADMIN — Medication SCH MG: at 17:03

## 2019-06-20 RX ADMIN — VITAMIN D, TAB 1000IU (100/BT) SCH UNIT: 25 TAB at 08:14

## 2019-06-20 RX ADMIN — FERROUS SULFATE TAB 325 MG (65 MG ELEMENTAL FE) SCH MG: 325 (65 FE) TAB at 08:13

## 2019-06-20 RX ADMIN — SODIUM CHLORIDE PRN MLS/HR: 9 INJECTION, SOLUTION INTRAVENOUS at 21:36

## 2019-06-20 RX ADMIN — Medication SCH G: at 13:31

## 2019-06-20 RX ADMIN — Medication SCH MG: at 21:35

## 2019-06-20 RX ADMIN — CALCIUM CARBONATE (ANTACID) CHEW TAB 500 MG SCH MG: 500 CHEW TAB at 08:15

## 2019-06-20 RX ADMIN — Medication SCH MG: at 08:14

## 2019-06-20 RX ADMIN — LEVETIRACETAM SCH MG: 100 SOLUTION ORAL at 21:34

## 2019-06-20 RX ADMIN — Medication SCH MG: at 12:09

## 2019-06-20 RX ADMIN — OXYCODONE HYDROCHLORIDE AND ACETAMINOPHEN SCH MG: 500 TABLET ORAL at 08:15

## 2019-06-20 RX ADMIN — FOLIC ACID SCH MG: 1 TABLET ORAL at 08:15

## 2019-06-20 RX ADMIN — Medication SCH G: at 21:35

## 2019-06-20 RX ADMIN — Medication SCH TAB: at 08:13

## 2019-06-20 RX ADMIN — Medication SCH MG: at 13:31

## 2019-06-20 RX ADMIN — MODAFINIL SCH MG: 100 TABLET ORAL at 08:13

## 2019-06-20 NOTE — NUR
MS RN NOTES:

RECEIVED PATIENT IN BED, ASLEEP. AROUSABLE TO VERBAL AND TACTILE STIMULI. OPENS EYES WHEN 
CALLED BY NAME. PRIVATE CAREGIVER AT BEDSIDE. ATE BREAKFAST FED BY CAREGIVER TOLERATED WELL. 
RFA #[id number] INTACT AND PATENT. DENIES ANY C/O PAIN NOR DISCOMFORT AT THIS TIME. CALL LIGHT 
WITHIN REACH, NEEDS ATTENDED. REMAINS STABLE AT THIS TIME.

## 2019-06-20 NOTE — NUR
MS RN PM OPENING NOTE 

BEDSIDE REPORT RECIEVED FROM TARA BAUMANN. PATIENT IN BED, ALERT AND AWAKE. CAREGIVER AT THE 
BEDSIDE, IRENE. REVIEWED POC QUESTIONS CONCERNS ADDRESSED.  ON GOING NS IVF INFUSING TO 
RFA #[id number] INTACT & PATENT W/O S/S OF COMPLICATIONS. CONTACT PRECAUTIONS BEING OBSERVED AT AL 
TIMES FOR ESBL IN URINE.

## 2019-06-20 NOTE — NUR
END OF SHIFT OF REPORT

Patient in bed, non verbal able to open eyes. On low flow oxygen at 1L via NC tolerating 
well. IVF infusing maintained at 75 ml/hr, on IV abx as scheduled with no adverse side 
effect, voiding well. No BM this shift, no active bleeding, H/H stable. Aspiration 
precaution. Contact isolation, PPE utilized. Maintained safety.

## 2019-06-20 NOTE — NUR
IV INFUSION DOCUMENTATION ERROR. 



INCORRECTLY DOCUMENTED INFUSING TO LEFT ARM. INFUSION IS INFUSING TO RIGHT ARM 22 GAUGE WITH 
NO S/S OF COMPLICATION.

## 2019-06-20 NOTE — NUR
MS RN NOTES

PATIENT IN BED, ALERT AND AWAKE. OPENS EYES WHEN CALLED. CAREGIVER AT BEDSIDE. ATE ALL MEALS 
WITH GOOD PO INTAKE. ALL DUE MEDS VIA PO SERGIO WELL W/O ASE NOTED. DENIES ANY C/O PAIN NOR 
DISCOMFORT. S/P IV ATB AS ORDERED FOR ESBL UTI W/O ASE NOTED. ON GOING IVF AS ORDERED SERGIO 
WELL. RFA #[id number] INTACT & PATENT W/O S/S OF COMPLICATIONS. CONTACT PRECAUTIONS OBSERVED AT AL 
TIMES. GOOD HANDWASHING TECHNIQUE OBSERVED. REMAINS STABLE AT THIS TIME.

## 2019-06-21 VITALS — DIASTOLIC BLOOD PRESSURE: 64 MMHG | SYSTOLIC BLOOD PRESSURE: 108 MMHG

## 2019-06-21 VITALS — SYSTOLIC BLOOD PRESSURE: 134 MMHG | DIASTOLIC BLOOD PRESSURE: 73 MMHG

## 2019-06-21 VITALS — DIASTOLIC BLOOD PRESSURE: 78 MMHG | SYSTOLIC BLOOD PRESSURE: 126 MMHG

## 2019-06-21 LAB
BUN SERPL-MCNC: 7 MG/DL (ref 7–18)
CALCIUM SERPL-MCNC: 8.1 MG/DL (ref 8.5–10.1)
CHLORIDE SERPL-SCNC: 106 MMOL/L (ref 98–107)
CO2 SERPL-SCNC: 28 MMOL/L (ref 21–32)
CREAT SERPL-MCNC: 0.3 MG/DL (ref 0.6–1.3)
GLUCOSE SERPL-MCNC: 86 MG/DL (ref 74–106)
POTASSIUM SERPL-SCNC: 3.2 MMOL/L (ref 3.5–5.1)
SODIUM SERPL-SCNC: 142 MMOL/L (ref 136–145)

## 2019-06-21 RX ADMIN — Medication SCH MG: at 21:05

## 2019-06-21 RX ADMIN — Medication SCH MG: at 17:23

## 2019-06-21 RX ADMIN — POTASSIUM CHLORIDE SCH MLS/HR: 200 INJECTION, SOLUTION INTRAVENOUS at 10:38

## 2019-06-21 RX ADMIN — Medication SCH G: at 12:18

## 2019-06-21 RX ADMIN — CALCIUM CARBONATE (ANTACID) CHEW TAB 500 MG SCH MG: 500 CHEW TAB at 08:03

## 2019-06-21 RX ADMIN — Medication SCH MG: at 08:02

## 2019-06-21 RX ADMIN — Medication SCH GM: at 08:21

## 2019-06-21 RX ADMIN — Medication SCH TAB: at 08:04

## 2019-06-21 RX ADMIN — POTASSIUM CHLORIDE SCH MLS/HR: 200 INJECTION, SOLUTION INTRAVENOUS at 09:34

## 2019-06-21 RX ADMIN — FOLIC ACID SCH MG: 1 TABLET ORAL at 08:03

## 2019-06-21 RX ADMIN — POTASSIUM CHLORIDE SCH MLS/HR: 200 INJECTION, SOLUTION INTRAVENOUS at 13:00

## 2019-06-21 RX ADMIN — Medication SCH G: at 08:02

## 2019-06-21 RX ADMIN — FERROUS SULFATE TAB 325 MG (65 MG ELEMENTAL FE) SCH MG: 325 (65 FE) TAB at 08:03

## 2019-06-21 RX ADMIN — LEVETIRACETAM SCH MG: 100 SOLUTION ORAL at 21:05

## 2019-06-21 RX ADMIN — Medication SCH MG: at 12:18

## 2019-06-21 RX ADMIN — LACTOBACILLUS TAB SCH EACH: TAB at 08:03

## 2019-06-21 RX ADMIN — POTASSIUM CHLORIDE SCH MLS/HR: 200 INJECTION, SOLUTION INTRAVENOUS at 11:41

## 2019-06-21 RX ADMIN — OXYCODONE HYDROCHLORIDE AND ACETAMINOPHEN SCH MG: 500 TABLET ORAL at 08:04

## 2019-06-21 RX ADMIN — LACTOBACILLUS TAB SCH EACH: TAB at 17:23

## 2019-06-21 RX ADMIN — VITAMIN D, TAB 1000IU (100/BT) SCH UNIT: 25 TAB at 08:03

## 2019-06-21 RX ADMIN — Medication SCH G: at 21:05

## 2019-06-21 RX ADMIN — SODIUM CHLORIDE PRN MLS/HR: 9 INJECTION, SOLUTION INTRAVENOUS at 17:33

## 2019-06-21 RX ADMIN — MODAFINIL SCH MG: 100 TABLET ORAL at 08:03

## 2019-06-21 RX ADMIN — PANTOPRAZOLE SODIUM SCH MG: 40 TABLET, DELAYED RELEASE ORAL at 21:05

## 2019-06-21 RX ADMIN — PANTOPRAZOLE SODIUM SCH MG: 40 TABLET, DELAYED RELEASE ORAL at 08:04

## 2019-06-21 RX ADMIN — Medication SCH G: at 17:23

## 2019-06-21 NOTE — NUR
MS RN NOTES



SEEN AND EVALUATED BY SK EARLIER THIS MORNING, WITH NO NEW ORDERS NOTED. WILL CONTINUE TO 
MONITOR. MD/SK AWARE WILL REPLACE POTASSIUM ELECTROLYTES.

## 2019-06-21 NOTE — NUR
MS RN OPENING NOTES:



RECEIVED PATIENT AWAKE ALERT, NON- VERBAL, ON O2 NC, DAUGHTER AT THE BEDSIDE, REQUESTING FOR 
THE DIAPER CHANGE OF THE PATIENT, ADVISED DAUGHTER THAT CNA WILL BE THERE ASAP, VERBALIZED 
UNDERSTANDING. ADVISED DAUGHTER TO INFORM RN  BEFORE SHE LEAVES, SO THE BED WILL LOWER DOWN 
AND BED ALARM WILL TURN ON AND SHE REPLIED" YES AND THE CAREGIVER IS COMING". PATIENT IS 
COMFORTABLE. NO SOB. DAUGHTER IS TALKING TO THE PATIENT. AND PATIENT IS RESPONDING BY FACIAL 
EXPRESSIONS.

## 2019-06-21 NOTE — NUR
MS RN OPENING NOTES



RECEIVED PT IN BED RESTING.ON SUPPLEMENTARY OXYGEN AT 1L VIA NC, WITH NO ACUTE RESPIRATORY 
DISTRESS. PT NON VERBAL, ONLY OPENS EYES. PT EXHIBITED NO SIGNS OF PAIN OR DISCOMFORT.   PT 
HAS CAREGIVER 24HOURS BEDSIDE, DENIES ANY CONCERNS AND QUESTIONS AT THIS TIME. IVF NS AT 
75ML/HR TO RFA G22, INTACT AND FLUID INFUSING WELL. PT KEPT COMFORTABLE. CALL LIGHT AND 
FLUID KEPT WITHIN REACH. HOB ELEVATED. PT'S BED IN LOWEST, LOCKED POSITION WITH SR X3. WILL 
CONTINUE PLAN OF CARE.

## 2019-06-21 NOTE — NUR
MS RN CLOSING NOTES



PT REMAINS IN BED RESTING. ON SUPPLEMENTARY OXYGEN AT 1L VIA NC, WITH NO ACUTE RESPIRATORY 
DISTRESS. PT NON VERBAL, ONLY OPENS EYES. PT EXHIBITED NO SIGNS OF PAIN OR DISCOMFORT. 
DAUGHTER PRESENT BEDSIDE, DENIES ANY CONCERNS AND QUESTIONS AT THIS TIME. IVF NS AT 75ML/HR 
TO RFA G22, INTACT AND FLUID INFUSING WELL.  ALL NEEDS AND CARE PROVIDED. PT KEPT 
COMFORTABLE. CALL LIGHT AND FLUID KEPT WITHIN REACH. HOB ELEVATED. PT'S BED IN LOWEST, 
LOCKED POSITION WITH SR X3. ENDORSED TO INCOMING NIGHT NURSE FOR STACEY.

## 2019-06-21 NOTE — NUR
RN PM CLOSING NOTES.



PATIENT SEEN WITH EYES CLOSED. CAREGIVER RESTING ON COT AT THE BEDSIDE ALSO SEEN WITH EYES 
CLOSED. PATIENT IN NO APPARENT DISTRESS ON ONE LITER NC. MEDS ADMINISTERED AS ORDERED. IV 
INFUSING TO RIGHT FOREARM WILL ENDORSE REPORT TO DAY NURSE FOR CONTINUITY OF CARE.

## 2019-06-22 VITALS — SYSTOLIC BLOOD PRESSURE: 106 MMHG | DIASTOLIC BLOOD PRESSURE: 58 MMHG

## 2019-06-22 VITALS — DIASTOLIC BLOOD PRESSURE: 58 MMHG | SYSTOLIC BLOOD PRESSURE: 106 MMHG

## 2019-06-22 VITALS — DIASTOLIC BLOOD PRESSURE: 58 MMHG | SYSTOLIC BLOOD PRESSURE: 99 MMHG

## 2019-06-22 VITALS — SYSTOLIC BLOOD PRESSURE: 126 MMHG | DIASTOLIC BLOOD PRESSURE: 68 MMHG

## 2019-06-22 LAB
BUN SERPL-MCNC: 6 MG/DL (ref 7–18)
CALCIUM SERPL-MCNC: 8.3 MG/DL (ref 8.5–10.1)
CHLORIDE SERPL-SCNC: 103 MMOL/L (ref 98–107)
CO2 SERPL-SCNC: 28 MMOL/L (ref 21–32)
CREAT SERPL-MCNC: 0.3 MG/DL (ref 0.6–1.3)
GLUCOSE SERPL-MCNC: 95 MG/DL (ref 74–106)
MAGNESIUM SERPL-MCNC: 1.8 MG/DL (ref 1.8–2.4)
POTASSIUM SERPL-SCNC: 3.8 MMOL/L (ref 3.5–5.1)
SODIUM SERPL-SCNC: 138 MMOL/L (ref 136–145)

## 2019-06-22 RX ADMIN — Medication SCH ML: at 17:47

## 2019-06-22 RX ADMIN — Medication SCH MG: at 21:27

## 2019-06-22 RX ADMIN — Medication SCH TAB: at 08:06

## 2019-06-22 RX ADMIN — OXYCODONE HYDROCHLORIDE AND ACETAMINOPHEN SCH MG: 500 TABLET ORAL at 08:06

## 2019-06-22 RX ADMIN — FOLIC ACID SCH MG: 1 TABLET ORAL at 08:05

## 2019-06-22 RX ADMIN — MODAFINIL SCH MG: 100 TABLET ORAL at 08:06

## 2019-06-22 RX ADMIN — SODIUM CHLORIDE PRN MLS/HR: 9 INJECTION, SOLUTION INTRAVENOUS at 23:09

## 2019-06-22 RX ADMIN — FERROUS SULFATE TAB 325 MG (65 MG ELEMENTAL FE) SCH MG: 325 (65 FE) TAB at 08:05

## 2019-06-22 RX ADMIN — Medication SCH GM: at 08:49

## 2019-06-22 RX ADMIN — Medication SCH G: at 12:00

## 2019-06-22 RX ADMIN — Medication SCH ML: at 13:34

## 2019-06-22 RX ADMIN — VITAMIN D, TAB 1000IU (100/BT) SCH UNIT: 25 TAB at 08:06

## 2019-06-22 RX ADMIN — CALCIUM CARBONATE (ANTACID) CHEW TAB 500 MG SCH MG: 500 CHEW TAB at 08:06

## 2019-06-22 RX ADMIN — Medication SCH G: at 08:06

## 2019-06-22 RX ADMIN — LEVETIRACETAM SCH MG: 100 SOLUTION ORAL at 21:27

## 2019-06-22 RX ADMIN — PANTOPRAZOLE SODIUM SCH MG: 40 TABLET, DELAYED RELEASE ORAL at 21:27

## 2019-06-22 RX ADMIN — PANTOPRAZOLE SODIUM SCH MG: 40 TABLET, DELAYED RELEASE ORAL at 08:06

## 2019-06-22 RX ADMIN — Medication SCH G: at 16:59

## 2019-06-22 RX ADMIN — SODIUM CHLORIDE PRN MLS/HR: 9 INJECTION, SOLUTION INTRAVENOUS at 08:04

## 2019-06-22 RX ADMIN — Medication SCH MG: at 08:05

## 2019-06-22 RX ADMIN — LACTOBACILLUS TAB SCH EACH: TAB at 16:59

## 2019-06-22 RX ADMIN — Medication SCH MG: at 12:01

## 2019-06-22 RX ADMIN — Medication SCH G: at 21:27

## 2019-06-22 RX ADMIN — LACTOBACILLUS TAB SCH EACH: TAB at 08:06

## 2019-06-22 RX ADMIN — Medication SCH MG: at 17:00

## 2019-06-22 NOTE — NUR
MS RN NOTES





SPOKE TO THE DAUGHTER VIA PHONE REGARDING CONCERNS ON MEDICINES, REPEAT UA, OXYGEN, PT'S NOT 
TOLERATING RA. MD/SK IN THE UNIT MADE AWARE AND ORDERED CXRAY. WILL CONTINUE TO MONITOR PT.

## 2019-06-22 NOTE — NUR
RN NOTES





SPOKE TO NP/JULISSA ORDERED TO PLACE ORDER FOR AMIKACIN FOR PHARMACY TO DOSE. INCOMING NIGHT 
NURSE/ASHLEY MADE AWARE.

## 2019-06-22 NOTE — NUR
MS RN NOTES







PT'S AUGHTER PRESENT WHILE PT BEING CHANGED AND CLEANED BY CNA. DAUGHTER REPORTED DISCHARGE 
ON PERINEAL AREA. MD/SK MADE AWARE. DAUGHTER CONSENTED TO TAKE A PICTURE TO SEND TO MD/SK. 
MD/SK MADE AWARE AND ADVISE TO TELL ID NP NERA. AWAITING FOR RESPONSE FROM ID/NP NERA. WILL 
ENDORSE TO INCOMING NIGHT NURSE AS WELL.

## 2019-06-22 NOTE — NUR
MS RN CLOSING NOTES





PT IN BED ASLEEP, EASILY AROUSED. A/O X1, NON VERBAL, OPENS EYES . DAUGHTER PRESENT AT 
BEDSIDE. ON SUPPLEMENTARY OXYGEN AT 1L, WITH NO ACUTE RESPIRATORY DISTRESS. PT DENIES PAIN 
AND DISCOMFORT AT THIS TIME. IVF NS AT 75 ML/HR TO RFA G22, INTACT AND FLUID INFUSING WELL. 
ALL NEEDS AND CARE PROVIDED. PT KEPT COMFORTABLE. HOB ELEVATED. PT'S BED IN LOWEST, LOCKED 
POSITION WITH SR X3. CALL LIGHT KEPT WITHIN REACH. WILL ENDORSE TO NIGHT SHIFT NURSE.

## 2019-06-22 NOTE — NUR
RN OPEN NOTES



RECEIVED PATIENT AWAKE IN BED WITH DAUGHTER AT BEDSIDE. PT NON-VERBAL WITH TRACKING. NO 
SIGNS OF DISTRESS OR DISCOMFORT. BREATHING EVEN AND UNLABORED. ON 1LPM 02 VIA NC. IV ACCESS 
IN R HAND WITH NS INFUSING, PATENT AND INTACT, NO SIGNS OF REDNESS OR INFILTRATION. BED IN 
LOW LOCKED POSITION WITH SIDE RAILS X2. CALL LIGHT WITHIN REACH. WILL CONTINUE TO MONITOR.

## 2019-06-22 NOTE — NUR
MS RN OPENING NOTES



RECEIVED PT IN BED ASLEEP, EASILY AROUSED. A/O X1, NON VERBAL, OPENS EYES . PT HAS CAREGIVER 
AT BEDSIDE. ON SUPPLEMENTARY OXYGEN AT 1L, WITH NO ACUTE RESPIRATORY DISTRESS. PT DENIES 
PAIN AND DISCOMFORT AT THIS TIME. IVF NS AT 75 ML/HR TO RFA G22, INTACT AND FLUID INFUSING 
WELL. PT KEPT COMFORTABLE. HOB ELEVATED. PT'S BED IN LOWEST, LOCKED POSITION WITH SR X3. 
CALL LIGHT KEPT WITHIN REACH. WILL CONTINUE PLAN OF CARE.

## 2019-06-22 NOTE — NUR
MS RN CLOSING NOTES:



PATIENT IS RESTING COMFORTABLY IN BED, WITH O2 AT 2L/MIN. NO SOB NOTED. CARE GIVER AT THE 
BEDSIDE. NO C/O PAIN. HEMODYNAMICS AND RESPIRATORY STATUS ARE STABLE. BED ALARM ON. KEPT 
PATIENT CLEAN AND DRY. CONTACT ISOLATION FOR ESBL OF THE URINE MAINTAINED AT ALL TIMES. SIGN 
ON THE DOOR TO ALERT FAMILY OR VISITORS FOR CONTACT ISOLATION. BED IN LOW AND LOCKED 
POSITION. NOTES FROM ST POSTED ON THE WALL IN THE PATIENT'S HEAD PART, RECOMMENDATIONS FROM 
ST FOLLOWED AT ALL TIMES ESPECIALLY WHEN GIVING PO MEDS AND ALL PO FLUIDS. CARE GIVER 
REMINDED,VERBALIZED UNDERSTANDING.

## 2019-06-23 VITALS — SYSTOLIC BLOOD PRESSURE: 104 MMHG | DIASTOLIC BLOOD PRESSURE: 51 MMHG

## 2019-06-23 VITALS — DIASTOLIC BLOOD PRESSURE: 59 MMHG | SYSTOLIC BLOOD PRESSURE: 123 MMHG

## 2019-06-23 VITALS — DIASTOLIC BLOOD PRESSURE: 51 MMHG | SYSTOLIC BLOOD PRESSURE: 104 MMHG

## 2019-06-23 VITALS — DIASTOLIC BLOOD PRESSURE: 67 MMHG | SYSTOLIC BLOOD PRESSURE: 136 MMHG

## 2019-06-23 RX ADMIN — FOLIC ACID SCH MG: 1 TABLET ORAL at 08:08

## 2019-06-23 RX ADMIN — Medication SCH GM: at 08:20

## 2019-06-23 RX ADMIN — Medication SCH TAB: at 08:08

## 2019-06-23 RX ADMIN — Medication SCH G: at 08:08

## 2019-06-23 RX ADMIN — SODIUM CHLORIDE PRN MLS/HR: 9 INJECTION, SOLUTION INTRAVENOUS at 14:10

## 2019-06-23 RX ADMIN — Medication SCH ML: at 17:24

## 2019-06-23 RX ADMIN — Medication SCH G: at 17:09

## 2019-06-23 RX ADMIN — Medication SCH MG: at 21:27

## 2019-06-23 RX ADMIN — Medication SCH MG: at 17:09

## 2019-06-23 RX ADMIN — Medication SCH MG: at 12:31

## 2019-06-23 RX ADMIN — LEVETIRACETAM SCH MG: 100 SOLUTION ORAL at 21:27

## 2019-06-23 RX ADMIN — VITAMIN D, TAB 1000IU (100/BT) SCH UNIT: 25 TAB at 08:08

## 2019-06-23 RX ADMIN — Medication SCH ML: at 08:07

## 2019-06-23 RX ADMIN — LACTOBACILLUS TAB SCH EACH: TAB at 17:09

## 2019-06-23 RX ADMIN — Medication SCH MG: at 08:08

## 2019-06-23 RX ADMIN — LACTOBACILLUS TAB SCH EACH: TAB at 08:08

## 2019-06-23 RX ADMIN — CALCIUM CARBONATE (ANTACID) CHEW TAB 500 MG SCH MG: 500 CHEW TAB at 08:08

## 2019-06-23 RX ADMIN — MODAFINIL SCH MG: 100 TABLET ORAL at 08:08

## 2019-06-23 RX ADMIN — Medication SCH G: at 12:31

## 2019-06-23 RX ADMIN — Medication SCH G: at 21:27

## 2019-06-23 RX ADMIN — FERROUS SULFATE TAB 325 MG (65 MG ELEMENTAL FE) SCH MG: 325 (65 FE) TAB at 08:08

## 2019-06-23 RX ADMIN — PANTOPRAZOLE SODIUM SCH MG: 40 TABLET, DELAYED RELEASE ORAL at 08:08

## 2019-06-23 RX ADMIN — PANTOPRAZOLE SODIUM SCH MG: 40 TABLET, DELAYED RELEASE ORAL at 21:27

## 2019-06-23 RX ADMIN — OXYCODONE HYDROCHLORIDE AND ACETAMINOPHEN SCH MG: 500 TABLET ORAL at 08:08

## 2019-06-23 NOTE — NUR
RN CLOSING NOTES



PATIENT RESTING IN BED WITH CAREGIVER AT BEDSIDE. PT NON-VERBAL WITH TRACKING. NO SIGNS OF 
DISTRESS OR DISCOMFORT. BREATHING EVEN AND UNLABORED. ON 1LPM 02 VIA NC. IV ACCESS IN R HAND 
WITH NS INFUSING, PATENT AND INTACT, NO SIGNS OF REDNESS OR INFILTRATION. ALL NEEDS MET. NO 
SIGNIFICANT CHANGES THROUGH THE NIGHT. PATIENT REPOSITIONED Q2H. BED IN LOW LOCKED POSITION 
WITH SIDE RAILS X2. CALL LIGHT WITHIN REACH. WILL ENDORSE TO AM SHIFT FOR STACEY.

## 2019-06-23 NOTE — NUR
MS RN NOTES





DAUGHTER/KUSUM PRESENT BEDSIDE AWARE OF PT'S CONDITION. DAUGHTER SPOKE TO CM/SHARON 
REGARDING DISCHARGE PLANNING AND PREFERENCE FOR ID MD/DR DOWNS. DR GUERRERO MADE AWARE, 
AWAITING FOR RESPONSE.

## 2019-06-23 NOTE — NUR
MS RN CLOSING NOTES



PT REMAINS IN BED AWAKE, A/O X1, NON VERBAL, OPENS EYES AND TRACKING. DAUGHTER PRESENT AT 
BEDSIDE. ON SUPPLEMENTARY OXYGEN AT 1L, WITH NO ACUTE RESPIRATORY DISTRESS. PT EXHIBITED NO 
SIGNS OF PAIN AND DISCOMFORT AT THIS TIME. IVF NS AT 75 ML/HR TO RFA G22, INTACT AND FLUID 
INFUSING WELL. ALL NEEDS AND CARE PROVIDED. PT KEPT COMFORTABLE. HOB ELEVATED. PT'S BED IN 
LOWEST, LOCKED POSITION WITH SR X3. CALL LIGHT KEPT WITHIN REACH. WILL ENDORSE TO NIGHT 
SHIFT NURSE.

## 2019-06-23 NOTE — NUR
MS RN OPENING NOTES





RECEIVED PT RESTING IN BED, EASILY AROUSED. A/O X1. CAREGIVER PRESENT BEDSIDE. ON 
SUPPLEMENTARY O2 AT 1L VIA NC, WITH NO ACUTE RESPIRATORY DISTRESS NOTED. PT DENIES ANY PAIN 
OR DISCOMFORT AT THIS TIME. IVF NS AT 75ML/HR TO RIGHT HAND G22, INTACT AND FLUID INFUSING 
WELL. PT KEPT COMFORTABLE. HOB ELEVATED.  PT'S BED KEPT IN LOWEST, LOCKED POSITION WITH SR 
X3. CALL LIGHT KEPT WITHIN REACH. WILL CONTINUE PLAN OF CARE.

## 2019-06-23 NOTE — NUR
MS RN NOTES 



PATIENT ASLEEP IN BED WITH NO DISTRESS NOTED. HOB ELEVATED AT 90 DEGREES FOR ASPIRATION 
PRECAUTION. CALL LIGHT WITHIN REACH. DTR AT BEDSIDE. PERIPHERAL LINE INTACT AND PATENT. NO 
FACIAL GRIMACING OR GROANING TO INDICATE PAIN OR DISCOMFORT. BED IN LOW LOCK SETTING. ALL 
BELONGINGS KEPT NEAR BEDSIDE. WILL CONTINUE TO MONITOR.

## 2019-06-24 VITALS — SYSTOLIC BLOOD PRESSURE: 105 MMHG | DIASTOLIC BLOOD PRESSURE: 60 MMHG

## 2019-06-24 LAB
BUN SERPL-MCNC: 5 MG/DL (ref 7–18)
CALCIUM SERPL-MCNC: 8.4 MG/DL (ref 8.5–10.1)
CHLORIDE SERPL-SCNC: 102 MMOL/L (ref 98–107)
CO2 SERPL-SCNC: 28 MMOL/L (ref 21–32)
CREAT SERPL-MCNC: 0.4 MG/DL (ref 0.6–1.3)
GLUCOSE SERPL-MCNC: 132 MG/DL (ref 74–106)
POTASSIUM SERPL-SCNC: 3.2 MMOL/L (ref 3.5–5.1)
SODIUM SERPL-SCNC: 139 MMOL/L (ref 136–145)

## 2019-06-24 RX ADMIN — Medication SCH MG: at 08:27

## 2019-06-24 RX ADMIN — POTASSIUM CHLORIDE SCH MEQ: 1.5 POWDER, FOR SOLUTION ORAL at 12:33

## 2019-06-24 RX ADMIN — Medication SCH MG: at 12:22

## 2019-06-24 RX ADMIN — Medication SCH G: at 08:27

## 2019-06-24 RX ADMIN — Medication SCH TAB: at 08:27

## 2019-06-24 RX ADMIN — FOLIC ACID SCH MG: 1 TABLET ORAL at 08:27

## 2019-06-24 RX ADMIN — FERROUS SULFATE TAB 325 MG (65 MG ELEMENTAL FE) SCH MG: 325 (65 FE) TAB at 08:27

## 2019-06-24 RX ADMIN — CALCIUM CARBONATE (ANTACID) CHEW TAB 500 MG SCH MG: 500 CHEW TAB at 08:27

## 2019-06-24 RX ADMIN — VITAMIN D, TAB 1000IU (100/BT) SCH UNIT: 25 TAB at 08:27

## 2019-06-24 RX ADMIN — PANTOPRAZOLE SODIUM SCH MG: 40 TABLET, DELAYED RELEASE ORAL at 08:27

## 2019-06-24 RX ADMIN — MODAFINIL SCH MG: 100 TABLET ORAL at 08:47

## 2019-06-24 RX ADMIN — LACTOBACILLUS TAB SCH EACH: TAB at 08:27

## 2019-06-24 RX ADMIN — Medication SCH ML: at 08:26

## 2019-06-24 RX ADMIN — POTASSIUM CHLORIDE SCH MEQ: 1.5 POWDER, FOR SOLUTION ORAL at 12:21

## 2019-06-24 RX ADMIN — SODIUM CHLORIDE PRN MLS/HR: 9 INJECTION, SOLUTION INTRAVENOUS at 04:12

## 2019-06-24 RX ADMIN — OXYCODONE HYDROCHLORIDE AND ACETAMINOPHEN SCH MG: 500 TABLET ORAL at 08:27

## 2019-06-24 RX ADMIN — Medication SCH G: at 12:22

## 2019-06-24 RX ADMIN — Medication SCH GM: at 08:48

## 2019-06-24 NOTE — NUR
MS RN NOTES



PATIENT ASLEEP IN BED WITH NO DISTRESS NOTED. CALL LIGHT WITHIN REACH. CAREGIVER AT BEDSIDE. 
ALL DUE MEDS GIVEN AS ORDERED WITH NO ASE NOTED. WOUND CARE RENDERED AND TOLERATED WELL. NO 
FACIAL GRIMACING OR GROANING TO INDICATE PAIN OR DISCOMFORT. PERIPHERAL LINE INTACT AND 
PATENT. BED IN LOW LOCK SETTING. ALL BELONGINGS KEPT NEAR BEDSIDE. WILL CONTINUE TO MONITOR.

## 2019-06-24 NOTE — NUR
RN DISCHARGE NOTES

PT STABLE AT DISCHARGE. [facility] TO 
[facility]. ALL PATIENT BELONGINGS TAKEN WITH PATIENT. CAREGIVER AT BEDSIDE, HELPED ASSIST IN TAKING 
BELONGINGS. ID AND IV REMOVED AT DISCHARGE. PT LEFT UNIT AT 1330 [facility]IA [facility]. PT 
LEFT VIA GURNEY.

## 2019-07-23 ENCOUNTER — HOSPITAL ENCOUNTER (INPATIENT)
Dept: HOSPITAL 54 - ER | Age: 79
LOS: 13 days | Discharge: NURSING, REHAB OR LONG-TERM CARE | DRG: 177 | End: 2019-08-05
Attending: NURSE PRACTITIONER | Admitting: NURSE PRACTITIONER
Payer: MEDICARE

## 2019-07-23 VITALS — BODY MASS INDEX: 28 KG/M2 | HEIGHT: 55 IN | WEIGHT: 121 LBS

## 2019-07-23 DIAGNOSIS — B96.20: ICD-10-CM

## 2019-07-23 DIAGNOSIS — D50.9: ICD-10-CM

## 2019-07-23 DIAGNOSIS — F09: ICD-10-CM

## 2019-07-23 DIAGNOSIS — G40.909: ICD-10-CM

## 2019-07-23 DIAGNOSIS — E44.0: ICD-10-CM

## 2019-07-23 DIAGNOSIS — L30.4: ICD-10-CM

## 2019-07-23 DIAGNOSIS — J96.01: ICD-10-CM

## 2019-07-23 DIAGNOSIS — J98.11: ICD-10-CM

## 2019-07-23 DIAGNOSIS — E88.09: ICD-10-CM

## 2019-07-23 DIAGNOSIS — K57.90: ICD-10-CM

## 2019-07-23 DIAGNOSIS — K44.9: ICD-10-CM

## 2019-07-23 DIAGNOSIS — R13.10: ICD-10-CM

## 2019-07-23 DIAGNOSIS — R30.0: ICD-10-CM

## 2019-07-23 DIAGNOSIS — L89.010: ICD-10-CM

## 2019-07-23 DIAGNOSIS — K21.9: ICD-10-CM

## 2019-07-23 DIAGNOSIS — N39.0: ICD-10-CM

## 2019-07-23 DIAGNOSIS — I11.0: ICD-10-CM

## 2019-07-23 DIAGNOSIS — Z88.2: ICD-10-CM

## 2019-07-23 DIAGNOSIS — E87.6: ICD-10-CM

## 2019-07-23 DIAGNOSIS — E87.1: ICD-10-CM

## 2019-07-23 DIAGNOSIS — J69.0: Primary | ICD-10-CM

## 2019-07-23 DIAGNOSIS — I50.33: ICD-10-CM

## 2019-07-23 DIAGNOSIS — F02.80: ICD-10-CM

## 2019-07-23 DIAGNOSIS — E66.9: ICD-10-CM

## 2019-07-23 DIAGNOSIS — G30.9: ICD-10-CM

## 2019-07-23 DIAGNOSIS — Z87.440: ICD-10-CM

## 2019-07-23 LAB
ALBUMIN SERPL BCP-MCNC: 3 G/DL (ref 3.4–5)
ALP SERPL-CCNC: 98 U/L (ref 46–116)
ALT SERPL W P-5'-P-CCNC: 23 U/L (ref 12–78)
AST SERPL W P-5'-P-CCNC: 16 U/L (ref 15–37)
BASOPHILS # BLD AUTO: 0.1 /CMM (ref 0–0.2)
BASOPHILS NFR BLD AUTO: 1 % (ref 0–2)
BILIRUB DIRECT SERPL-MCNC: 0.1 MG/DL (ref 0–0.2)
BILIRUB SERPL-MCNC: 0.3 MG/DL (ref 0.2–1)
BUN SERPL-MCNC: 11 MG/DL (ref 7–18)
CALCIUM SERPL-MCNC: 8.5 MG/DL (ref 8.5–10.1)
CHLORIDE SERPL-SCNC: 99 MMOL/L (ref 98–107)
CO2 SERPL-SCNC: 25 MMOL/L (ref 21–32)
CREAT SERPL-MCNC: 0.5 MG/DL (ref 0.6–1.3)
EOSINOPHIL NFR BLD AUTO: 1.2 % (ref 0–6)
GLUCOSE SERPL-MCNC: 136 MG/DL (ref 74–106)
HCT VFR BLD AUTO: 30 % (ref 33–45)
HGB BLD-MCNC: 9.7 G/DL (ref 11.5–14.8)
LYMPHOCYTES NFR BLD AUTO: 1 /CMM (ref 0.8–4.8)
LYMPHOCYTES NFR BLD AUTO: 9.8 % (ref 20–44)
MCHC RBC AUTO-ENTMCNC: 32 G/DL (ref 31–36)
MCV RBC AUTO: 85 FL (ref 82–100)
MONOCYTES NFR BLD AUTO: 0.3 /CMM (ref 0.1–1.3)
MONOCYTES NFR BLD AUTO: 3.3 % (ref 2–12)
NEUTROPHILS # BLD AUTO: 8.6 /CMM (ref 1.8–8.9)
NEUTROPHILS NFR BLD AUTO: 84.7 % (ref 43–81)
NT-PROBNP SERPL-MCNC: 496 PG/ML (ref 0–125)
PLATELET # BLD AUTO: 220 /CMM (ref 150–450)
POTASSIUM SERPL-SCNC: 4.1 MMOL/L (ref 3.5–5.1)
PROT SERPL-MCNC: 7 G/DL (ref 6.4–8.2)
RBC # BLD AUTO: 3.56 MIL/UL (ref 4–5.2)
SODIUM SERPL-SCNC: 131 MMOL/L (ref 136–145)
WBC NRBC COR # BLD AUTO: 10.2 K/UL (ref 4.3–11)

## 2019-07-23 PROCEDURE — C9113 INJ PANTOPRAZOLE SODIUM, VIA: HCPCS

## 2019-07-23 PROCEDURE — G0378 HOSPITAL OBSERVATION PER HR: HCPCS

## 2019-07-23 PROCEDURE — A6253 ABSORPT DRG > 48 SQ IN W/O B: HCPCS

## 2019-07-23 NOTE — NUR
TELE RN NOTE



RECEIVED PT FROM ER ON [facility] ACCOMPANIED BY ER STAFF. PT IN STABLE CONDITION, AWAKE BUT 
UNRESPONSIVE. CHILDREN AT BEDSIDE. ON O2 NC 2L, SATURATION 96%. NO SIGNS OF SOB OR DISTRESS, 
NO INDICATIONS OF PAIN. TELE MONITOR SR 81. IV IN R HAND IN PLACE. BODY ASSESSED, PHOTOS IN 
CHART. BELONGINGS ACCOUNTED FOR. ALL CURRENT NEEDS ATTENDED TO. BED LOW, LOCKED, UPPER RAILS 
UP, AND CALL LIGHT WITHIN REACH WILL CONT. TO MONITOR.

## 2019-07-23 NOTE — NUR
TO BED 3 BIB PARAMEDICS C/O LOW O2 SAT PER EMS REPORT, O2 SAT 91% ON RA. PT 
AAOX1, NO ACUTE DISTRESS NOTED, RESP EVEN AND UNLABORED. PLACE PT ON CARDIAC 
MONITORING, CONTINUOUS POX, NOTED O2 SAT 90-91% ON RA, PLACE PT ON O2@2L/NC, O2 
SAT IMPROVED TO 97%. ER MD AT BEDSIDE TO EVAL PT WITH ORDERS RECEIVED. WILL 
CARRY OUT ORDERS.

## 2019-07-23 NOTE — NUR
PT FAMILY TALKING TO ER MD AND REQUESTING PT KEPPRA MED TO BE GIVEN PRIOR TO 
ADMISSION. ORDERS RECEIVED. WILL MEDICATE PT.

## 2019-07-24 VITALS — SYSTOLIC BLOOD PRESSURE: 119 MMHG | DIASTOLIC BLOOD PRESSURE: 59 MMHG

## 2019-07-24 VITALS — DIASTOLIC BLOOD PRESSURE: 66 MMHG | SYSTOLIC BLOOD PRESSURE: 114 MMHG

## 2019-07-24 VITALS — DIASTOLIC BLOOD PRESSURE: 57 MMHG | SYSTOLIC BLOOD PRESSURE: 124 MMHG

## 2019-07-24 VITALS — SYSTOLIC BLOOD PRESSURE: 118 MMHG | DIASTOLIC BLOOD PRESSURE: 58 MMHG

## 2019-07-24 LAB
ALBUMIN SERPL BCP-MCNC: 2.9 G/DL (ref 3.4–5)
ALP SERPL-CCNC: 84 U/L (ref 46–116)
ALT SERPL W P-5'-P-CCNC: 21 U/L (ref 12–78)
AST SERPL W P-5'-P-CCNC: 11 U/L (ref 15–37)
BASOPHILS # BLD AUTO: 0.1 /CMM (ref 0–0.2)
BASOPHILS NFR BLD AUTO: 1.2 % (ref 0–2)
BILIRUB SERPL-MCNC: 0.5 MG/DL (ref 0.2–1)
BUN SERPL-MCNC: 9 MG/DL (ref 7–18)
CALCIUM SERPL-MCNC: 8.2 MG/DL (ref 8.5–10.1)
CHLORIDE SERPL-SCNC: 101 MMOL/L (ref 98–107)
CHOLEST SERPL-MCNC: 133 MG/DL (ref ?–200)
CO2 SERPL-SCNC: 25 MMOL/L (ref 21–32)
CREAT SERPL-MCNC: 0.4 MG/DL (ref 0.6–1.3)
EOSINOPHIL NFR BLD AUTO: 2.3 % (ref 0–6)
GLUCOSE SERPL-MCNC: 99 MG/DL (ref 74–106)
HCT VFR BLD AUTO: 29 % (ref 33–45)
HDLC SERPL-MCNC: 59 MG/DL (ref 40–60)
HGB BLD-MCNC: 9.2 G/DL (ref 11.5–14.8)
IRON SERPL-MCNC: 12 UG/DL (ref 50–175)
LDLC SERPL DIRECT ASSAY-MCNC: 59 MG/DL (ref 0–99)
LYMPHOCYTES NFR BLD AUTO: 1.4 /CMM (ref 0.8–4.8)
LYMPHOCYTES NFR BLD AUTO: 16.4 % (ref 20–44)
MAGNESIUM SERPL-MCNC: 2.1 MG/DL (ref 1.8–2.4)
MCHC RBC AUTO-ENTMCNC: 32 G/DL (ref 31–36)
MCV RBC AUTO: 85 FL (ref 82–100)
MONOCYTES NFR BLD AUTO: 0.4 /CMM (ref 0.1–1.3)
MONOCYTES NFR BLD AUTO: 5.1 % (ref 2–12)
NEUTROPHILS # BLD AUTO: 6.4 /CMM (ref 1.8–8.9)
NEUTROPHILS NFR BLD AUTO: 75 % (ref 43–81)
PHOSPHATE SERPL-MCNC: 2.8 MG/DL (ref 2.5–4.9)
PLATELET # BLD AUTO: 208 /CMM (ref 150–450)
POTASSIUM SERPL-SCNC: 3.6 MMOL/L (ref 3.5–5.1)
PROT SERPL-MCNC: 6.8 G/DL (ref 6.4–8.2)
RBC # BLD AUTO: 3.38 MIL/UL (ref 4–5.2)
SODIUM SERPL-SCNC: 135 MMOL/L (ref 136–145)
TIBC SERPL-MCNC: 345 UG/DL (ref 250–450)
TRIGL SERPL-MCNC: 62 MG/DL (ref 30–150)
TSH SERPL DL<=0.005 MIU/L-ACNC: 1.5 UIU/ML (ref 0.36–3.74)
WBC NRBC COR # BLD AUTO: 8.6 K/UL (ref 4.3–11)

## 2019-07-24 RX ADMIN — Medication SCH MG: at 20:58

## 2019-07-24 RX ADMIN — CLOTRIMAZOLE SCH GM: 1 CREAM TOPICAL at 16:47

## 2019-07-24 RX ADMIN — Medication SCH MG: at 13:17

## 2019-07-24 RX ADMIN — LEVETIRACETAM SCH MG: 100 SOLUTION ORAL at 21:01

## 2019-07-24 RX ADMIN — PIPERACILLIN SODIUM AND TAZOBACTAM SODIUM SCH MLS/HR: .375; 3 INJECTION, POWDER, LYOPHILIZED, FOR SOLUTION INTRAVENOUS at 12:28

## 2019-07-24 RX ADMIN — Medication SCH MG: at 10:00

## 2019-07-24 RX ADMIN — SODIUM CHLORIDE PRN MLS/HR: 9 INJECTION, SOLUTION INTRAVENOUS at 00:42

## 2019-07-24 RX ADMIN — Medication SCH MG: at 16:31

## 2019-07-24 RX ADMIN — FAMOTIDINE SCH MG: 20 TABLET, FILM COATED ORAL at 10:00

## 2019-07-24 RX ADMIN — FERROUS SULFATE TAB 325 MG (65 MG ELEMENTAL FE) SCH MG: 325 (65 FE) TAB at 10:00

## 2019-07-24 RX ADMIN — SODIUM CHLORIDE PRN MLS/HR: 9 INJECTION, SOLUTION INTRAVENOUS at 19:12

## 2019-07-24 RX ADMIN — Medication SCH MG: at 13:00

## 2019-07-24 RX ADMIN — ENOXAPARIN SODIUM SCH MG: 40 INJECTION SUBCUTANEOUS at 00:38

## 2019-07-24 RX ADMIN — PIPERACILLIN SODIUM AND TAZOBACTAM SODIUM SCH MLS/HR: .375; 3 INJECTION, POWDER, LYOPHILIZED, FOR SOLUTION INTRAVENOUS at 20:58

## 2019-07-24 RX ADMIN — FOLIC ACID SCH MG: 1 TABLET ORAL at 10:00

## 2019-07-24 NOTE — NUR
TELE RN NOTE



PT IN STABLE CONDITION, AWAKE BUT UNRESPONSIVE. ON O2 NC 2L, SATURATION 96%. NO SIGNS OF SOB 
OR DISTRESS, NO INDICATIONS OF PAIN. TELE MONITOR SR 82. IV IN R HAND IN PLACE WITH IVF 
INFUSING TOLERATING WELL. PT REPOSITIONED PER UNIT PROTOCOL. CURRENT NEEDS ATTENDED TO. BED 
LOW, LOCKED, UPPER RAILS UP, AND CALL LIGHT WITHIN REACH WILL CONT. TO MONITOR AND ENDORSE 
TO NEXT SHIFT FOR STACEY.

## 2019-07-24 NOTE — NUR
TELE RN CLOSING NOTES 



PATIENT LYING IN BED, HOB ELEVATED,  A/O X 1, OPENS EYES. NO PAIN, SOB OR ACUTE DISTRESS 
NOTED. ON 2L O2 VIA NC.  ON TELE MONITOR, SR 80'S. R HAND #18 IV INTACT AND PATENT INFUSING 
NS AT 75 ML/HR. PELVIC CT COMPLETED DURING SHIFT. WOUND CARE COMPLETED AS ORDERED X 2. 
PATIENT FAMILY AT BEDSIDE. MD COMMUNICATION COORDINATED WITH FAMILY AS REQUESTED. NO CHANGES 
IN CONDITION DURING SHIFT. SAFETY MEASURES IN PLACE. BED LOCKED AND IN LOWEST POSITION. CALL 
LIGHT WITHIN REACH. CARE ENDORSED TO NIGHT SHIFT RN.

## 2019-07-24 NOTE — NUR
RN INITIAL NOTES 



PATIENT IN BED, ONLY OPENS EYES. UNRESPONSIVE. ON 2L NC. ON TELE MONITOR, SR. HAS A RIGHT 
HAND #18 WITH NS RUNNING AT 75 ML/HR. NO SIGNS OF ANY PAIN NOR SOB. BED LOCKED AND IN LOWEST 
POSITION. CALL LIGHT WITHIN REACH. WILL CONTINUE TO MONITOR PATIENT CLOSELY

## 2019-07-24 NOTE — NUR
TELE RN NOTE



MANUALLY ADMINISTERED 0500 DOSE OF ZOSYN, TAKEN FROM PYXIS BY CHARGE NURSE. MEDICATION 
BOTTLE NO RECOGNIZED WHEN SCANNED.

## 2019-07-24 NOTE — NUR
FAMILY WANTED DR. ATKINSON TO SEE PATIENT INSTEAD OF EPIC,CM LAKIA KNOW ,SHE WILL NOTIFY DR. ATKINSON FIRST IF OK WITH HIM,PER DMITY OK WITH HIM .

## 2019-07-24 NOTE — NUR
RN INITIAL NOTES 



RECEIVED PATIENT'S REPORT FROM AM RN. PATIENT IS IN BED, OPENS EYES BUT CAN NOT FOLLOW 
COMMENDS, ON 2L NC. ON TELE MONITOR, SR. HAS A RIGHT HAND #18 IV LINE  WITH NS RUNNING AT 75 
ML/HR. NO SIGNS OF ANY PAIN NOR SOB. BED LOCKED AND IN LOWEST POSITION.HOB ELEVATED AT ALL 
TIMES, CALL LIGHT WITHIN REACH. CARE GIVER IS AT THE BEDSIDE ,WILL CONTINUE TO MONITOR 
PATIENT CLOSELY.

## 2019-07-25 VITALS — SYSTOLIC BLOOD PRESSURE: 118 MMHG | DIASTOLIC BLOOD PRESSURE: 70 MMHG

## 2019-07-25 VITALS — DIASTOLIC BLOOD PRESSURE: 60 MMHG | SYSTOLIC BLOOD PRESSURE: 114 MMHG

## 2019-07-25 VITALS — DIASTOLIC BLOOD PRESSURE: 54 MMHG | SYSTOLIC BLOOD PRESSURE: 93 MMHG

## 2019-07-25 VITALS — SYSTOLIC BLOOD PRESSURE: 112 MMHG | DIASTOLIC BLOOD PRESSURE: 55 MMHG

## 2019-07-25 VITALS — DIASTOLIC BLOOD PRESSURE: 67 MMHG | SYSTOLIC BLOOD PRESSURE: 123 MMHG

## 2019-07-25 LAB
APPEARANCE UR: (no result)
BASOPHILS # BLD AUTO: 0.1 /CMM (ref 0–0.2)
BASOPHILS NFR BLD AUTO: 1.4 % (ref 0–2)
BILIRUB UR QL STRIP: NEGATIVE
BUN SERPL-MCNC: 7 MG/DL (ref 7–18)
CALCIUM SERPL-MCNC: 8.1 MG/DL (ref 8.5–10.1)
CHLORIDE SERPL-SCNC: 106 MMOL/L (ref 98–107)
CO2 SERPL-SCNC: 24 MMOL/L (ref 21–32)
COLOR UR: YELLOW
CREAT SERPL-MCNC: 0.3 MG/DL (ref 0.6–1.3)
EOSINOPHIL NFR BLD AUTO: 5.1 % (ref 0–6)
GLUCOSE SERPL-MCNC: 94 MG/DL (ref 74–106)
GLUCOSE UR STRIP-MCNC: NEGATIVE MG/DL
HCT VFR BLD AUTO: 25 % (ref 33–45)
HGB BLD-MCNC: 8 G/DL (ref 11.5–14.8)
HGB UR QL STRIP: (no result) ERY/UL
KETONES UR STRIP-MCNC: NEGATIVE MG/DL
LEUKOCYTE ESTERASE UR QL STRIP: (no result)
LYMPHOCYTES NFR BLD AUTO: 1.1 /CMM (ref 0.8–4.8)
LYMPHOCYTES NFR BLD AUTO: 17.5 % (ref 20–44)
MCHC RBC AUTO-ENTMCNC: 32 G/DL (ref 31–36)
MCV RBC AUTO: 85 FL (ref 82–100)
MONOCYTES NFR BLD AUTO: 0.4 /CMM (ref 0.1–1.3)
MONOCYTES NFR BLD AUTO: 6.4 % (ref 2–12)
NEUTROPHILS # BLD AUTO: 4.5 /CMM (ref 1.8–8.9)
NEUTROPHILS NFR BLD AUTO: 69.6 % (ref 43–81)
NITRITE UR QL STRIP: POSITIVE
PH UR STRIP: 7 [PH] (ref 5–8)
PLATELET # BLD AUTO: 158 /CMM (ref 150–450)
POTASSIUM SERPL-SCNC: 3.3 MMOL/L (ref 3.5–5.1)
PROT UR QL STRIP: (no result) MG/DL
RBC # BLD AUTO: 2.95 MIL/UL (ref 4–5.2)
RBC #/AREA URNS HPF: (no result) /HPF (ref 0–2)
SODIUM SERPL-SCNC: 140 MMOL/L (ref 136–145)
UROBILINOGEN UR STRIP-MCNC: 1 EU/DL
WBC #/AREA URNS HPF: (no result) /HPF (ref 0–3)
WBC NRBC COR # BLD AUTO: 6.4 K/UL (ref 4.3–11)

## 2019-07-25 RX ADMIN — PANTOPRAZOLE SODIUM SCH MG: 40 TABLET, DELAYED RELEASE ORAL at 17:48

## 2019-07-25 RX ADMIN — PIPERACILLIN SODIUM AND TAZOBACTAM SODIUM SCH MLS/HR: .375; 3 INJECTION, POWDER, LYOPHILIZED, FOR SOLUTION INTRAVENOUS at 20:14

## 2019-07-25 RX ADMIN — CLOTRIMAZOLE SCH GM: 1 CREAM TOPICAL at 17:49

## 2019-07-25 RX ADMIN — FERROUS SULFATE TAB 325 MG (65 MG ELEMENTAL FE) SCH MG: 325 (65 FE) TAB at 09:13

## 2019-07-25 RX ADMIN — PIPERACILLIN SODIUM AND TAZOBACTAM SODIUM SCH MLS/HR: .375; 3 INJECTION, POWDER, LYOPHILIZED, FOR SOLUTION INTRAVENOUS at 12:53

## 2019-07-25 RX ADMIN — SODIUM CHLORIDE SCH MLS/HR: 9 INJECTION, SOLUTION INTRAVENOUS at 17:48

## 2019-07-25 RX ADMIN — Medication SCH MG: at 09:12

## 2019-07-25 RX ADMIN — Medication SCH MG: at 20:14

## 2019-07-25 RX ADMIN — FAMOTIDINE SCH MG: 20 TABLET, FILM COATED ORAL at 09:13

## 2019-07-25 RX ADMIN — Medication SCH MG: at 17:48

## 2019-07-25 RX ADMIN — CLOTRIMAZOLE SCH GM: 1 CREAM TOPICAL at 09:37

## 2019-07-25 RX ADMIN — FOLIC ACID SCH MG: 1 TABLET ORAL at 09:13

## 2019-07-25 RX ADMIN — PIPERACILLIN SODIUM AND TAZOBACTAM SODIUM SCH MLS/HR: .375; 3 INJECTION, POWDER, LYOPHILIZED, FOR SOLUTION INTRAVENOUS at 04:27

## 2019-07-25 RX ADMIN — LEVETIRACETAM SCH MG: 100 SOLUTION ORAL at 21:39

## 2019-07-25 RX ADMIN — PANTOPRAZOLE SODIUM SCH MG: 40 TABLET, DELAYED RELEASE ORAL at 10:06

## 2019-07-25 RX ADMIN — Medication SCH MG: at 13:28

## 2019-07-25 RX ADMIN — ENOXAPARIN SODIUM SCH MG: 40 INJECTION SUBCUTANEOUS at 00:45

## 2019-07-25 RX ADMIN — Medication PRN MG: at 16:24

## 2019-07-25 NOTE — NUR
MS RN NOTES



PATIENT DAUGHTER REFUSED SIMETHICONE 160MG PO AND STATED "MY MOM HAD A BOWEL MOVEMENT I 
DON'T THINK SHE NEEDS IT RIGHT NOW" EDUCATION REGARDING MEDICATION GIVEN. WILL CONT TO 
MONITOR.

## 2019-07-25 NOTE — NUR
MS RN NOTE

1400 FERRLICIT IV HELD UNTIL 1700 TO ALLOW FOR ANTIBIOTIC TO BE COMPLETED.  FERRLICIT 
ADMINISTERED AT APPROXIMATELY 1730.  MEDICATION COMPLETED W/O INCIDENT.

## 2019-07-25 NOTE — NUR
RN OPENING NOTES 



RECEIVED PATIENT'S IN BED, OPENS EYES BUT CAN NOT FOLLOW COMMANDS, ON 2L NC. ON TELE 
MONITOR, SR 80'S.  R HAND #18 IV LINE  INTACT AND PATENT WITH NS RUNNING AT 75 ML/HR. NO 
SOB, PAIN OR ACUTE DISTRESS NOTED. BED LOCKED AND IN LOWEST POSITION.HOB ELEVATED AT ALL 
TIMES, CALL LIGHT WITHIN REACH. CARE GIVER IS AT THE BEDSIDE ,WILL CONTINUE TO MONITOR.

## 2019-07-25 NOTE — NUR
MS RN OPENING NOTES 



RECEIVED PATIENT IN BED, A/OX1, OPENS EYES BUT UNABLE TO FOLLOW COMMANDS. FAMILY AT BEDSIDE. 
ON OXYGEN 2L VIA NC, SATURATING 98%. IV SITE RIGHT HAND #18, FLUSHING AND PATENT, SITE 
C/D/I. NO SOB, PAIN OR ACUTE DISTRESS NOTED. PER REPORT FAMILY IS VERY DEMANDING. PER 
DAUGHTER, CAREGIVER WILL BE WITH PATIENT THROUGHOUT THE NIGHT. SAFETY MEASURES IN PLACE; BED 
LOCKED IN LOWEST POSITION, HOB ELEVATED AT ALL TIMES TO PREVENT ASPIRATION, CALL LIGHT 
WITHIN REACH, SIDE RAILS UP X2. WILL CONT TO MONITOR PT CLOSELY.

## 2019-07-25 NOTE — NUR
WOUND CARE CONSULT    WOUND CARE RECEIVED CONSULT FOR SACRUM.  WOUND CARE WILL DEFER CONSULT 
AND TREATMENT PLANS TO PLASTIC SURGICAL TEAM WHO ARE CURRENTLY FOLLOWING THIS PATIENT.  
PATIENT WITH WALTER AT 9, ALL PRESSURE ULCER PREVENTION MEASURES ARE NOTED TO BE IN PLACE AT 
THIS TIME.  WILL SEE PRN.

## 2019-07-25 NOTE — NUR
MS RN CLOSING NOTES 



PATIENT IN BED, A/O X 1, OPENS EYES BUT CAN NOT FOLLOW COMMANDS, ON 2L NC. 02 SAT 95%.  R 
HAND #18 IV LINE  INTACT AND PATENT. NO SOB, PAIN OR ACUTE DISTRESS NOTED. FAMILY AT BEDSIDE 
ALL DAY.  FAMILY DEMANDS DEEMED EXCESSIVE THROUGHOUT SHIFT. MULTIPLE MD'S BEDSIDE TODAY. 
FAMILY ASSURED ON MULTIPLE OCCASSIONS BY MD'S THAT PATIENT WAS PROGRESSING WELL, CARE PLANS 
WERE IN PLACE AND SAID PLANS WERE BEING FOLLWED THROUGH AS ORDERED. WOUND CARE COMPLETED 
THREE TIMES DURING SHIFT.  SAFETY MEASURES IN PLACE. BED LOCKED IN LOWEST POSITION. HOB 
ELEVATED AT ALL TIMES, CALL LIGHT WITHIN REACH. CARE ENDORSED TO NIGHT SHIFT RN.

## 2019-07-26 VITALS — DIASTOLIC BLOOD PRESSURE: 53 MMHG | SYSTOLIC BLOOD PRESSURE: 118 MMHG

## 2019-07-26 VITALS — SYSTOLIC BLOOD PRESSURE: 124 MMHG | DIASTOLIC BLOOD PRESSURE: 61 MMHG

## 2019-07-26 VITALS — DIASTOLIC BLOOD PRESSURE: 54 MMHG | SYSTOLIC BLOOD PRESSURE: 122 MMHG

## 2019-07-26 LAB
BASOPHILS # BLD AUTO: 0.1 /CMM (ref 0–0.2)
BASOPHILS NFR BLD AUTO: 1.8 % (ref 0–2)
BUN SERPL-MCNC: 5 MG/DL (ref 7–18)
CALCIUM SERPL-MCNC: 8.2 MG/DL (ref 8.5–10.1)
CHLORIDE SERPL-SCNC: 106 MMOL/L (ref 98–107)
CO2 SERPL-SCNC: 24 MMOL/L (ref 21–32)
CREAT SERPL-MCNC: 0.3 MG/DL (ref 0.6–1.3)
EOSINOPHIL NFR BLD AUTO: 5.9 % (ref 0–6)
FERRITIN SERPL-MCNC: 108 NG/ML (ref 8–388)
GLUCOSE SERPL-MCNC: 93 MG/DL (ref 74–106)
HCT VFR BLD AUTO: 26 % (ref 33–45)
HGB BLD-MCNC: 8.2 G/DL (ref 11.5–14.8)
IRON SERPL-MCNC: 175 UG/DL (ref 50–175)
LYMPHOCYTES NFR BLD AUTO: 1.1 /CMM (ref 0.8–4.8)
LYMPHOCYTES NFR BLD AUTO: 22.7 % (ref 20–44)
MCHC RBC AUTO-ENTMCNC: 32 G/DL (ref 31–36)
MCV RBC AUTO: 85 FL (ref 82–100)
MONOCYTES NFR BLD AUTO: 0.4 /CMM (ref 0.1–1.3)
MONOCYTES NFR BLD AUTO: 7.7 % (ref 2–12)
NEUTROPHILS # BLD AUTO: 3.1 /CMM (ref 1.8–8.9)
NEUTROPHILS NFR BLD AUTO: 61.9 % (ref 43–81)
PLATELET # BLD AUTO: 188 /CMM (ref 150–450)
POTASSIUM SERPL-SCNC: 3.4 MMOL/L (ref 3.5–5.1)
RBC # BLD AUTO: 3.03 MIL/UL (ref 4–5.2)
SODIUM SERPL-SCNC: 138 MMOL/L (ref 136–145)
TIBC SERPL-MCNC: 256 UG/DL (ref 250–450)
WBC NRBC COR # BLD AUTO: 5 K/UL (ref 4.3–11)

## 2019-07-26 RX ADMIN — PANTOPRAZOLE SODIUM SCH MG: 40 TABLET, DELAYED RELEASE ORAL at 08:34

## 2019-07-26 RX ADMIN — SODIUM CHLORIDE SCH MLS/HR: 9 INJECTION, SOLUTION INTRAVENOUS at 16:19

## 2019-07-26 RX ADMIN — PIPERACILLIN SODIUM AND TAZOBACTAM SODIUM SCH MLS/HR: .375; 3 INJECTION, POWDER, LYOPHILIZED, FOR SOLUTION INTRAVENOUS at 04:40

## 2019-07-26 RX ADMIN — POTASSIUM CHLORIDE SCH MEQ: 1500 TABLET, EXTENDED RELEASE ORAL at 10:15

## 2019-07-26 RX ADMIN — PIPERACILLIN SODIUM AND TAZOBACTAM SODIUM SCH MLS/HR: .375; 3 INJECTION, POWDER, LYOPHILIZED, FOR SOLUTION INTRAVENOUS at 11:26

## 2019-07-26 RX ADMIN — LEVETIRACETAM SCH MG: 100 SOLUTION ORAL at 21:10

## 2019-07-26 RX ADMIN — Medication SCH MG: at 13:30

## 2019-07-26 RX ADMIN — Medication SCH MG: at 08:33

## 2019-07-26 RX ADMIN — Medication SCH MG: at 21:09

## 2019-07-26 RX ADMIN — ENOXAPARIN SODIUM SCH MG: 40 INJECTION SUBCUTANEOUS at 00:58

## 2019-07-26 RX ADMIN — PANTOPRAZOLE SODIUM SCH MG: 40 TABLET, DELAYED RELEASE ORAL at 17:31

## 2019-07-26 RX ADMIN — CLOTRIMAZOLE SCH GM: 1 CREAM TOPICAL at 17:32

## 2019-07-26 RX ADMIN — Medication SCH MG: at 17:31

## 2019-07-26 RX ADMIN — FOLIC ACID SCH MG: 1 TABLET ORAL at 08:34

## 2019-07-26 RX ADMIN — PIPERACILLIN SODIUM AND TAZOBACTAM SODIUM SCH MLS/HR: .375; 3 INJECTION, POWDER, LYOPHILIZED, FOR SOLUTION INTRAVENOUS at 20:13

## 2019-07-26 RX ADMIN — POTASSIUM CHLORIDE SCH MEQ: 1500 TABLET, EXTENDED RELEASE ORAL at 12:00

## 2019-07-26 RX ADMIN — CLOTRIMAZOLE SCH GM: 1 CREAM TOPICAL at 08:46

## 2019-07-26 RX ADMIN — POTASSIUM CHLORIDE SCH MEQ: 1500 TABLET, EXTENDED RELEASE ORAL at 11:26

## 2019-07-26 NOTE — NUR
RN INITIAL NOTES

PT IS IN BED SLEEPING, CARE GIVER IS AT THE BEDSIDE WITH THE PATIENT. PT IS ON NC 2L. BED IS 
AT THE  LOWEST POSITION, CALL LIGHT WITHIN REACH OF THE PT. BED ALARM IS ON. WILL CONTINUE 
TO MONITOR FOR ANY CHANGES.

## 2019-07-26 NOTE — NUR
RN CLOSING NOTE

PT IS LAYING ON THE BED WITH DAUGHTER AT BED SIDE. FAMILY HAS REQUESTED A NEW IV SITE DUE TO 
LEAKAGE. IV INSERTION HAS BEEN ENDORSED TO ON COMING SHIFT. PT DOES NOT SHOW ANY SIGNS OF 
DISTRESS. ON 2L NC, 98 % O2 SAT. BED IS LOCKED IN A HIGH FOWLERS POSITION AND LOCKED. CALL 
LIGHT IS WITHIN REACH ALL SAFETY MEASURES HAVE BEEN MET. STACEY HAS BEEN ENDORSED TO ONCOMING 
NURSE.

## 2019-07-26 NOTE — NUR
MS RN CLOSING NOTES



PATIENT RESTING IN BED, OPENS EYES BUT UNABLE TO FOLLOW COMMANDS. CAREGIVER AT BEDSIDE, ON 
OXYGEN 2L VIA NC, SATURATING 97-98%. IV SITE RIGHT HAND #18, FLUSHING AND PATENT, SITE 
C/D/I, IV FLUIDS RUNNING AS ORDERED. NO SOB, PAIN OR ACUTE DISTRESS NOTED. SAFETY MEASURES 
MAINTAINED THROUGHOUT SHIFT; BED LOCKED AND IN LOWEST POSITION, HOB ELEVATED AT ALL TIMES TO 
PREVENT ASPIRATION, CALL LIGHT WITHIN REACH, SIDE RAILS UP X2. ALL MD ORDERS ATTENDED, ALL 
NEEDS ANTICIPATED AND MET. WOUND TX AS ORDERED, REPOSITIONED Q2H. WILL ENDORSE TO AM NURSE 
FOR STACEY.

## 2019-07-27 VITALS — DIASTOLIC BLOOD PRESSURE: 59 MMHG | SYSTOLIC BLOOD PRESSURE: 124 MMHG

## 2019-07-27 VITALS — SYSTOLIC BLOOD PRESSURE: 117 MMHG | DIASTOLIC BLOOD PRESSURE: 57 MMHG

## 2019-07-27 VITALS — DIASTOLIC BLOOD PRESSURE: 65 MMHG | SYSTOLIC BLOOD PRESSURE: 110 MMHG

## 2019-07-27 VITALS — SYSTOLIC BLOOD PRESSURE: 106 MMHG | DIASTOLIC BLOOD PRESSURE: 59 MMHG

## 2019-07-27 LAB
BASOPHILS # BLD AUTO: 0.1 /CMM (ref 0–0.2)
BASOPHILS NFR BLD AUTO: 2.1 % (ref 0–2)
BUN SERPL-MCNC: 4 MG/DL (ref 7–18)
CALCIUM SERPL-MCNC: 8.9 MG/DL (ref 8.5–10.1)
CHLORIDE SERPL-SCNC: 102 MMOL/L (ref 98–107)
CO2 SERPL-SCNC: 24 MMOL/L (ref 21–32)
CREAT SERPL-MCNC: 0.3 MG/DL (ref 0.6–1.3)
EOSINOPHIL NFR BLD AUTO: 5.3 % (ref 0–6)
GLUCOSE SERPL-MCNC: 90 MG/DL (ref 74–106)
HCT VFR BLD AUTO: 28 % (ref 33–45)
HGB BLD-MCNC: 8.9 G/DL (ref 11.5–14.8)
LYMPHOCYTES NFR BLD AUTO: 1.1 /CMM (ref 0.8–4.8)
LYMPHOCYTES NFR BLD AUTO: 26.8 % (ref 20–44)
MAGNESIUM SERPL-MCNC: 1.9 MG/DL (ref 1.8–2.4)
MCHC RBC AUTO-ENTMCNC: 32 G/DL (ref 31–36)
MCV RBC AUTO: 84 FL (ref 82–100)
MONOCYTES NFR BLD AUTO: 0.3 /CMM (ref 0.1–1.3)
MONOCYTES NFR BLD AUTO: 7.6 % (ref 2–12)
NEUTROPHILS # BLD AUTO: 2.5 /CMM (ref 1.8–8.9)
NEUTROPHILS NFR BLD AUTO: 58.2 % (ref 43–81)
PHOSPHATE SERPL-MCNC: 3.1 MG/DL (ref 2.5–4.9)
PLATELET # BLD AUTO: 207 /CMM (ref 150–450)
POTASSIUM SERPL-SCNC: 4 MMOL/L (ref 3.5–5.1)
RBC # BLD AUTO: 3.3 MIL/UL (ref 4–5.2)
SODIUM SERPL-SCNC: 136 MMOL/L (ref 136–145)
WBC NRBC COR # BLD AUTO: 4.2 K/UL (ref 4.3–11)

## 2019-07-27 RX ADMIN — PIPERACILLIN SODIUM AND TAZOBACTAM SODIUM SCH MLS/HR: .375; 3 INJECTION, POWDER, LYOPHILIZED, FOR SOLUTION INTRAVENOUS at 11:32

## 2019-07-27 RX ADMIN — Medication SCH MG: at 17:41

## 2019-07-27 RX ADMIN — CLOTRIMAZOLE SCH APPLIC: 1 CREAM TOPICAL at 17:41

## 2019-07-27 RX ADMIN — LEVETIRACETAM SCH MG: 100 SOLUTION ORAL at 21:03

## 2019-07-27 RX ADMIN — ENOXAPARIN SODIUM SCH MG: 40 INJECTION SUBCUTANEOUS at 00:13

## 2019-07-27 RX ADMIN — SODIUM CHLORIDE SCH MLS/HR: 9 INJECTION, SOLUTION INTRAVENOUS at 14:54

## 2019-07-27 RX ADMIN — PIPERACILLIN SODIUM AND TAZOBACTAM SODIUM SCH MLS/HR: .375; 3 INJECTION, POWDER, LYOPHILIZED, FOR SOLUTION INTRAVENOUS at 03:41

## 2019-07-27 RX ADMIN — PANTOPRAZOLE SODIUM SCH MG: 40 TABLET, DELAYED RELEASE ORAL at 17:41

## 2019-07-27 RX ADMIN — PANTOPRAZOLE SODIUM SCH MG: 40 TABLET, DELAYED RELEASE ORAL at 08:13

## 2019-07-27 RX ADMIN — FOLIC ACID SCH MG: 1 TABLET ORAL at 08:13

## 2019-07-27 RX ADMIN — Medication SCH MG: at 08:14

## 2019-07-27 RX ADMIN — PIPERACILLIN SODIUM AND TAZOBACTAM SODIUM SCH MLS/HR: .375; 3 INJECTION, POWDER, LYOPHILIZED, FOR SOLUTION INTRAVENOUS at 20:21

## 2019-07-27 RX ADMIN — CLOTRIMAZOLE SCH APPLIC: 1 CREAM TOPICAL at 09:27

## 2019-07-27 RX ADMIN — Medication SCH MG: at 13:03

## 2019-07-27 RX ADMIN — Medication SCH MG: at 21:03

## 2019-07-27 NOTE — NUR
MS RN NOTES

PT REPORT GIVEN TO IBIS CARRENO IN [place].PT TOLERATED WELL.NO SOB AND ACUTE DISTRESS NOTED.

## 2019-07-27 NOTE — NUR
MS/RN NOTES



RECEIVED PATIENT FROM IBIS ROMERO. PATIENT REMAINS IN STABLE CONDITION. WILL CONTINUE TO MONITOR 
CLOSELY. CAREGIVER AT BEDSIDE AT ALL TIMES.

## 2019-07-27 NOTE — NUR
MS/RN NOTES 

PATIENT RECEIVED AT THIS TIME, IN NO ACUTE DISTRESS, RESPIRATION EVEN AND UNLABORED. NO SOB 
NOTED. NO S/S OF PAIN NOTED AT THIS TIME, CAREGIVER AT BED SIDE, PATIENT ON O2 2LPM VIA NC, 
SATURATION NOTED 96%. SAFETY MAINTAINED, BED AT THE LOWEST LOCKED POSITION. CALL LIGHT 
WITHIN REACH. WILL CONTINUE TO MONITOR PATIENT AS PER PLAN OF CARE.

## 2019-07-27 NOTE — NUR
MS RN NOTE

PER DAUGHTER'S REQUEST DISCONNECTED IV LINE ON THE RIGHT HAND 18G AT THIS TIME. MILD REDNESS 
BUT NOT INFILTRATION NOTED. PATIENT TOLERATED WELL. WILL CONTINUE TO MONITOR THE PATIENT.

## 2019-07-27 NOTE — NUR
RN NOTES:

EDUCATE PT'S DAUGHTER ABOUT USING GOWN AND GLOVES FOR EVERY PT'S CONTACT, PERFORMING HAND 
WASHING BEFORE AND AFTER TOUCHING PT AND BELONGINGS.

## 2019-07-27 NOTE — NUR
MS RN OPENING NOTES 

PATIENT IN BED, SLEEPING, IN NO ACUTE DISTRESS, RESPIRATION EVEN AND UNLABORED. NO SOB 
NOTED. NO S/S OF PAIN NOTED AT THIS TIME, CAREGIVER AT BED SIDE, PATIENT ON O2 2LPM VIA NC. 
IV SITE ON RIGHT HAND NOTED WITH NO S/S OF INFECTION, INFILTRATION. SAFETY MAINTAINED, BED 
AT THE LOWEST LOCKED POSITION. CALL LIGHT WITHIN REACH. WILL CONTINUE MONITOR.

## 2019-07-27 NOTE — NUR
MS/RN NOTES 

PATIENT IN BED, SLEEPING COMFORTABLY AT THIS TIME,  IN NO ACUTE DISTRESS, RESPIRATION EVEN 
AND UNLABORED. NO SOB NOTED. NO S/S OF PAIN NOTED AT THIS TIME, CAREGIVER AT BED SIDE, 
PATIENT ON O2 2LPM VIA NC, SATURATION NOTED 96%. IV SITE ON RIGHT HAND NOTED WITH NO S/S OF 
INFECTION, INFILTRATION. SAFETY MAINTAINED, BED AT THE LOWEST LOCKED POSITION. CALL LIGHT 
WITHIN REACH. WILL ENDORSE TO THE AM SHIFT NURSE FOR STACEY.

## 2019-07-27 NOTE — NUR
RN INITIAL NOTES:

RECEIVED REPORT FROM RONIT BAUMANN. PT IN BED, AWAKE, MET WITH DAUGHTER AT BED SIDE, PT ON 2L 
OXYGEN VIA NC, RESPIRATIONS EVEN AND UNLABORED. DISCUSSED WITH PT'S DAUGHTER ABOUT PLAN OF 
CARE TONIGHT AND PLACING PT ON ISOLATION ESBL URINE, UTILIZATION OF PPE. IV ACCESS PATENT 
AND FLUSHING WELL, INFUSING WITH NS AT TKO. ANSWERED DAUGHTER'S QUESTION AND CONCERN. 
DAUGHTER REQUESTING FOR COPY OF LAB WORKS, PER RN CHARGE SHE'S TAKING CARE OF IT AND WILL 
GIVE TO DAUGHTER ONCE DONE PRINTING. BLE OFFLOADED. FAMILY PARTICULAR WITH PT'S CARE. 
REASSURANCE PROVIDED. SAFETY PRECAUTIONS FOR FALL INITIATED, CALL LIGHT IN REACH, WILL 
CONTINUE MONITORING PT.

## 2019-07-27 NOTE — NUR
MS/ RN CLOSING NOTES



PATIENT CONTINUES TO REMAIN IN STABLE CONDITION. PROVIDED COMFORT AND SAFETY THROUGHOUT THE 
SHIFT. PATIENT IN BED AWAKE ALERT AND ABLE TO RESPOND TO TACTILE STIMULI. FAMILY MEMBER AT 
BEDSIDE AT ALL TIMES. RESPIRATION EVEN AND UNLABORED. SKIN IS DRY WARM TO TOUCH. NO PAIN OR 
ACUTE DISTRESS AT THIS TIME. PATIENT ON O2 2LPM VIA NC. TOLERATED WELL. IV SITE ON RIGHT 
HAND AND LEFT HAND. NOTED WITH NO S/S OF INFECTION, INFILTRATION. ALL NEEDS ANTICIPATED. 
KEPT CLEAN AND DRY. CALL LIGHT WITHIN REACHED. BED LOCKED AND IN LOWEST POSITION. WILL 
CONTINUE TO MONITOR. ENDORSED TO PM NURSE FOR STACEY.

## 2019-07-27 NOTE — NUR
rn notes:

assisted cna in changing pt's diaper, wound care tx provided. oral care provided. kept on 
aspiration precautions, due meds administered.

## 2019-07-28 VITALS — DIASTOLIC BLOOD PRESSURE: 72 MMHG | SYSTOLIC BLOOD PRESSURE: 110 MMHG

## 2019-07-28 VITALS — DIASTOLIC BLOOD PRESSURE: 58 MMHG | SYSTOLIC BLOOD PRESSURE: 119 MMHG

## 2019-07-28 VITALS — DIASTOLIC BLOOD PRESSURE: 65 MMHG | SYSTOLIC BLOOD PRESSURE: 113 MMHG

## 2019-07-28 VITALS — SYSTOLIC BLOOD PRESSURE: 114 MMHG | DIASTOLIC BLOOD PRESSURE: 89 MMHG

## 2019-07-28 VITALS — DIASTOLIC BLOOD PRESSURE: 52 MMHG | SYSTOLIC BLOOD PRESSURE: 104 MMHG

## 2019-07-28 LAB
BASOPHILS # BLD AUTO: 0.2 /CMM (ref 0–0.2)
BASOPHILS NFR BLD AUTO: 3.6 % (ref 0–2)
BUN SERPL-MCNC: 6 MG/DL (ref 7–18)
CALCIUM SERPL-MCNC: 8.8 MG/DL (ref 8.5–10.1)
CHLORIDE SERPL-SCNC: 104 MMOL/L (ref 98–107)
CO2 SERPL-SCNC: 27 MMOL/L (ref 21–32)
CREAT SERPL-MCNC: 0.4 MG/DL (ref 0.6–1.3)
EOSINOPHIL NFR BLD AUTO: 3.9 % (ref 0–6)
GLUCOSE SERPL-MCNC: 92 MG/DL (ref 74–106)
HCT VFR BLD AUTO: 26 % (ref 33–45)
HGB BLD-MCNC: 8.4 G/DL (ref 11.5–14.8)
LYMPHOCYTES NFR BLD AUTO: 1.6 /CMM (ref 0.8–4.8)
LYMPHOCYTES NFR BLD AUTO: 34.1 % (ref 20–44)
MAGNESIUM SERPL-MCNC: 2 MG/DL (ref 1.8–2.4)
MCHC RBC AUTO-ENTMCNC: 32 G/DL (ref 31–36)
MCV RBC AUTO: 85 FL (ref 82–100)
MONOCYTES NFR BLD AUTO: 0.3 /CMM (ref 0.1–1.3)
MONOCYTES NFR BLD AUTO: 5.7 % (ref 2–12)
NEUTROPHILS # BLD AUTO: 2.5 /CMM (ref 1.8–8.9)
NEUTROPHILS NFR BLD AUTO: 52.7 % (ref 43–81)
PHOSPHATE SERPL-MCNC: 4 MG/DL (ref 2.5–4.9)
PLATELET # BLD AUTO: 232 /CMM (ref 150–450)
POTASSIUM SERPL-SCNC: 3.9 MMOL/L (ref 3.5–5.1)
RBC # BLD AUTO: 3.11 MIL/UL (ref 4–5.2)
SODIUM SERPL-SCNC: 139 MMOL/L (ref 136–145)
WBC NRBC COR # BLD AUTO: 4.7 K/UL (ref 4.3–11)

## 2019-07-28 RX ADMIN — PIPERACILLIN SODIUM AND TAZOBACTAM SODIUM SCH MLS/HR: .375; 3 INJECTION, POWDER, LYOPHILIZED, FOR SOLUTION INTRAVENOUS at 11:01

## 2019-07-28 RX ADMIN — LEVETIRACETAM SCH MG: 100 SOLUTION ORAL at 21:19

## 2019-07-28 RX ADMIN — Medication SCH MG: at 08:15

## 2019-07-28 RX ADMIN — ACETYLCYSTEINE SCH MG: 100 INHALANT RESPIRATORY (INHALATION) at 12:00

## 2019-07-28 RX ADMIN — PANTOPRAZOLE SODIUM SCH MG: 40 TABLET, DELAYED RELEASE ORAL at 08:15

## 2019-07-28 RX ADMIN — Medication SCH MG: at 17:20

## 2019-07-28 RX ADMIN — PIPERACILLIN SODIUM AND TAZOBACTAM SODIUM SCH MLS/HR: .375; 3 INJECTION, POWDER, LYOPHILIZED, FOR SOLUTION INTRAVENOUS at 21:19

## 2019-07-28 RX ADMIN — CLOTRIMAZOLE SCH APPLIC: 1 CREAM TOPICAL at 17:22

## 2019-07-28 RX ADMIN — Medication SCH MG: at 13:06

## 2019-07-28 RX ADMIN — Medication SCH MG: at 21:20

## 2019-07-28 RX ADMIN — CLOTRIMAZOLE SCH APPLIC: 1 CREAM TOPICAL at 09:25

## 2019-07-28 RX ADMIN — Medication PRN MG: at 14:49

## 2019-07-28 RX ADMIN — ACETYLCYSTEINE SCH MG: 100 INHALANT RESPIRATORY (INHALATION) at 15:30

## 2019-07-28 RX ADMIN — FOLIC ACID SCH MG: 1 TABLET ORAL at 08:15

## 2019-07-28 RX ADMIN — ENOXAPARIN SODIUM SCH MG: 40 INJECTION SUBCUTANEOUS at 00:25

## 2019-07-28 RX ADMIN — PIPERACILLIN SODIUM AND TAZOBACTAM SODIUM SCH MLS/HR: .375; 3 INJECTION, POWDER, LYOPHILIZED, FOR SOLUTION INTRAVENOUS at 03:56

## 2019-07-28 RX ADMIN — SODIUM CHLORIDE SCH MLS/HR: 9 INJECTION, SOLUTION INTRAVENOUS at 14:49

## 2019-07-28 RX ADMIN — PANTOPRAZOLE SODIUM SCH MG: 40 GRANULE, DELAYED RELEASE ORAL at 21:20

## 2019-07-28 NOTE — NUR
rn closing notes:

pt in bed, remains aphasic, on 2l oxygen via nc respirations even and unlabored. no facial 
grimace noted. remains on aspiration precautions. iv access kept patent and flushing well, 
on hl. no s/s of iv infiltration noted. total of 6diaper changed through out the shift. ble 
kept offloaded. vs remains stable, needs attended. dr de la fuente aware of family request for 
different MD, rn charge aware. safety precautions for fall remains engaged, on isolation, 
will endorse to day rn for continuity of care.

## 2019-07-28 NOTE — NUR
RN INITIAL NOTES:

RECEIVED REPORT FROM AM RN. PT IN BED, AWAKE, MET WITH DAUGHTER AT BED SIDE, PT ON 2L OXYGEN 
VIA NC, RESPIRATIONS EVEN AND UNLABORED. DISCUSSED WITH PT'S DAUGHTER ABOUT PLAN OF CARE 
TONIGHT. PT IS ON ISOLATION IV ACCESS PATENT AND FLUSHING WELL SL.. ANSWERED DAUGHTER'S 
QUESTION AND CONCERN.  BLE OFFLOADED. FAMILY PARTICULAR WITH PT'S CARE. REASSURANCE 
PROVIDED. SAFETY PRECAUTIONS FOR FALL INITIATED, CALL LIGHT IN REACH, WILL CONTINUE 
MONITORING PT.

## 2019-07-28 NOTE — NUR
ms rn notes

pt in bed, daughter at bedside. daughter fed all 3 meals. vs remained stable throughout 
shift. no labored breathing. on 2l nc. pt non verbal; opens eyes and tracks. wound tx 
rendered as ordered. daughter (kenzie) updated with poc. dr herrera rounded with pt. will 
endorse to pm nurse for shaka.

## 2019-07-28 NOTE — NUR
ms rn notes

received pt in bed, asleep but arousable. pt is nonverbal. vs stable. on 2L nc. r hand 22g 
on tko. no s/sx of infiltration. pt has dry diaper on. bed alarm on. isolation precautions 
maintained. bed in locked/lowest position. call light in reach. will cont to monitor.

## 2019-07-29 VITALS — SYSTOLIC BLOOD PRESSURE: 114 MMHG | DIASTOLIC BLOOD PRESSURE: 89 MMHG

## 2019-07-29 VITALS — DIASTOLIC BLOOD PRESSURE: 49 MMHG | SYSTOLIC BLOOD PRESSURE: 93 MMHG

## 2019-07-29 VITALS — SYSTOLIC BLOOD PRESSURE: 138 MMHG | DIASTOLIC BLOOD PRESSURE: 59 MMHG

## 2019-07-29 VITALS — SYSTOLIC BLOOD PRESSURE: 140 MMHG | DIASTOLIC BLOOD PRESSURE: 72 MMHG

## 2019-07-29 LAB
BASOPHILS # BLD AUTO: 0.1 /CMM (ref 0–0.2)
BASOPHILS NFR BLD AUTO: 1.6 % (ref 0–2)
BUN SERPL-MCNC: 6 MG/DL (ref 7–18)
CALCIUM SERPL-MCNC: 8.7 MG/DL (ref 8.5–10.1)
CHLORIDE SERPL-SCNC: 105 MMOL/L (ref 98–107)
CO2 SERPL-SCNC: 27 MMOL/L (ref 21–32)
CREAT SERPL-MCNC: 0.3 MG/DL (ref 0.6–1.3)
EOSINOPHIL NFR BLD AUTO: 3 % (ref 0–6)
GLUCOSE SERPL-MCNC: 95 MG/DL (ref 74–106)
HCT VFR BLD AUTO: 27 % (ref 33–45)
HGB BLD-MCNC: 8.8 G/DL (ref 11.5–14.8)
LYMPHOCYTES NFR BLD AUTO: 1.5 /CMM (ref 0.8–4.8)
LYMPHOCYTES NFR BLD AUTO: 27.3 % (ref 20–44)
MCHC RBC AUTO-ENTMCNC: 32 G/DL (ref 31–36)
MCV RBC AUTO: 85 FL (ref 82–100)
MONOCYTES NFR BLD AUTO: 0.3 /CMM (ref 0.1–1.3)
MONOCYTES NFR BLD AUTO: 5.8 % (ref 2–12)
NEUTROPHILS # BLD AUTO: 3.3 /CMM (ref 1.8–8.9)
NEUTROPHILS NFR BLD AUTO: 62.3 % (ref 43–81)
PLATELET # BLD AUTO: 240 /CMM (ref 150–450)
POTASSIUM SERPL-SCNC: 3.3 MMOL/L (ref 3.5–5.1)
RBC # BLD AUTO: 3.22 MIL/UL (ref 4–5.2)
SODIUM SERPL-SCNC: 141 MMOL/L (ref 136–145)
WBC NRBC COR # BLD AUTO: 5.4 K/UL (ref 4.3–11)

## 2019-07-29 RX ADMIN — LEVETIRACETAM SCH MG: 100 SOLUTION ORAL at 21:48

## 2019-07-29 RX ADMIN — Medication SCH MG: at 19:40

## 2019-07-29 RX ADMIN — PIPERACILLIN SODIUM AND TAZOBACTAM SODIUM SCH MLS/HR: .375; 3 INJECTION, POWDER, LYOPHILIZED, FOR SOLUTION INTRAVENOUS at 11:23

## 2019-07-29 RX ADMIN — ACETYLCYSTEINE SCH MG: 100 INHALANT RESPIRATORY (INHALATION) at 15:30

## 2019-07-29 RX ADMIN — ACETYLCYSTEINE SCH MG: 100 INHALANT RESPIRATORY (INHALATION) at 07:39

## 2019-07-29 RX ADMIN — FOLIC ACID SCH MG: 1 TABLET ORAL at 08:28

## 2019-07-29 RX ADMIN — CLOTRIMAZOLE SCH APPLIC: 1 CREAM TOPICAL at 08:28

## 2019-07-29 RX ADMIN — Medication SCH MG: at 12:55

## 2019-07-29 RX ADMIN — Medication SCH MG: at 22:45

## 2019-07-29 RX ADMIN — ENOXAPARIN SODIUM SCH MG: 40 INJECTION SUBCUTANEOUS at 00:01

## 2019-07-29 RX ADMIN — PANTOPRAZOLE SODIUM SCH MG: 40 GRANULE, DELAYED RELEASE ORAL at 08:27

## 2019-07-29 RX ADMIN — CLOTRIMAZOLE SCH APPLIC: 1 CREAM TOPICAL at 17:36

## 2019-07-29 RX ADMIN — Medication SCH MG: at 08:28

## 2019-07-29 RX ADMIN — Medication SCH MG: at 17:35

## 2019-07-29 RX ADMIN — SODIUM CHLORIDE SCH MLS/HR: 9 INJECTION, SOLUTION INTRAVENOUS at 15:16

## 2019-07-29 RX ADMIN — ACETYLCYSTEINE SCH MG: 100 INHALANT RESPIRATORY (INHALATION) at 22:45

## 2019-07-29 RX ADMIN — Medication SCH MG: at 21:48

## 2019-07-29 RX ADMIN — PIPERACILLIN SODIUM AND TAZOBACTAM SODIUM SCH MLS/HR: .375; 3 INJECTION, POWDER, LYOPHILIZED, FOR SOLUTION INTRAVENOUS at 04:24

## 2019-07-29 RX ADMIN — Medication SCH MG: at 13:30

## 2019-07-29 RX ADMIN — ACETYLCYSTEINE SCH MG: 100 INHALANT RESPIRATORY (INHALATION) at 00:05

## 2019-07-29 RX ADMIN — PANTOPRAZOLE SODIUM SCH MG: 40 GRANULE, DELAYED RELEASE ORAL at 21:48

## 2019-07-29 RX ADMIN — Medication SCH ML: at 17:36

## 2019-07-29 NOTE — NUR
RN CLOSING NOTES

PT IS RESTING IN BED WITH CAREGIVER AT BEDSIDE. PT IS IN HIGH HADDDA'S POSITION FOR 
ASPIRATION PRECAUTIONS. PT DOES NOT APPEAR TO BE IN PAIN OR SOB AT PRESENT MOMENT. NO ACUTE 
CHANGES OCCURES. ALL NEEDS AND SAFETY PRECAUTION IN PLACE. BED IS LOCKED AND IN LOWEST 
POSITION WITH CALL LIGHT IN REACH. WILL ENDORSE CONTINUITY OF CARE TO NIGHT SHIFT RN.

## 2019-07-29 NOTE — NUR
RT

NO CPT AT THIS TIME DUE TO LACK OF EQUIPMENT

-------------------------------------------------------------------------------

Addendum: 07/29/19 at 0641 by MESSI BOWMAN RT

-------------------------------------------------------------------------------

Amended: Links added.

## 2019-07-29 NOTE — NUR
SPOKE WITH DAUGHTER OVER THE PHONE. DAUGHTER IS REQUESTING A UROLOGIST CONSULT AND FOR 
PHYSICAL THERAPY TO COME AND SIT MOTHER AT EDGE OF BED. MESSAGED  AND AWAITING ORDERS.

## 2019-07-29 NOTE — NUR
RN OPENING NOTES

RECEIVED REPORT FROM NIGHT SHIFT RN. PT IS RESTING IN BED ON 2L O2 VIA NC. CARE GIVER AT 
BEDSIDE. PT APPEARS TO BE IN NO RESPIRATORY DISTRESS OR PAIN AT PRESENT MOMENT. BED IS 
LOCKED AND IN LOWEST POSITION  IN HIGH HADDAD POSITION PER FAMILY REQUEST. WILL CONTINUE TO 
MONITOR.

## 2019-07-30 VITALS — DIASTOLIC BLOOD PRESSURE: 61 MMHG | SYSTOLIC BLOOD PRESSURE: 120 MMHG

## 2019-07-30 VITALS — DIASTOLIC BLOOD PRESSURE: 87 MMHG | SYSTOLIC BLOOD PRESSURE: 115 MMHG

## 2019-07-30 VITALS — DIASTOLIC BLOOD PRESSURE: 77 MMHG | SYSTOLIC BLOOD PRESSURE: 132 MMHG

## 2019-07-30 VITALS — SYSTOLIC BLOOD PRESSURE: 96 MMHG | DIASTOLIC BLOOD PRESSURE: 55 MMHG

## 2019-07-30 LAB
BASOPHILS # BLD AUTO: 0.1 /CMM (ref 0–0.2)
BASOPHILS NFR BLD AUTO: 1.4 % (ref 0–2)
BUN SERPL-MCNC: 7 MG/DL (ref 7–18)
CALCIUM SERPL-MCNC: 8.9 MG/DL (ref 8.5–10.1)
CHLORIDE SERPL-SCNC: 105 MMOL/L (ref 98–107)
CO2 SERPL-SCNC: 31 MMOL/L (ref 21–32)
CREAT SERPL-MCNC: 0.3 MG/DL (ref 0.6–1.3)
EOSINOPHIL NFR BLD AUTO: 1.8 % (ref 0–6)
GLUCOSE SERPL-MCNC: 87 MG/DL (ref 74–106)
HCT VFR BLD AUTO: 28 % (ref 33–45)
HGB BLD-MCNC: 8.8 G/DL (ref 11.5–14.8)
LYMPHOCYTES NFR BLD AUTO: 1.5 /CMM (ref 0.8–4.8)
LYMPHOCYTES NFR BLD AUTO: 24.7 % (ref 20–44)
MAGNESIUM SERPL-MCNC: 2 MG/DL (ref 1.8–2.4)
MCHC RBC AUTO-ENTMCNC: 32 G/DL (ref 31–36)
MCV RBC AUTO: 86 FL (ref 82–100)
MONOCYTES NFR BLD AUTO: 0.3 /CMM (ref 0.1–1.3)
MONOCYTES NFR BLD AUTO: 5.2 % (ref 2–12)
NEUTROPHILS # BLD AUTO: 4.1 /CMM (ref 1.8–8.9)
NEUTROPHILS NFR BLD AUTO: 66.9 % (ref 43–81)
PHOSPHATE SERPL-MCNC: 3.4 MG/DL (ref 2.5–4.9)
PLATELET # BLD AUTO: 256 /CMM (ref 150–450)
POTASSIUM SERPL-SCNC: 3.9 MMOL/L (ref 3.5–5.1)
RBC # BLD AUTO: 3.23 MIL/UL (ref 4–5.2)
SODIUM SERPL-SCNC: 141 MMOL/L (ref 136–145)
WBC NRBC COR # BLD AUTO: 6.1 K/UL (ref 4.3–11)

## 2019-07-30 RX ADMIN — CLOTRIMAZOLE SCH APPLIC: 1 CREAM TOPICAL at 08:11

## 2019-07-30 RX ADMIN — Medication SCH ML: at 08:06

## 2019-07-30 RX ADMIN — Medication SCH MG: at 08:06

## 2019-07-30 RX ADMIN — ACETYLCYSTEINE SCH MG: 100 INHALANT RESPIRATORY (INHALATION) at 14:11

## 2019-07-30 RX ADMIN — Medication SCH MG: at 22:00

## 2019-07-30 RX ADMIN — ENOXAPARIN SODIUM SCH MG: 40 INJECTION SUBCUTANEOUS at 00:49

## 2019-07-30 RX ADMIN — ACETYLCYSTEINE SCH MG: 100 INHALANT RESPIRATORY (INHALATION) at 07:31

## 2019-07-30 RX ADMIN — CLOTRIMAZOLE SCH APPLIC: 1 CREAM TOPICAL at 17:08

## 2019-07-30 RX ADMIN — Medication SCH MG: at 17:15

## 2019-07-30 RX ADMIN — Medication SCH MG: at 07:32

## 2019-07-30 RX ADMIN — FOLIC ACID SCH MG: 1 TABLET ORAL at 08:06

## 2019-07-30 RX ADMIN — Medication SCH MG: at 14:11

## 2019-07-30 RX ADMIN — Medication SCH MG: at 12:39

## 2019-07-30 RX ADMIN — LEVETIRACETAM SCH MG: 100 SOLUTION ORAL at 22:37

## 2019-07-30 RX ADMIN — PANTOPRAZOLE SODIUM SCH MG: 40 GRANULE, DELAYED RELEASE ORAL at 08:06

## 2019-07-30 RX ADMIN — NITROFURANTOIN MONOHYDRATE AND NITROFURANTOIN, MACROCRYSTALLINE SCH MG: 75; 25 CAPSULE ORAL at 22:37

## 2019-07-30 RX ADMIN — Medication SCH ML: at 16:27

## 2019-07-30 RX ADMIN — Medication SCH MG: at 20:12

## 2019-07-30 NOTE — NUR
MS/RN CLOSING NOTE



PATIENT IN BED IN STABLE CONDITION. NON VERBAL, OPEN EYES. NO SIGNS OF ACUTE DISTRESS. NO 
COMPLAIN OF PAIN OR DISCOMFORT. ON CONTACT ISOLATION SECONDARY TO ESBL POSITIVE FOR URINE. 
ALL NEEDS ATTENDED TO. 1:1 CAREGIVER AT BEDSIDE PROVIDED BY FAMILY. WILL ENDORSE TO NEXT 
SHIFT FOR CONTINUITY OF CARE.

## 2019-07-30 NOTE — NUR
RN NOTES,



PATIENT IN STABLE CONDITION WITH STABLE VS, NO SIGNIFICANT CHANGE IN CONDITION DURING THE 
NIGHT, CAREGIVER AT BEDSIDE, HOB ELEVATED AT ALL TIME,  WILL ENDORSE CONTINUITY OF CARE TO 
ONCOMING NURSE.

## 2019-07-30 NOTE — NUR
MS/RN OPENING NOTE



RECEIVED PATIENT IN BED IN STABLE CONDITION. A/O X 1, NON VERBAL, NO SIGNS OF ACUTE 
RESPIRATORY DISTRESS. NO COMPLAINTS OF PAIN OR DISCOMFORT. ON CONTACT ISOLATION FOR ESBL 
URINE. CAREGIVER AT BEDSIDE. ALL NEEDS ATTENDED TO. SAFETY MEASURES INPLACE, ASPIRATION 
PRECAUTION EMPHASIZE, CALL LIGHT WITHIN REACH.WILL CONTINUE TO MONITOR ACCORDINGLY.

## 2019-07-30 NOTE — NUR
RT NOTES



UNABLE TO PERFORM CPT BECAUSE PT WAS ASLEEP. NO DISTRESS NOTED. TX GIVEN WITH NO ADVERSE 
EFFECTS. SITTER AT BEDSIDE. EXPLAINED REASON AS TO WHY CPT NOT ADMINISTERED AND CAREGIVER 
AGREED. WILL CONT TO MONITOR PT FOR REST OF SHIFT.

## 2019-07-30 NOTE — NUR
MS/RN OPENING NOTE



PATIENT IN BED IN STABLE CONDITION. A/O X 1, NON VERBAL, NO SIGNS OF ACUTE DISTRESS. NO 
COMPLAIN OF PAIN OR DISCOMFORT. ON CONTACT ISOLATION FOR ESBL URINE. CAREGIVER AT BEDSIDE. 
ALL NEEDS ATTENDED TO. CALL LIGHT WITHIN REACH.WILL CONTINUE TO MONITOR TO ENSURE SAFETY.

## 2019-07-30 NOTE — NUR
MS/RN



SEEN BY DR ALFARO AND NOTIFIED FAMILY REQUESTING FOR UROLOGY CONSULT SECONDARY TO PATIENT 
HAVING FREQUENT UTI. PER DR ALFARO AT THIS MOMENT UROLOGY WON'T BE ABLE TO HELP UNTIL UTI IS 
CLEARED, CURRENTLY ON ATB NITROFURANTOIN. CAREGIVER AWARE.

## 2019-07-31 VITALS — DIASTOLIC BLOOD PRESSURE: 62 MMHG | SYSTOLIC BLOOD PRESSURE: 134 MMHG

## 2019-07-31 VITALS — SYSTOLIC BLOOD PRESSURE: 154 MMHG | DIASTOLIC BLOOD PRESSURE: 98 MMHG

## 2019-07-31 VITALS — SYSTOLIC BLOOD PRESSURE: 132 MMHG | DIASTOLIC BLOOD PRESSURE: 73 MMHG

## 2019-07-31 VITALS — SYSTOLIC BLOOD PRESSURE: 134 MMHG | DIASTOLIC BLOOD PRESSURE: 62 MMHG

## 2019-07-31 VITALS — SYSTOLIC BLOOD PRESSURE: 94 MMHG | DIASTOLIC BLOOD PRESSURE: 52 MMHG

## 2019-07-31 LAB
BASOPHILS # BLD AUTO: 0.1 /CMM (ref 0–0.2)
BASOPHILS NFR BLD AUTO: 1.3 % (ref 0–2)
BUN SERPL-MCNC: 7 MG/DL (ref 7–18)
CALCIUM SERPL-MCNC: 9.2 MG/DL (ref 8.5–10.1)
CHLORIDE SERPL-SCNC: 103 MMOL/L (ref 98–107)
CO2 SERPL-SCNC: 29 MMOL/L (ref 21–32)
CREAT SERPL-MCNC: 0.4 MG/DL (ref 0.6–1.3)
EOSINOPHIL NFR BLD AUTO: 2.9 % (ref 0–6)
GLUCOSE SERPL-MCNC: 93 MG/DL (ref 74–106)
HCT VFR BLD AUTO: 28 % (ref 33–45)
HGB BLD-MCNC: 9.2 G/DL (ref 11.5–14.8)
LYMPHOCYTES NFR BLD AUTO: 1.5 /CMM (ref 0.8–4.8)
LYMPHOCYTES NFR BLD AUTO: 27.9 % (ref 20–44)
MCHC RBC AUTO-ENTMCNC: 32 G/DL (ref 31–36)
MCV RBC AUTO: 86 FL (ref 82–100)
MONOCYTES NFR BLD AUTO: 0.3 /CMM (ref 0.1–1.3)
MONOCYTES NFR BLD AUTO: 5.3 % (ref 2–12)
NEUTROPHILS # BLD AUTO: 3.4 /CMM (ref 1.8–8.9)
NEUTROPHILS NFR BLD AUTO: 62.6 % (ref 43–81)
PLATELET # BLD AUTO: 262 /CMM (ref 150–450)
POTASSIUM SERPL-SCNC: 3.8 MMOL/L (ref 3.5–5.1)
RBC # BLD AUTO: 3.31 MIL/UL (ref 4–5.2)
SODIUM SERPL-SCNC: 140 MMOL/L (ref 136–145)
WBC NRBC COR # BLD AUTO: 5.4 K/UL (ref 4.3–11)

## 2019-07-31 RX ADMIN — Medication SCH ML: at 09:10

## 2019-07-31 RX ADMIN — CLOTRIMAZOLE SCH APPLIC: 1 CREAM TOPICAL at 17:39

## 2019-07-31 RX ADMIN — ACETYLCYSTEINE SCH MG: 100 INHALANT RESPIRATORY (INHALATION) at 00:33

## 2019-07-31 RX ADMIN — NITROFURANTOIN MONOHYDRATE AND NITROFURANTOIN, MACROCRYSTALLINE SCH MG: 75; 25 CAPSULE ORAL at 21:06

## 2019-07-31 RX ADMIN — ENOXAPARIN SODIUM SCH MG: 40 INJECTION SUBCUTANEOUS at 21:07

## 2019-07-31 RX ADMIN — PANTOPRAZOLE SODIUM SCH MG: 40 GRANULE, DELAYED RELEASE ORAL at 21:06

## 2019-07-31 RX ADMIN — LEVETIRACETAM SCH MG: 100 SOLUTION ORAL at 21:06

## 2019-07-31 RX ADMIN — Medication SCH MG: at 19:57

## 2019-07-31 RX ADMIN — Medication SCH MG: at 17:36

## 2019-07-31 RX ADMIN — Medication SCH ML: at 17:00

## 2019-07-31 RX ADMIN — ACETYLCYSTEINE SCH MG: 100 INHALANT RESPIRATORY (INHALATION) at 14:31

## 2019-07-31 RX ADMIN — Medication SCH MG: at 14:31

## 2019-07-31 RX ADMIN — Medication SCH MG: at 13:11

## 2019-07-31 RX ADMIN — Medication PRN OZ: at 10:46

## 2019-07-31 RX ADMIN — FOLIC ACID SCH MG: 1 TABLET ORAL at 09:04

## 2019-07-31 RX ADMIN — Medication SCH MG: at 21:05

## 2019-07-31 RX ADMIN — Medication SCH MG: at 00:33

## 2019-07-31 RX ADMIN — CLOTRIMAZOLE SCH APPLIC: 1 CREAM TOPICAL at 13:11

## 2019-07-31 RX ADMIN — Medication SCH MG: at 08:35

## 2019-07-31 RX ADMIN — ACETYLCYSTEINE SCH MG: 100 INHALANT RESPIRATORY (INHALATION) at 08:35

## 2019-07-31 RX ADMIN — PANTOPRAZOLE SODIUM SCH MG: 40 GRANULE, DELAYED RELEASE ORAL at 10:46

## 2019-07-31 RX ADMIN — Medication SCH MG: at 09:04

## 2019-07-31 NOTE — NUR
MS RN CLOSING NOTES

PT IS LYING ON BED WITH THE CAREGIVER IS AT BEDSIDE.ALL MEDS ARE GIVEN.NO SOB AND ACUTE 
DISTRESS NOTED.WILL ENDORSE TO NIGHT SHIFT RN FOR STACEY.

## 2019-07-31 NOTE — NUR
patient in from OR per bed sleeping, arousable. ivf resumed, with orders to resume all 
orders, vital signs checked and recorded

-------------------------------------------------------------------------------

Addendum: 07/31/19 at 1443 by VALERIE DC RN

-------------------------------------------------------------------------------

not for this patient mistakenly placed in this chart, clarification of kodi

## 2019-07-31 NOTE — NUR
MS RN OPENING NOTES



RECEIVED PATIENT IN BED, AWAKE, NONVERBAL. ON OXYGEN 2L VIA NC 2LPM, NO SOB AND ACUTE 
DISTRESS NOTED. CAREGIVER IS AT BEDSIDE. NO WORKING IV NOTED, WILL INSERT NEW IV. SAFETY 
MEASURES IN PLACE; BED IS IN LOW POSITION AND LOCKED, CALL LIGHT IS WITHIN REACH, SIDE RAILS 
UP X2, HOB ELEVATED FOR ASPIRATION PRECAUTIONS, ON ISOLATION PRECAUTION FOR ESBL URINE. WILL 
CONTINUE TO MONITOR PT.

## 2019-07-31 NOTE — NUR
MS RN NOTES

PT STACEY GIVEN BY IBIS PADILLA.RECEIVED PT LYING ON BED WITH NC 2LPM O2 CONTINUOUSLY.NO SOB AND 
ACUTE DISTRESS NOTED.CAREGIVER IS AT BEDSIDE.NO SOB AND ACUTE DISTRESS NOTED.IV LINE IS ON 
RIGHT HAND G22,SL AND LEFT WRIST G24,SL.IV SITE IS CLEAN,DRY AND INTACT.NO INFILTRATION 
NOTED.BED IS IN LOW POSITION AND LOCKED,CALL LIGHT IS WITHIN REACH.WILL CONTINUE TO MONITOR 
THE PT CLOSELY.

## 2019-07-31 NOTE — NUR
RN NOTES



ALL NEEDS ATTENDED AND MET, ABLE TO REST AND SLEPT COMFORTABLY, KEPT CLEAN AND DRY, NO SIGNS 
OF PAIN AT THIS TIME, CAREGIVER AT BEDSIDE. WILL ENDORSE TO AM NURSE FOR CONTINUITY OF CARE.

## 2019-08-01 VITALS — SYSTOLIC BLOOD PRESSURE: 131 MMHG | DIASTOLIC BLOOD PRESSURE: 66 MMHG

## 2019-08-01 VITALS — SYSTOLIC BLOOD PRESSURE: 108 MMHG | DIASTOLIC BLOOD PRESSURE: 58 MMHG

## 2019-08-01 VITALS — DIASTOLIC BLOOD PRESSURE: 61 MMHG | SYSTOLIC BLOOD PRESSURE: 105 MMHG

## 2019-08-01 VITALS — SYSTOLIC BLOOD PRESSURE: 131 MMHG | DIASTOLIC BLOOD PRESSURE: 59 MMHG

## 2019-08-01 RX ADMIN — ACETYLCYSTEINE SCH MG: 100 INHALANT RESPIRATORY (INHALATION) at 14:59

## 2019-08-01 RX ADMIN — PANTOPRAZOLE SODIUM SCH MG: 40 GRANULE, DELAYED RELEASE ORAL at 09:57

## 2019-08-01 RX ADMIN — Medication SCH MG: at 19:26

## 2019-08-01 RX ADMIN — Medication SCH MG: at 09:55

## 2019-08-01 RX ADMIN — ENOXAPARIN SODIUM SCH MG: 40 INJECTION SUBCUTANEOUS at 21:19

## 2019-08-01 RX ADMIN — Medication SCH MG: at 01:36

## 2019-08-01 RX ADMIN — NITROFURANTOIN MONOHYDRATE AND NITROFURANTOIN, MACROCRYSTALLINE SCH MG: 75; 25 CAPSULE ORAL at 21:17

## 2019-08-01 RX ADMIN — CLOTRIMAZOLE SCH APPLIC: 1 CREAM TOPICAL at 16:59

## 2019-08-01 RX ADMIN — Medication SCH MG: at 13:29

## 2019-08-01 RX ADMIN — Medication SCH MG: at 16:58

## 2019-08-01 RX ADMIN — Medication SCH ML: at 16:58

## 2019-08-01 RX ADMIN — ACETYLCYSTEINE SCH MG: 100 INHALANT RESPIRATORY (INHALATION) at 07:53

## 2019-08-01 RX ADMIN — PANTOPRAZOLE SODIUM SCH MG: 40 GRANULE, DELAYED RELEASE ORAL at 21:16

## 2019-08-01 RX ADMIN — Medication PRN MG: at 10:23

## 2019-08-01 RX ADMIN — CLOTRIMAZOLE SCH APPLIC: 1 CREAM TOPICAL at 09:56

## 2019-08-01 RX ADMIN — ACETYLCYSTEINE SCH MG: 100 INHALANT RESPIRATORY (INHALATION) at 01:37

## 2019-08-01 RX ADMIN — Medication SCH MG: at 21:16

## 2019-08-01 RX ADMIN — Medication SCH MG: at 07:58

## 2019-08-01 RX ADMIN — Medication SCH ML: at 09:57

## 2019-08-01 RX ADMIN — FOLIC ACID SCH MG: 1 TABLET ORAL at 09:56

## 2019-08-01 RX ADMIN — Medication SCH MG: at 14:59

## 2019-08-01 RX ADMIN — LEVETIRACETAM SCH MG: 100 SOLUTION ORAL at 21:17

## 2019-08-01 NOTE — NUR
MS/RN NOTES



RECEIVED PT. LYING IN BED. PT. IS AWAKE, ALERT AND ORIENTED TO SELF, NON-VERBAL. BREATHING 
EVEN AND UNLABORED ON 2LPM O2 VIA NC. NO SOB, RESPIRATORY DISTRESS OR S/S OF PAIN NOTED AT 
THIS TIME. PT. WITH RIGHT HAND 22 GAUGE IV SALINE LOCK PRESENT, PATENT AND INTACT. PT. 
FAMILY MEMBER PRESENT AT BEDSIDE. ISOLATION, ASPIRATION AND SAFETY PRECAUTIONS IMPLEMENTED 
AND IN PLACE. BED LOCKED AND IN LOWEST POSITION, SIDE RAILS UP X3, BED ALARM ON, CALL LIGHT 
WITHIN REACH, WILL CONTINUE TO MONITOR.

## 2019-08-01 NOTE — NUR
MS RN NOTE



PATIENT TOLERATED THE NIGHT WELL. NO S/S OF ACUTE DISTRESS. CARE GIVEN AS ORDERED. RN WILL 
ENDORSE TO AM POC FOR STACEY.

## 2019-08-01 NOTE — NUR
MS RN CLOSING NOTE



PATIENT IN BED AWAKE WITH FAMILY AT BEDSIDE.  NO SOB OR ACUTE DISTRESS NOTED. BREATHING EVEN 
AND UNLABORED. R HAND #22 IV INTACT AND PATENT. SKIN COLOR WNL. ALL WOUND CARE COMPLETED.  
PATEINT TURNED [id number]. ALL NURSING CARE RENDERED AS ORDERED.  CALL LIGHT WITHIN REACH. 
SAFETY PRECAUTIONS IN PLACE. CARE ENDORSED TO NIGHT SHIFT RN.

## 2019-08-01 NOTE — NUR
MS RN NOTE



PATIENT REPORT GIVEN BEDSIDE, FAMILY IN ROOM. PATIENT SLEEPING COMFORTABLY NO S/S OF ACUTE 
DISTRESS BREATHING EVEN AND UNLABORED SKIN COLOR WNL. RN WILL CONTINUE TO MONITOR. CALL 
LIGHT WITHIN REACH. SAFETY PRECAUTIONS IN PLACE.

## 2019-08-01 NOTE — NUR
MS RN OPENING NOTE



RECEIVED PATIENT WITH REPORT GIVEN BEDSIDE. PATIENT IN BED ASLEEP WITH CAREGIVER AT BEDSIDE. 
NO SOB OR ACUTE DISTRESS NOTED. BREATHING EVEN AND UNLABORED. R HAND #22 IV INTACT AND 
PATENT. SKIN COLOR WNL. RN WILL CONTINUE TO MONITOR. CALL LIGHT WITHIN REACH. SAFETY 
PRECAUTIONS IN PLACE.

## 2019-08-02 VITALS — SYSTOLIC BLOOD PRESSURE: 93 MMHG | DIASTOLIC BLOOD PRESSURE: 56 MMHG

## 2019-08-02 VITALS — SYSTOLIC BLOOD PRESSURE: 117 MMHG | DIASTOLIC BLOOD PRESSURE: 63 MMHG

## 2019-08-02 VITALS — SYSTOLIC BLOOD PRESSURE: 118 MMHG | DIASTOLIC BLOOD PRESSURE: 61 MMHG

## 2019-08-02 VITALS — SYSTOLIC BLOOD PRESSURE: 113 MMHG | DIASTOLIC BLOOD PRESSURE: 71 MMHG

## 2019-08-02 RX ADMIN — Medication SCH MG: at 19:54

## 2019-08-02 RX ADMIN — Medication SCH MG: at 00:50

## 2019-08-02 RX ADMIN — LEVETIRACETAM SCH MG: 100 SOLUTION ORAL at 21:40

## 2019-08-02 RX ADMIN — CLOTRIMAZOLE SCH APPLIC: 1 CREAM TOPICAL at 08:29

## 2019-08-02 RX ADMIN — Medication SCH ML: at 08:30

## 2019-08-02 RX ADMIN — FOLIC ACID SCH MG: 1 TABLET ORAL at 08:28

## 2019-08-02 RX ADMIN — Medication SCH ML: at 17:56

## 2019-08-02 RX ADMIN — ACETYLCYSTEINE SCH MG: 100 INHALANT RESPIRATORY (INHALATION) at 07:57

## 2019-08-02 RX ADMIN — ACETYLCYSTEINE SCH MG: 100 INHALANT RESPIRATORY (INHALATION) at 15:30

## 2019-08-02 RX ADMIN — ENOXAPARIN SODIUM SCH MG: 40 INJECTION SUBCUTANEOUS at 21:40

## 2019-08-02 RX ADMIN — NITROFURANTOIN MONOHYDRATE AND NITROFURANTOIN, MACROCRYSTALLINE SCH MG: 75; 25 CAPSULE ORAL at 21:40

## 2019-08-02 RX ADMIN — Medication SCH MG: at 07:57

## 2019-08-02 RX ADMIN — Medication SCH MG: at 21:40

## 2019-08-02 RX ADMIN — Medication SCH MG: at 08:28

## 2019-08-02 RX ADMIN — Medication SCH MG: at 17:56

## 2019-08-02 RX ADMIN — Medication SCH MG: at 13:37

## 2019-08-02 RX ADMIN — ACETYLCYSTEINE SCH MG: 100 INHALANT RESPIRATORY (INHALATION) at 23:33

## 2019-08-02 RX ADMIN — PANTOPRAZOLE SODIUM SCH MG: 40 GRANULE, DELAYED RELEASE ORAL at 08:28

## 2019-08-02 RX ADMIN — ACETYLCYSTEINE SCH MG: 100 INHALANT RESPIRATORY (INHALATION) at 00:50

## 2019-08-02 RX ADMIN — Medication SCH MG: at 12:35

## 2019-08-02 RX ADMIN — PANTOPRAZOLE SODIUM SCH MG: 40 GRANULE, DELAYED RELEASE ORAL at 21:40

## 2019-08-02 RX ADMIN — CLOTRIMAZOLE SCH APPLIC: 1 CREAM TOPICAL at 17:56

## 2019-08-02 NOTE — NUR
MS RN OPENING NOTE



RECEIVED PATIENT WITH REPORT GIVEN BEDSIDE. PATIENT IN BED ASLEEP WITH CAREGIVER AT BEDSIDE. 
NO SOB OR ACUTE DISTRESS NOTED. BREATHING EVEN AND UNLABORED. R HAND #22 IV INTACT AND 
PATENT. SKIN COLOR WNL. BED IS LOW AND IN LOCKED POSITION. CALL LIGHT WITHIN REACH. 
SRX4.SAFETY PRECAUTIONS IN PLACE.WILL CONTINUE TO MONITOR.

## 2019-08-02 NOTE — NUR
MS RN NOTE

KENDY ALFARO MADE AWARE ABOUT PATIENT CONDITION ,MORE LETHARGIC AND SLEEPY.VITAL SIGNS 
STABLE.OPEN EYES .KENDY ALFARO SEEN THE PATIENT AND ANSWERED ALL THE QUESTIONS.WILL CONTINUE TO 
MONITOR.

## 2019-08-02 NOTE — NUR
MS/RN NOTES



PT. IS LYING IN BED RESTING. BREATHING EVEN AND UNLABORED ON 2LPM O2 VIA NC. NO SOB, 
RESPIRATORY DISTRESS OR S/S OF PAIN NOTED AT THIS TIME. PT. WITH RIGHT HAND 22 GAUGE IV 
SALINE LOCK PRESENT, PATENT AND INTACT. PT. CAREGIVER PRESENT AT BEDSIDE. ALL PT. NEEDS MET. 
PT. OFFLOADED, TURNED AND REPOSITIONED Q2H AND AS NEEDED. ISOLATION, ASPIRATION AND SAFETY 
PRECAUTIONS IMPLEMENTED AND IN PLACE. BED LOCKED AND IN LOWEST POSITION, SIDE RAILS UP X3, 
BED ALARM ON, CALL LIGHT WITHIN REACH, WILL ENDORSE TO DAYSHIFT NURSE FOR CONTINUITY OF 
CARE.

## 2019-08-02 NOTE — NUR
RN MS OPENING NOTES

RECEIVED PATIENT IN BED AWAKE , NON VERBAL EYES OPEN, RESPIRATIONS EVEN AND UNLABORED WITH 
EQUAL RISE AND FALL OF CHEST, APPEARS TO BE COMFORTABLE AT THIS TIME, NO FACIAL GRIMACING, 
NO RESPIRATORY DISTRESS PRESENT,ON 2 L VIA NC 02 SAT 96%, HEAD OF BED ELEVATED FOR 
ASPIRATION PRECAUTION PRECAUTIONS, IV SITE TO RIGHT HAND #22 G INTACT AND PATENT, SL, NO 
REDNESS, NO INFILTRATION  PRESENT. ORIENTED TO STAFF AND CALL LIGHT AND KEPT WITHIN REACH, 
SAFETY PRECAUTIONS IN PLACE, FAMILY AT BEDSIDE, FALL PRECAUTIONS RENDERED ALL NEEDS ATTENDED 
AT THIS TIME.

## 2019-08-02 NOTE — NUR
MS RN NOTE

SEEN BY ,UPDATED ABOUT PATIENT CONDITION,SLEEPINESS AND LETHARGIC.SPOKE TO 
DAUGHTER AND ANSWERED ALL THE QUESTIONS.WILL CONTINUE TO MONITOR.

## 2019-08-03 VITALS — DIASTOLIC BLOOD PRESSURE: 71 MMHG | SYSTOLIC BLOOD PRESSURE: 123 MMHG

## 2019-08-03 VITALS — SYSTOLIC BLOOD PRESSURE: 99 MMHG | DIASTOLIC BLOOD PRESSURE: 58 MMHG

## 2019-08-03 VITALS — DIASTOLIC BLOOD PRESSURE: 58 MMHG | SYSTOLIC BLOOD PRESSURE: 101 MMHG

## 2019-08-03 VITALS — DIASTOLIC BLOOD PRESSURE: 54 MMHG | SYSTOLIC BLOOD PRESSURE: 94 MMHG

## 2019-08-03 RX ADMIN — Medication SCH ML: at 16:43

## 2019-08-03 RX ADMIN — Medication SCH MG: at 20:18

## 2019-08-03 RX ADMIN — Medication SCH MG: at 21:50

## 2019-08-03 RX ADMIN — Medication SCH MG: at 10:09

## 2019-08-03 RX ADMIN — ENOXAPARIN SODIUM SCH MG: 40 INJECTION SUBCUTANEOUS at 21:51

## 2019-08-03 RX ADMIN — PANTOPRAZOLE SODIUM SCH MG: 40 GRANULE, DELAYED RELEASE ORAL at 21:50

## 2019-08-03 RX ADMIN — Medication SCH MG: at 08:20

## 2019-08-03 RX ADMIN — Medication SCH MG: at 01:56

## 2019-08-03 RX ADMIN — Medication SCH MG: at 14:05

## 2019-08-03 RX ADMIN — ACETYLCYSTEINE SCH MG: 100 INHALANT RESPIRATORY (INHALATION) at 08:20

## 2019-08-03 RX ADMIN — Medication SCH ML: at 09:00

## 2019-08-03 RX ADMIN — ACETYLCYSTEINE SCH MG: 100 INHALANT RESPIRATORY (INHALATION) at 14:05

## 2019-08-03 RX ADMIN — PANTOPRAZOLE SODIUM SCH MG: 40 GRANULE, DELAYED RELEASE ORAL at 10:10

## 2019-08-03 RX ADMIN — LEVETIRACETAM SCH MG: 100 SOLUTION ORAL at 21:50

## 2019-08-03 RX ADMIN — Medication SCH MG: at 16:43

## 2019-08-03 RX ADMIN — CLOTRIMAZOLE SCH APPLIC: 1 CREAM TOPICAL at 10:09

## 2019-08-03 RX ADMIN — CLOTRIMAZOLE SCH APPLIC: 1 CREAM TOPICAL at 16:43

## 2019-08-03 RX ADMIN — FOLIC ACID SCH MG: 1 TABLET ORAL at 10:10

## 2019-08-03 RX ADMIN — NITROFURANTOIN MONOHYDRATE AND NITROFURANTOIN, MACROCRYSTALLINE SCH MG: 75; 25 CAPSULE ORAL at 21:50

## 2019-08-03 RX ADMIN — Medication SCH MG: at 13:04

## 2019-08-03 NOTE — NUR
PATIENTS SON WAS UPSET DUE TO CNA NOT COMING AT EXACTLY 4:00 PM. I EXPLAINED TO THE FAMILY 
MEMBER THAT SOMETHING COULD OCCUR AND CAUSE A DELAY. THE CNA IS SOMETIMES UNABLE TO COME TO 
A ROOM AT EXACT TIME. HE STATED THAT THE PATIENT WASN'T CHANGED AT 12 NOON, HOWEVER THE 
PATIENT WAS CHANGED BECAUSE I ASSISTED WITH THE CNA. THE SON STATED THAT SHE SHOULD BE 
CHANGED EVERY 2 HOURS ON THE CLOCK TO PREVENT UTI, WE OFFERED TO CHANGE THE PATIENT, HOWEVER 
SHE WAS COMPLETELY DRY WITH NO BM OR URINE.

## 2019-08-03 NOTE — NUR
RN MS CLOSING NOTES

PATIENT IN BED AWAKE , NON VERBAL EYES OPEN, RESPIRATIONS EVEN AND UNLABORED WITH EQUAL RISE 
AND FALL OF CHEST, APPEARS TO BE COMFORTABLE AT THIS TIME, NO FACIAL GRIMACING, NO 
RESPIRATORY DISTRESS PRESENT,ON 2 L VIA NC 02 SAT 96-99%, HEAD OF BED ELEVATED FOR 
ASPIRATION PRECAUTION PRECAUTIONS, SUCTIONED AS NEEDED, SMALL THIN SECRETIONS NOTED WHITE,  
IV SITE TO RIGHT HAND #22 G INTACT AND PATENT, SL, NO REDNESS, NO INFILTRATION  PRESENT.  
CALL LIGHT  KEPT WITHIN REACH, SAFETY PRECAUTIONS IN PLACE, CAREGIVER AT BEDSIDE,  WOUND 
TREATMENT PROVIDED AS ORDERED, MEPILEX INTACT TO SACRAL AREA AND RIGHT ELBOW, NO FURTHER 
CHANGES NOTED TO SKIN , PATIENT FREQUENTLY REPOSITIONED TO OFFLOAD SACRAL AREA, PATIENT KEPT 
CLEAN AND DRY, TOLERATED SMALL SIPS OF NECTAR THICKEN LIQUIDS,FALL PRECAUTIONS RENDERED ALL 
NEEDS ATTENDED AT THIS TIME WILL CONTINUE TO ENDORSE TO NEXT SHIFT, PATIENT LEFT 
COMFORTABLE.

## 2019-08-03 NOTE — NUR
MS RN NOTE

DAUGHTER WANT  TO BE THE DOCTOR FOR THE PATIENT , MADE ROUNDS HOWEVER 
THE DAUGHTER WASN'T HERE. NO FAMILY IN THE ROOM

## 2019-08-03 NOTE — NUR
PATIENT APPEARS TO BE IN NO DISTRESS, HOB ELEVATED ABOVE 45 DEGREES, NO PAIN NOTED, 
DRESSINGS CHANGED, MEDICATION GIVEN WITH APPLESAUCE, PATIENT APPEARS TO GO IN AND OUT OF 
SLEEP.

## 2019-08-04 VITALS — DIASTOLIC BLOOD PRESSURE: 72 MMHG | SYSTOLIC BLOOD PRESSURE: 128 MMHG

## 2019-08-04 VITALS — SYSTOLIC BLOOD PRESSURE: 91 MMHG | DIASTOLIC BLOOD PRESSURE: 53 MMHG

## 2019-08-04 VITALS — DIASTOLIC BLOOD PRESSURE: 67 MMHG | SYSTOLIC BLOOD PRESSURE: 109 MMHG

## 2019-08-04 VITALS — SYSTOLIC BLOOD PRESSURE: 109 MMHG | DIASTOLIC BLOOD PRESSURE: 67 MMHG

## 2019-08-04 VITALS — DIASTOLIC BLOOD PRESSURE: 66 MMHG | SYSTOLIC BLOOD PRESSURE: 120 MMHG

## 2019-08-04 RX ADMIN — Medication SCH MG: at 21:15

## 2019-08-04 RX ADMIN — Medication SCH MG: at 16:25

## 2019-08-04 RX ADMIN — PANTOPRAZOLE SODIUM SCH MG: 40 GRANULE, DELAYED RELEASE ORAL at 09:58

## 2019-08-04 RX ADMIN — ENOXAPARIN SODIUM SCH MG: 40 INJECTION SUBCUTANEOUS at 21:14

## 2019-08-04 RX ADMIN — Medication SCH ML: at 16:26

## 2019-08-04 RX ADMIN — Medication SCH MG: at 13:18

## 2019-08-04 RX ADMIN — Medication SCH MG: at 14:08

## 2019-08-04 RX ADMIN — Medication SCH ML: at 09:59

## 2019-08-04 RX ADMIN — Medication SCH MG: at 09:58

## 2019-08-04 RX ADMIN — CLOTRIMAZOLE SCH APPLIC: 1 CREAM TOPICAL at 10:15

## 2019-08-04 RX ADMIN — ACETYLCYSTEINE SCH MG: 100 INHALANT RESPIRATORY (INHALATION) at 22:50

## 2019-08-04 RX ADMIN — FOLIC ACID SCH MG: 1 TABLET ORAL at 09:59

## 2019-08-04 RX ADMIN — LEVETIRACETAM SCH MG: 100 SOLUTION ORAL at 21:15

## 2019-08-04 RX ADMIN — Medication SCH MG: at 07:08

## 2019-08-04 RX ADMIN — ACETYLCYSTEINE SCH MG: 100 INHALANT RESPIRATORY (INHALATION) at 07:08

## 2019-08-04 RX ADMIN — ACETYLCYSTEINE SCH MG: 100 INHALANT RESPIRATORY (INHALATION) at 00:29

## 2019-08-04 RX ADMIN — Medication PRN OZ: at 16:29

## 2019-08-04 RX ADMIN — Medication SCH MG: at 00:30

## 2019-08-04 RX ADMIN — NITROFURANTOIN MONOHYDRATE AND NITROFURANTOIN, MACROCRYSTALLINE SCH MG: 75; 25 CAPSULE ORAL at 21:15

## 2019-08-04 RX ADMIN — CLOTRIMAZOLE SCH APPLIC: 1 CREAM TOPICAL at 16:29

## 2019-08-04 RX ADMIN — PANTOPRAZOLE SODIUM SCH MG: 40 GRANULE, DELAYED RELEASE ORAL at 21:15

## 2019-08-04 RX ADMIN — ACETYLCYSTEINE SCH MG: 100 INHALANT RESPIRATORY (INHALATION) at 00:28

## 2019-08-04 RX ADMIN — Medication SCH MG: at 19:31

## 2019-08-04 NOTE — NUR
RN OPENING NOTE



RECEIVED PATIENT WITH REPORT GIVEN BEDSIDE. PATIENT IN BED ASLEEP, EASILY AROUSABLE. ALERT, 
NON VERBAL.  NO SOB, NOT IN ANY FORM OF DISTRESS. BREATHING EVEN AND UNLABORED. NO S/S OF 
PAIN OR DISCOMFORT AT THIS TIME. R HAND #22  INTACT AND PATENT. KEPT PATIENT SAFE AND 
COMFORTABLE. BED IN LOW AND IN LOCKED POSITION. CALL LIGHT WITHIN REACH. SIDERAILS UP,SAFETY 
PRECAUTIONS IN PLACE.WILL CONTINUE TO MONITOR ACCORDINGLY.

## 2019-08-04 NOTE — NUR
RN MS PM CLOSING NOTES

PATIENT IN BED SEEN WITH EYES CLOSED IN NO APPARENT DISTRESS. RESPIRATIONS EVEN AND 
UNLABORED WITH EQUAL RISE AND FALL OF CHEST, ON 2 L VIA NC 02 SAT  WITH LAST SATURATION 97% 
AT 4AM. HEAD OF BED ELEVATED AT 45 DEGREES,  IV SITE TO RIGHT HAND #22 G INTACT AND PATENT, 
SL, NO REDNESS, NO INFILTRATION  PRESENT.  CALL LIGHT  KEPT WITHIN REACH, SAFETY PRECAUTIONS 
IN PLACE WOUND TREATMENT PROVIDED AS ORDERED, MEPILEX INTACT TO SACRAL AREA AND RIGHT ELBOW, 
NO FURTHER CHANGES NOTED TO SKIN , PATIENT  REPOSITIONED Q2H  PER PROTOCOL OFFLOADING TO 
SACRAL AREA, PATIENT KEPT CLEAN AND DRY, BED DOWN AND LOCKED.

## 2019-08-04 NOTE — NUR
RN OPEN NOTES



RECEIVED PATIENT AWAKE IN BED WITH FAMILY AT BEDSIDE. NON-VERBAL. NO SIGNS OF DISTRESS OR 
DISCOMFORT. BREATHING EVEN AND UNLABORED. ON 2LPM O2 VIA NC. IV ACCESS IN R HAND, PATENT AND 
INTACT, NO SIGNS OF REDNESS OR INFILTRATION. BED IN LOW LOCKED POSITION WITH SIDE RAILS X2. 
CALL LIGHT WITHIN REACH. WILL CONTINUE TO MONITOR.

## 2019-08-05 VITALS — SYSTOLIC BLOOD PRESSURE: 108 MMHG | DIASTOLIC BLOOD PRESSURE: 52 MMHG

## 2019-08-05 VITALS — SYSTOLIC BLOOD PRESSURE: 106 MMHG | DIASTOLIC BLOOD PRESSURE: 57 MMHG

## 2019-08-05 VITALS — SYSTOLIC BLOOD PRESSURE: 113 MMHG | DIASTOLIC BLOOD PRESSURE: 57 MMHG

## 2019-08-05 VITALS — DIASTOLIC BLOOD PRESSURE: 57 MMHG | SYSTOLIC BLOOD PRESSURE: 113 MMHG

## 2019-08-05 RX ADMIN — PANTOPRAZOLE SODIUM SCH MG: 40 GRANULE, DELAYED RELEASE ORAL at 08:19

## 2019-08-05 RX ADMIN — Medication SCH MG: at 08:18

## 2019-08-05 RX ADMIN — Medication SCH MG: at 16:25

## 2019-08-05 RX ADMIN — Medication SCH ML: at 08:18

## 2019-08-05 RX ADMIN — Medication SCH MG: at 13:17

## 2019-08-05 RX ADMIN — Medication SCH MG: at 12:50

## 2019-08-05 RX ADMIN — ACETYLCYSTEINE SCH MG: 100 INHALANT RESPIRATORY (INHALATION) at 07:55

## 2019-08-05 RX ADMIN — FOLIC ACID SCH MG: 1 TABLET ORAL at 08:18

## 2019-08-05 RX ADMIN — Medication SCH MG: at 07:55

## 2019-08-05 RX ADMIN — Medication SCH MG: at 02:05

## 2019-08-05 RX ADMIN — CLOTRIMAZOLE SCH APPLIC: 1 CREAM TOPICAL at 08:19

## 2019-08-05 RX ADMIN — ACETYLCYSTEINE SCH MG: 100 INHALANT RESPIRATORY (INHALATION) at 13:17

## 2019-08-05 RX ADMIN — Medication SCH ML: at 16:26

## 2019-08-05 RX ADMIN — CLOTRIMAZOLE SCH APPLIC: 1 CREAM TOPICAL at 16:26

## 2019-08-05 NOTE — NUR
MS BAUMANN DISCHARGE NOTES

Jaqueline arrived to  patient. Report given. Patient being transported in stable 
condition. No signs and symptoms of distress. Personal caregiver going with [employer]. IV 
access removed with cath tip intact and no bleeding noted. Discharge papers and instructions 
given to EMTs. Instructions for continuation of care provided to Marilyn from [facility]. Discharge papers signed by charge nurse and I. No available family members at bedside 
to sign. Son aware of transfer time. Belongings reviewed and signed by CNA. All belongings 
will be taken by personal caregiver. All questions and concerns addressed. Safety measures 
implemented until patient leaves facility. 

-------------------------------------------------------------------------------

Addendum: 08/05/19 at 1837 by LIZZ BRANDT RN

-------------------------------------------------------------------------------

Transported with oxygen therapy noted. Patient just left the unit.

-------------------------------------------------------------------------------

Addendum: 08/05/19 at 1900 by LIZZ BRANDT RN

-------------------------------------------------------------------------------

Son came to  more personal stuff. Provided son with copies of discharge papers.

## 2019-08-05 NOTE — NUR
MS RN OPENING NOTES

Report received at bedside. patient received in bed, sleeping comfortably, easily aroused. 
On contact isolation for ESBL. Not in any type of distress. No facial grimacing or moaning 
noted. CNA arrived. Safety measures in place. Will continue to monitor and assess patient.

## 2019-08-05 NOTE — NUR
MS RN DISCHARGE NOTES

Called Micheal (son) and made aware of  time to be transported to [facility]. Son 
agreed and acknowledged. Witnessed by Charge Nurse (S.H.)

## 2019-08-05 NOTE — NUR
RN CLOSING NOTES



PATIENT RESTING IN BED WITH CAREGIVER AT BEDSIDE. NON-VERBAL. NO SIGNS OF DISTRESS OR 
DISCOMFORT. BREATHING EVEN AND UNLABORED. ON 2LPM O2 VIA NC. IV ACCESS IN R HAND, PATENT AND 
INTACT, NO SIGNS OF REDNESS OR INFILTRATION. ALL NEEDS MET. NO SIGNIFICANT CHANGES THROUGH 
THE NIGHT. PATIENT REPOSITIONED Q2H AND PRN. BED IN LOW LOCKED POSITION WITH SIDE RAILS X2. 
CALL LIGHT WITHIN REACH. WILL ENDORSE TO AM SHIFT FOR STACEY.

## 2019-08-27 ENCOUNTER — OFFICE (OUTPATIENT)
Dept: URBAN - METROPOLITAN AREA CLINIC 66 | Facility: CLINIC | Age: 79
End: 2019-08-27

## 2019-08-27 VITALS — HEIGHT: 60 IN

## 2019-08-27 DIAGNOSIS — K44.9 HIATAL HERNIA: ICD-10-CM

## 2019-08-27 PROCEDURE — 99244 OFF/OP CNSLTJ NEW/EST MOD 40: CPT | Performed by: INTERNAL MEDICINE

## 2019-08-27 NOTE — SERVICEHPINOTES
This is a female who has advanced dementia, on wheelchair, non-verbal and non-communicative. She also has history of epilepsy. Has been brought in by daughter who is providing history and expressing concerns.  According to daughter, patient has been diagnosed with Alzheimer's for the past 5-6 years. She has been in rehab for the past 2 years. She can not give any history.BRPatient has had episodes of recurrent UTI as well as recurrent aspiration pneumonia. 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 BRHas been on protonix 40 mg BID x one month, famotidine 20 mg daily, and tums daily. Patient's daughter.   CT abdomen/ pelvis 7/24/19: Large hiatal hernia containing majority of stomach.

## 2019-09-27 ENCOUNTER — HOSPITAL ENCOUNTER (EMERGENCY)
Dept: HOSPITAL 54 - ER | Age: 79
Discharge: HOME | End: 2019-09-27
Payer: MEDICARE

## 2019-09-27 VITALS — HEIGHT: 64 IN | BODY MASS INDEX: 22.88 KG/M2 | WEIGHT: 134 LBS

## 2019-09-27 VITALS — SYSTOLIC BLOOD PRESSURE: 123 MMHG | DIASTOLIC BLOOD PRESSURE: 81 MMHG

## 2019-09-27 DIAGNOSIS — Z88.1: ICD-10-CM

## 2019-09-27 DIAGNOSIS — S32.009A: Primary | ICD-10-CM

## 2019-09-27 DIAGNOSIS — F02.80: ICD-10-CM

## 2019-09-27 DIAGNOSIS — Z88.9: ICD-10-CM

## 2019-09-27 DIAGNOSIS — Z88.8: ICD-10-CM

## 2019-09-27 DIAGNOSIS — G30.9: ICD-10-CM

## 2019-09-27 DIAGNOSIS — R40.4: ICD-10-CM

## 2019-09-27 DIAGNOSIS — Z88.2: ICD-10-CM
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
## 2019-09-27 DIAGNOSIS — Z87.440: ICD-10-CM
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
## 2019-09-27 DIAGNOSIS — G40.909: ICD-10-CM

## 2019-09-27 LAB
ALBUMIN SERPL BCP-MCNC: 3.9 G/DL (ref 3.4–5)
ALP SERPL-CCNC: 120 U/L (ref 46–116)
ALT SERPL W P-5'-P-CCNC: 26 U/L (ref 12–78)
AST SERPL W P-5'-P-CCNC: 30 U/L (ref 15–37)
BASOPHILS # BLD AUTO: 0.1 /CMM (ref 0–0.2)
BASOPHILS NFR BLD AUTO: 1 % (ref 0–2)
BILIRUB DIRECT SERPL-MCNC: 0.1 MG/DL (ref 0–0.2)
BILIRUB SERPL-MCNC: 0.3 MG/DL (ref 0.2–1)
BUN SERPL-MCNC: 13 MG/DL (ref 7–18)
CALCIUM SERPL-MCNC: 9.8 MG/DL (ref 8.5–10.1)
CHLORIDE SERPL-SCNC: 102 MMOL/L (ref 98–107)
CO2 SERPL-SCNC: 27 MMOL/L (ref 21–32)
CREAT SERPL-MCNC: 0.7 MG/DL (ref 0.6–1.3)
EOSINOPHIL NFR BLD AUTO: 0.4 % (ref 0–6)
GLUCOSE SERPL-MCNC: 112 MG/DL (ref 74–106)
HCT VFR BLD AUTO: 45 % (ref 33–45)
HGB BLD-MCNC: 14.7 G/DL (ref 11.5–14.8)
LYMPHOCYTES NFR BLD AUTO: 1.2 /CMM (ref 0.8–4.8)
LYMPHOCYTES NFR BLD AUTO: 13.9 % (ref 20–44)
MCHC RBC AUTO-ENTMCNC: 33 G/DL (ref 31–36)
MCV RBC AUTO: 91 FL (ref 82–100)
MONOCYTES NFR BLD AUTO: 0.3 /CMM (ref 0.1–1.3)
MONOCYTES NFR BLD AUTO: 3.5 % (ref 2–12)
NEUTROPHILS # BLD AUTO: 7.1 /CMM (ref 1.8–8.9)
NEUTROPHILS NFR BLD AUTO: 81.2 % (ref 43–81)
PH UR STRIP: 7 [PH] (ref 5–8)
PLATELET # BLD AUTO: 169 /CMM (ref 150–450)
POTASSIUM SERPL-SCNC: 4.7 MMOL/L (ref 3.5–5.1)
PROT SERPL-MCNC: 8.3 G/DL (ref 6.4–8.2)
RBC # BLD AUTO: 4.91 MIL/UL (ref 4–5.2)
SODIUM SERPL-SCNC: 137 MMOL/L (ref 136–145)
UROBILINOGEN UR STRIP-MCNC: 0.2 EU/DL
WBC NRBC COR # BLD AUTO: 8.7 K/UL (ref 4.3–11)

## 2019-09-27 PROCEDURE — 70450 CT HEAD/BRAIN W/O DYE: CPT

## 2019-09-27 PROCEDURE — 85730 THROMBOPLASTIN TIME PARTIAL: CPT

## 2019-09-27 PROCEDURE — 96365 THER/PROPH/DIAG IV INF INIT: CPT

## 2019-09-27 PROCEDURE — 81001 URINALYSIS AUTO W/SCOPE: CPT

## 2019-09-27 PROCEDURE — 93005 ELECTROCARDIOGRAM TRACING: CPT

## 2019-09-27 PROCEDURE — 99284 EMERGENCY DEPT VISIT MOD MDM: CPT

## 2019-09-27 PROCEDURE — 85025 COMPLETE CBC W/AUTO DIFF WBC: CPT

## 2019-09-27 PROCEDURE — 80076 HEPATIC FUNCTION PANEL: CPT

## 2019-09-27 PROCEDURE — 87086 URINE CULTURE/COLONY COUNT: CPT

## 2019-09-27 PROCEDURE — 84484 ASSAY OF TROPONIN QUANT: CPT

## 2019-09-27 PROCEDURE — 71045 X-RAY EXAM CHEST 1 VIEW: CPT

## 2019-09-27 PROCEDURE — 80048 BASIC METABOLIC PNL TOTAL CA: CPT

## 2019-09-27 PROCEDURE — 72131 CT LUMBAR SPINE W/O DYE: CPT

## 2019-09-27 PROCEDURE — 36415 COLL VENOUS BLD VENIPUNCTURE: CPT

## 2019-09-27 NOTE — NUR
CALLED [facility]. SPOKE WITH STAFF NURSE-ALMA, 
CONFIRMED PATIENT IS ON BACTRIM DS 1 TAB PO BID X5 DAYS FOR UTI, STARTED ON 
09-24-19.

## 2019-09-27 NOTE — NUR
ARRANGED BLS TRANSPORT WITH CON: [id number], TRIP NUMBER:[id number] SPOKE TO 
[facility] FROM DISPATCH.

## 2020-01-07 ENCOUNTER — HOSPITAL ENCOUNTER (INPATIENT)
Dept: HOSPITAL 54 - ER | Age: 80
LOS: 12 days | Discharge: NURSING, REHAB OR LONG-TERM CARE | DRG: 194 | End: 2020-01-19
Attending: FAMILY MEDICINE | Admitting: INTERNAL MEDICINE
Payer: MEDICARE

## 2020-01-07 VITALS — DIASTOLIC BLOOD PRESSURE: 52 MMHG | SYSTOLIC BLOOD PRESSURE: 111 MMHG

## 2020-01-07 VITALS — DIASTOLIC BLOOD PRESSURE: 48 MMHG | SYSTOLIC BLOOD PRESSURE: 105 MMHG

## 2020-01-07 VITALS — BODY MASS INDEX: 24.54 KG/M2 | WEIGHT: 125 LBS | HEIGHT: 60 IN

## 2020-01-07 DIAGNOSIS — K44.9: ICD-10-CM

## 2020-01-07 DIAGNOSIS — E44.0: ICD-10-CM

## 2020-01-07 DIAGNOSIS — I50.32: ICD-10-CM

## 2020-01-07 DIAGNOSIS — K21.9: ICD-10-CM

## 2020-01-07 DIAGNOSIS — G20: ICD-10-CM

## 2020-01-07 DIAGNOSIS — J98.11: ICD-10-CM

## 2020-01-07 DIAGNOSIS — K57.30: ICD-10-CM

## 2020-01-07 DIAGNOSIS — Z87.440: ICD-10-CM

## 2020-01-07 DIAGNOSIS — G30.9: ICD-10-CM

## 2020-01-07 DIAGNOSIS — G40.909: ICD-10-CM

## 2020-01-07 DIAGNOSIS — I11.0: ICD-10-CM

## 2020-01-07 DIAGNOSIS — Z88.0: ICD-10-CM

## 2020-01-07 DIAGNOSIS — Z88.2: ICD-10-CM

## 2020-01-07 DIAGNOSIS — D50.9: ICD-10-CM

## 2020-01-07 DIAGNOSIS — F02.80: ICD-10-CM

## 2020-01-07 DIAGNOSIS — K59.00: ICD-10-CM

## 2020-01-07 DIAGNOSIS — I82.432: ICD-10-CM

## 2020-01-07 DIAGNOSIS — E88.09: ICD-10-CM

## 2020-01-07 DIAGNOSIS — J15.9: Primary | ICD-10-CM

## 2020-01-07 LAB
ALBUMIN SERPL BCP-MCNC: 3.2 G/DL (ref 3.4–5)
ALP SERPL-CCNC: 94 U/L (ref 46–116)
ALT SERPL W P-5'-P-CCNC: 24 U/L (ref 12–78)
APPEARANCE UR: (no result)
AST SERPL W P-5'-P-CCNC: 18 U/L (ref 15–37)
BASOPHILS # BLD AUTO: 0.1 /CMM (ref 0–0.2)
BASOPHILS NFR BLD AUTO: 1.1 % (ref 0–2)
BILIRUB DIRECT SERPL-MCNC: 0.2 MG/DL (ref 0–0.2)
BILIRUB SERPL-MCNC: 0.8 MG/DL (ref 0.2–1)
BUN SERPL-MCNC: 11 MG/DL (ref 7–18)
CALCIUM SERPL-MCNC: 9 MG/DL (ref 8.5–10.1)
CHLORIDE SERPL-SCNC: 103 MMOL/L (ref 98–107)
CO2 SERPL-SCNC: 26 MMOL/L (ref 21–32)
CREAT SERPL-MCNC: 0.6 MG/DL (ref 0.6–1.3)
EOSINOPHIL NFR BLD AUTO: 3.7 % (ref 0–6)
GLUCOSE SERPL-MCNC: 103 MG/DL (ref 74–106)
HCT VFR BLD AUTO: 42 % (ref 33–45)
HGB BLD-MCNC: 14 G/DL (ref 11.5–14.8)
LYMPHOCYTES NFR BLD AUTO: 1 /CMM (ref 0.8–4.8)
LYMPHOCYTES NFR BLD AUTO: 13.1 % (ref 20–44)
MCHC RBC AUTO-ENTMCNC: 34 G/DL (ref 31–36)
MCV RBC AUTO: 96 FL (ref 82–100)
MONOCYTES NFR BLD AUTO: 0.2 /CMM (ref 0.1–1.3)
MONOCYTES NFR BLD AUTO: 3 % (ref 2–12)
NEUTROPHILS # BLD AUTO: 6.3 /CMM (ref 1.8–8.9)
NEUTROPHILS NFR BLD AUTO: 79.1 % (ref 43–81)
PH UR STRIP: >9 [PH] (ref 5–8)
PLATELET # BLD AUTO: 103 /CMM (ref 150–450)
POTASSIUM SERPL-SCNC: 4.5 MMOL/L (ref 3.5–5.1)
PROT SERPL-MCNC: 7.5 G/DL (ref 6.4–8.2)
RBC # BLD AUTO: 4.34 MIL/UL (ref 4–5.2)
RBC #/AREA URNS HPF: (no result) /HPF (ref 0–2)
SODIUM SERPL-SCNC: 137 MMOL/L (ref 136–145)
UROBILINOGEN UR STRIP-MCNC: 0.2 EU/DL
WBC #/AREA URNS HPF: (no result) /HPF (ref 0–3)
WBC NRBC COR # BLD AUTO: 7.9 K/UL (ref 4.3–11)

## 2020-01-07 PROCEDURE — A9540 TC99M MAA: HCPCS

## 2020-01-07 PROCEDURE — A9567 TECHNETIUM TC-99M AEROSOL: HCPCS

## 2020-01-07 PROCEDURE — A4216 STERILE WATER/SALINE, 10 ML: HCPCS

## 2020-01-07 PROCEDURE — G0378 HOSPITAL OBSERVATION PER HR: HCPCS

## 2020-01-07 RX ADMIN — Medication SCH ML: at 20:03

## 2020-01-07 RX ADMIN — Medication SCH EACH: at 20:03

## 2020-01-07 RX ADMIN — LEVETIRACETAM SCH MG: 100 SOLUTION ORAL at 21:02

## 2020-01-07 RX ADMIN — DEXTROSE AND SODIUM CHLORIDE PRN MLS/HR: 5; 450 INJECTION, SOLUTION INTRAVENOUS at 20:39

## 2020-01-07 NOTE — NUR
SENT BY PMD FOR WEAKNESS EVAL. PATIENT A/OX1, OPENS EYES, NON-VERBAL. FAMILY 
AT BEDSIDE. ATTACHED TO THE CARDIAC MONITOR.

## 2020-01-07 NOTE — NUR
TELE1/RN     REPORT FROM ER



REPORT GIVEN BY ER NURSE MACKENZIE. PT TO BE ADMITTED FOR UTI UNDER THE CARE OF DR. YANEZ. 
AWAITING FOR PT'S ARRIVAL.

## 2020-01-07 NOTE — NUR
TELE1/RN     ADMIT - ROOM 109



PT ARRIVED VIA GURNEY ACCOMPANIED BY ER NURSE MACKENZIE AND TRANSPORT TECH WITH ON GOING IV 
INFUSION MERREM. PT TRANSFERRED WITH FULL ASSIST TO HOSPITAL BED. PT A/O X 1, OPEN EYES, NO 
GRIMACING NOTED. PLACED ON 2L O2 VIA N/C SATURATING @ 100%, RESPIRATIONS EVEN & UNLABORED, 
LUNG SOUNDS CLEAR. TELE BOX PLACED, SINUS RHYTHM. IV SITE PATENT WITH NO S/S OF INFECTION. 
VS TAKEN, WITHIN. ADMITTING PROTOCOLS UNDERWAY. CL WITHIN REACHED AND SAFETY MAINTAINED.  ON 
GOING MONITORING.

## 2020-01-08 VITALS — DIASTOLIC BLOOD PRESSURE: 68 MMHG | SYSTOLIC BLOOD PRESSURE: 121 MMHG

## 2020-01-08 VITALS — DIASTOLIC BLOOD PRESSURE: 76 MMHG | SYSTOLIC BLOOD PRESSURE: 142 MMHG

## 2020-01-08 VITALS — DIASTOLIC BLOOD PRESSURE: 69 MMHG | SYSTOLIC BLOOD PRESSURE: 117 MMHG

## 2020-01-08 VITALS — SYSTOLIC BLOOD PRESSURE: 120 MMHG | DIASTOLIC BLOOD PRESSURE: 81 MMHG

## 2020-01-08 VITALS — DIASTOLIC BLOOD PRESSURE: 66 MMHG | SYSTOLIC BLOOD PRESSURE: 120 MMHG

## 2020-01-08 LAB
BASOPHILS # BLD AUTO: 0.1 /CMM (ref 0–0.2)
BASOPHILS NFR BLD AUTO: 1.2 % (ref 0–2)
BUN SERPL-MCNC: 8 MG/DL (ref 7–18)
CALCIUM SERPL-MCNC: 8.4 MG/DL (ref 8.5–10.1)
CHLORIDE SERPL-SCNC: 102 MMOL/L (ref 98–107)
CO2 SERPL-SCNC: 27 MMOL/L (ref 21–32)
CREAT SERPL-MCNC: 0.3 MG/DL (ref 0.6–1.3)
EOSINOPHIL NFR BLD AUTO: 5.2 % (ref 0–6)
GLUCOSE SERPL-MCNC: 99 MG/DL (ref 74–106)
HCT VFR BLD AUTO: 39 % (ref 33–45)
HGB BLD-MCNC: 12.9 G/DL (ref 11.5–14.8)
LYMPHOCYTES NFR BLD AUTO: 1 /CMM (ref 0.8–4.8)
LYMPHOCYTES NFR BLD AUTO: 15.1 % (ref 20–44)
MAGNESIUM SERPL-MCNC: 2 MG/DL (ref 1.8–2.4)
MCHC RBC AUTO-ENTMCNC: 34 G/DL (ref 31–36)
MCV RBC AUTO: 96 FL (ref 82–100)
MONOCYTES NFR BLD AUTO: 0.3 /CMM (ref 0.1–1.3)
MONOCYTES NFR BLD AUTO: 3.7 % (ref 2–12)
NEUTROPHILS # BLD AUTO: 5.2 /CMM (ref 1.8–8.9)
NEUTROPHILS NFR BLD AUTO: 74.8 % (ref 43–81)
PHOSPHATE SERPL-MCNC: 2.5 MG/DL (ref 2.5–4.9)
PLATELET # BLD AUTO: 83 /CMM (ref 150–450)
POTASSIUM SERPL-SCNC: 4.5 MMOL/L (ref 3.5–5.1)
RBC # BLD AUTO: 4.01 MIL/UL (ref 4–5.2)
SODIUM SERPL-SCNC: 136 MMOL/L (ref 136–145)
WBC NRBC COR # BLD AUTO: 6.9 K/UL (ref 4.3–11)

## 2020-01-08 RX ADMIN — CALCIUM CARBONATE-CHOLECALCIFEROL TAB 250 MG-125 UNIT SCH UDTAB: 250-125 TAB at 16:37

## 2020-01-08 RX ADMIN — PANTOPRAZOLE SODIUM SCH MG: 40 TABLET, DELAYED RELEASE ORAL at 08:13

## 2020-01-08 RX ADMIN — Medication SCH MG: at 08:12

## 2020-01-08 RX ADMIN — DEXTROSE AND SODIUM CHLORIDE PRN MLS/HR: 5; 450 INJECTION, SOLUTION INTRAVENOUS at 11:08

## 2020-01-08 RX ADMIN — Medication SCH EACH: at 16:37

## 2020-01-08 RX ADMIN — FOLIC ACID SCH MG: 1 TABLET ORAL at 08:13

## 2020-01-08 RX ADMIN — Medication SCH DROP: at 08:13

## 2020-01-08 RX ADMIN — Medication SCH TAB: at 08:13

## 2020-01-08 RX ADMIN — CALCIUM CARBONATE-CHOLECALCIFEROL TAB 250 MG-125 UNIT SCH UDTAB: 250-125 TAB at 11:02

## 2020-01-08 RX ADMIN — VITAMIN D, TAB 1000IU (100/BT) SCH UNIT: 25 TAB at 08:12

## 2020-01-08 RX ADMIN — OXYCODONE HYDROCHLORIDE AND ACETAMINOPHEN SCH MG: 500 TABLET ORAL at 08:13

## 2020-01-08 RX ADMIN — Medication SCH EACH: at 08:13

## 2020-01-08 RX ADMIN — Medication SCH MG: at 16:37

## 2020-01-08 RX ADMIN — Medication SCH DROP: at 16:40

## 2020-01-08 RX ADMIN — LEVETIRACETAM SCH MG: 100 SOLUTION ORAL at 21:22

## 2020-01-08 RX ADMIN — FERROUS SULFATE TAB 325 MG (65 MG ELEMENTAL FE) SCH MG: 325 (65 FE) TAB at 08:12

## 2020-01-08 RX ADMIN — Medication SCH TAB: at 08:12

## 2020-01-08 NOTE — NUR
RN CLOSING NOTE

PT IN BED AWAKE IN SEMI HADDAD'S POSITION. ALERT AND ORIENTED TO NAME ONLY. ON 2 L OF O2 VIA 
NC AND TOLERATING WELL. NO INDICATIONS OF PAIN OR DISTRESS. CAREGIVER AT BEDSIDE. SAFETY 
MEASURES IN PLACE. CALL LIGHT WITHIN REACH, ENDORSED TO MORNING SHIFT FOR CONTINUITY OF 
CARE.

## 2020-01-08 NOTE — NUR
CHANGED OF SHIFT REPORT

Patient in bed, awake. Unable to assess speech, not talking, Farsi per report. Oxygen 2LPM 
via NC, tolerating well. IVF infusing. SR/ST in the Tele monitor. Fall precaution 
maintained, caregiver at bedside.

## 2020-01-08 NOTE — NUR
TELE RN OPENING NOTES



RECEIVED PT RESTING IN BED. CAREGIVER BY BEDSIDE. PT IN STABLE CONDITION. PT IS AOX 1. ON 
NASAL CANULA RECEIVING 2L OF OXYGEN, SATURATING AT 95%. PATIENT IS ON TELE MONITOR, SR/ST HR 
AT 103BPM. RESPIRATIONS ARE EVEN AND UNLABORED, LUNG SOUNDS ARE DIMINISHED WITH CRACKLING 
HEARD IN THE LOWER LOBE. IV SITE IS PATENT WITH NO S/S OF INFECTION. RUNNING D5 .45% NS @ 
75MLS/HR. FLUSHED WELL. SAFETY MAINTAINED. CALL LIGHT WITHIN REACH. WILL CONTINUE 
MONITORING.

## 2020-01-08 NOTE — NUR
RN ROUNDING NOTES 



DAUGHTER AT BEDSIDE. UPDATED HER REGARDING THE PATIENT CONDITION. WAS ASKED TO REQUEST 
PULMONARY CONSULT, MESSAGED DR HESTER, WAITING FOR RESPONSE. PATIENT IS IN STABLE 
CONDITION, ALL NEEDS MET, WILL CONTINUE OT MONITOR .

## 2020-01-09 VITALS — SYSTOLIC BLOOD PRESSURE: 144 MMHG | DIASTOLIC BLOOD PRESSURE: 89 MMHG

## 2020-01-09 VITALS — SYSTOLIC BLOOD PRESSURE: 128 MMHG | DIASTOLIC BLOOD PRESSURE: 67 MMHG

## 2020-01-09 VITALS — SYSTOLIC BLOOD PRESSURE: 117 MMHG | DIASTOLIC BLOOD PRESSURE: 69 MMHG

## 2020-01-09 VITALS — DIASTOLIC BLOOD PRESSURE: 64 MMHG | SYSTOLIC BLOOD PRESSURE: 134 MMHG

## 2020-01-09 LAB
BASOPHILS # BLD AUTO: 0.1 /CMM (ref 0–0.2)
BASOPHILS NFR BLD AUTO: 1.3 % (ref 0–2)
BUN SERPL-MCNC: 7 MG/DL (ref 7–18)
CALCIUM SERPL-MCNC: 8.2 MG/DL (ref 8.5–10.1)
CHLORIDE SERPL-SCNC: 104 MMOL/L (ref 98–107)
CO2 SERPL-SCNC: 24 MMOL/L (ref 21–32)
CREAT SERPL-MCNC: 0.2 MG/DL (ref 0.6–1.3)
EOSINOPHIL NFR BLD AUTO: 4.5 % (ref 0–6)
EOSINOPHIL NFR BLD MANUAL: 2 % (ref 0–4)
GLUCOSE SERPL-MCNC: 103 MG/DL (ref 74–106)
HCT VFR BLD AUTO: 35 % (ref 33–45)
HGB BLD-MCNC: 12 G/DL (ref 11.5–14.8)
LYMPHOCYTES NFR BLD AUTO: 1.3 /CMM (ref 0.8–4.8)
LYMPHOCYTES NFR BLD AUTO: 21.3 % (ref 20–44)
LYMPHOCYTES NFR BLD MANUAL: 25 % (ref 16–48)
MAGNESIUM SERPL-MCNC: 1.9 MG/DL (ref 1.8–2.4)
MCHC RBC AUTO-ENTMCNC: 34 G/DL (ref 31–36)
MCV RBC AUTO: 95 FL (ref 82–100)
MONOCYTES NFR BLD AUTO: 0.3 /CMM (ref 0.1–1.3)
MONOCYTES NFR BLD AUTO: 4.7 % (ref 2–12)
MONOCYTES NFR BLD MANUAL: 7 % (ref 0–11)
NEUTROPHILS # BLD AUTO: 4.1 /CMM (ref 1.8–8.9)
NEUTROPHILS NFR BLD AUTO: 68.2 % (ref 43–81)
NEUTS SEG NFR BLD MANUAL: 66 % (ref 42–76)
PHOSPHATE SERPL-MCNC: 1.9 MG/DL (ref 2.5–4.9)
PLATELET # BLD AUTO: 90 /CMM (ref 150–450)
POTASSIUM SERPL-SCNC: 3.4 MMOL/L (ref 3.5–5.1)
RBC # BLD AUTO: 3.68 MIL/UL (ref 4–5.2)
SODIUM SERPL-SCNC: 139 MMOL/L (ref 136–145)
WBC NRBC COR # BLD AUTO: 6 K/UL (ref 4.3–11)

## 2020-01-09 RX ADMIN — Medication SCH MG: at 08:04

## 2020-01-09 RX ADMIN — OXYCODONE HYDROCHLORIDE AND ACETAMINOPHEN SCH MG: 500 TABLET ORAL at 08:04

## 2020-01-09 RX ADMIN — Medication SCH EACH: at 17:17

## 2020-01-09 RX ADMIN — CALCIUM CARBONATE-CHOLECALCIFEROL TAB 250 MG-125 UNIT SCH UDTAB: 250-125 TAB at 21:30

## 2020-01-09 RX ADMIN — VITAMIN D, TAB 1000IU (100/BT) SCH UNIT: 25 TAB at 08:04

## 2020-01-09 RX ADMIN — Medication SCH EACH: at 08:04

## 2020-01-09 RX ADMIN — FOLIC ACID SCH MG: 1 TABLET ORAL at 08:05

## 2020-01-09 RX ADMIN — Medication SCH DROP: at 17:18

## 2020-01-09 RX ADMIN — LEVETIRACETAM SCH MG: 100 SOLUTION ORAL at 21:31

## 2020-01-09 RX ADMIN — CALCIUM CARBONATE-CHOLECALCIFEROL TAB 250 MG-125 UNIT SCH UDTAB: 250-125 TAB at 08:06

## 2020-01-09 RX ADMIN — PANTOPRAZOLE SODIUM SCH MG: 40 TABLET, DELAYED RELEASE ORAL at 08:04

## 2020-01-09 RX ADMIN — DEXTROSE AND SODIUM CHLORIDE PRN MLS/HR: 5; 450 INJECTION, SOLUTION INTRAVENOUS at 00:52

## 2020-01-09 RX ADMIN — FERROUS SULFATE TAB 325 MG (65 MG ELEMENTAL FE) SCH MG: 325 (65 FE) TAB at 08:04

## 2020-01-09 RX ADMIN — Medication SCH TAB: at 08:04

## 2020-01-09 RX ADMIN — Medication SCH MG: at 17:17

## 2020-01-09 RX ADMIN — Medication SCH TAB: at 08:05

## 2020-01-09 RX ADMIN — Medication SCH DROP: at 11:34

## 2020-01-09 RX ADMIN — DEXTROSE AND SODIUM CHLORIDE PRN MLS/HR: 5; 450 INJECTION, SOLUTION INTRAVENOUS at 15:25

## 2020-01-09 NOTE — NUR
END OF SHIFT REPORT

Patient in bed, no acute events overnight, afebrile. IVF infusing, remains on supplemental 
Oxygen 2LPM via NC, tolerating well. Sinus rhythm in the Tele monitor. Incontinent, voided 
urine x3 during the shift. 06:20 Bladder scan showed 499ml, straight cath as ordered, 
obtained 450ml urine. Fall/Aspiration/Skin precaution maintained.

## 2020-01-09 NOTE — NUR
RN NOTES:

ASPIRATION PRECAUTION INITIATED. ALL DUE MEDS ADMINISTERED. NO ASPIRATION NOTED. KEPT HOB 45 
DEGREES DURING ADMINISTRATION OF MEDICATION, INSTRUCTED TO KEEP PT UPRIGHT AT LEAST 30MINS 
AFTER DRINKING MEDICATION. CAREGIVER UNDERSTAND.

## 2020-01-09 NOTE — NUR
RN NOTES:

RECEIVED CALL FROM PT'S DAUGHTER ERICA, GIVEN UPDATE REGARDING PT AND PLAN OF CARE. ALL 
QUESTIONS/CONCERNS ANSWERED BASED ON RECENT LABS AND PLAN OF CARE FROM MD NOTES.

## 2020-01-09 NOTE — NUR
rn assessment:

received late report from registry rn [place]. met with pt and pt's daughter and caregiver 
in the room. pt is farsi only, alert to self, on 2l oxygen via nc respirations even and 
unlabored. noted iv infiltrated and leaking, stopped ivf, will change iv access. per report 
last bladder scan and straight cath performed at 1800, with 300ml output removed. order is 
for q8hrs, next schedule will be 0200am. pt on pureed diet, feeder. education provided to 
family regarding keeping hob 35 degrees at all time, to prevent aspirations. ble offloaded 
on  pillows. on tele monitoring, sinus tachycardia with elevated t wave hr 103. appears calm 
and comfortable, no facial grimace noted. discussed plan of care tonight. safety precautions 
for fall initiated, call light in reach, will continue monitoring pt.

## 2020-01-09 NOTE — NUR
TELE RN OPENING NOTE



RECEIVED REPORT FROM [facility] SHIFT NURSE. PT ASLEEP IN BED, ON 02 VIA NC 2L/MIN, SATURATING 
WELL, RESPIRATIONS EVEN AND UNLABORED, NO SIGNS OF RESPIRATORY DISTRESS NOTED. 

ON TELE MONITOR SINUS RHYTHM. IV SITE ON RIGHT AC G20 INTACT, PATENT, D5 1/2 NS INFUSING AT 
75CC/HR, NO SIGNS OF INFILTRATION NOTED. 

BED IN LOW POSITION, LOCKED, CALL LIGHT WITHIN REACH, CAREGIVER BY BEDSIDE.

## 2020-01-10 VITALS — DIASTOLIC BLOOD PRESSURE: 52 MMHG | SYSTOLIC BLOOD PRESSURE: 100 MMHG

## 2020-01-10 VITALS — SYSTOLIC BLOOD PRESSURE: 126 MMHG | DIASTOLIC BLOOD PRESSURE: 76 MMHG

## 2020-01-10 VITALS — SYSTOLIC BLOOD PRESSURE: 134 MMHG | DIASTOLIC BLOOD PRESSURE: 72 MMHG

## 2020-01-10 VITALS — DIASTOLIC BLOOD PRESSURE: 84 MMHG | SYSTOLIC BLOOD PRESSURE: 132 MMHG

## 2020-01-10 VITALS — SYSTOLIC BLOOD PRESSURE: 143 MMHG | DIASTOLIC BLOOD PRESSURE: 66 MMHG

## 2020-01-10 VITALS — DIASTOLIC BLOOD PRESSURE: 63 MMHG | SYSTOLIC BLOOD PRESSURE: 111 MMHG

## 2020-01-10 LAB
BASOPHILS # BLD AUTO: 0.1 /CMM (ref 0–0.2)
BASOPHILS NFR BLD AUTO: 1 % (ref 0–2)
BUN SERPL-MCNC: 8 MG/DL (ref 7–18)
CALCIUM SERPL-MCNC: 8.6 MG/DL (ref 8.5–10.1)
CHLORIDE SERPL-SCNC: 102 MMOL/L (ref 98–107)
CO2 SERPL-SCNC: 25 MMOL/L (ref 21–32)
CREAT SERPL-MCNC: 0.3 MG/DL (ref 0.6–1.3)
EOSINOPHIL NFR BLD AUTO: 3.1 % (ref 0–6)
GLUCOSE SERPL-MCNC: 113 MG/DL (ref 74–106)
HCT VFR BLD AUTO: 35 % (ref 33–45)
HGB BLD-MCNC: 12 G/DL (ref 11.5–14.8)
LYMPHOCYTES NFR BLD AUTO: 1.1 /CMM (ref 0.8–4.8)
LYMPHOCYTES NFR BLD AUTO: 17.4 % (ref 20–44)
MAGNESIUM SERPL-MCNC: 1.9 MG/DL (ref 1.8–2.4)
MCHC RBC AUTO-ENTMCNC: 34 G/DL (ref 31–36)
MCV RBC AUTO: 95 FL (ref 82–100)
MONOCYTES NFR BLD AUTO: 0.4 /CMM (ref 0.1–1.3)
MONOCYTES NFR BLD AUTO: 5.7 % (ref 2–12)
NEUTROPHILS # BLD AUTO: 4.7 /CMM (ref 1.8–8.9)
NEUTROPHILS NFR BLD AUTO: 72.8 % (ref 43–81)
PHOSPHATE SERPL-MCNC: 2.3 MG/DL (ref 2.5–4.9)
PLATELET # BLD AUTO: 114 /CMM (ref 150–450)
POTASSIUM SERPL-SCNC: 3.8 MMOL/L (ref 3.5–5.1)
RBC # BLD AUTO: 3.72 MIL/UL (ref 4–5.2)
SODIUM SERPL-SCNC: 136 MMOL/L (ref 136–145)
WBC NRBC COR # BLD AUTO: 6.4 K/UL (ref 4.3–11)

## 2020-01-10 RX ADMIN — Medication SCH TAB: at 10:26

## 2020-01-10 RX ADMIN — POLYETHYLENE GLYCOL 3350 PRN GM: 17 POWDER, FOR SOLUTION ORAL at 11:17

## 2020-01-10 RX ADMIN — DEXTROSE AND SODIUM CHLORIDE PRN MLS/HR: 5; 450 INJECTION, SOLUTION INTRAVENOUS at 17:47

## 2020-01-10 RX ADMIN — Medication SCH EACH: at 10:26

## 2020-01-10 RX ADMIN — FOLIC ACID SCH MG: 1 TABLET ORAL at 10:26

## 2020-01-10 RX ADMIN — Medication SCH DROP: at 10:26

## 2020-01-10 RX ADMIN — DEXTROSE AND SODIUM CHLORIDE PRN MLS/HR: 5; 450 INJECTION, SOLUTION INTRAVENOUS at 03:20

## 2020-01-10 RX ADMIN — PANTOPRAZOLE SODIUM SCH MG: 40 TABLET, DELAYED RELEASE ORAL at 08:01

## 2020-01-10 RX ADMIN — Medication SCH MG: at 17:24

## 2020-01-10 RX ADMIN — CALCIUM CARBONATE-CHOLECALCIFEROL TAB 250 MG-125 UNIT SCH UDTAB: 250-125 TAB at 10:26

## 2020-01-10 RX ADMIN — Medication SCH EACH: at 17:24

## 2020-01-10 RX ADMIN — Medication SCH DROP: at 17:20

## 2020-01-10 RX ADMIN — RIVAROXABAN SCH MG: 15 TABLET, FILM COATED ORAL at 17:25

## 2020-01-10 RX ADMIN — CALCIUM CARBONATE-CHOLECALCIFEROL TAB 250 MG-125 UNIT SCH UDTAB: 250-125 TAB at 17:24

## 2020-01-10 RX ADMIN — VITAMIN D, TAB 1000IU (100/BT) SCH UNIT: 25 TAB at 10:26

## 2020-01-10 RX ADMIN — Medication SCH TAB: at 10:27

## 2020-01-10 RX ADMIN — FERROUS SULFATE TAB 325 MG (65 MG ELEMENTAL FE) SCH MG: 325 (65 FE) TAB at 10:26

## 2020-01-10 RX ADMIN — LEVETIRACETAM SCH MG: 100 SOLUTION ORAL at 21:03

## 2020-01-10 RX ADMIN — Medication SCH MG: at 10:26

## 2020-01-10 RX ADMIN — OXYCODONE HYDROCHLORIDE AND ACETAMINOPHEN SCH MG: 500 TABLET ORAL at 10:26

## 2020-01-10 NOTE — NUR
CLOSING

PT HAD ULTRA SOUND DONE GIVEN ALL MEDICATIONS SPOKE WITH FAMILY AND TEXTED MD YANEZ ABOUT 
BLOOD CULTURE FOR GRAM + COCCI MD TEXTED BACK AT 1800 AWARE OF RESULTS. WILL GIVEN RN REPORT 
TO PM SHIFT FOR CONTINUITY OF CARE

## 2020-01-10 NOTE — NUR
EOSS:

PT REMAINS ON 2L OXYGEN RESPIRATIONS EVEN AND UNLABORED, IV ACCESS REMANS PATENT AND 
FLUSHING WELL, INFUSING WITH IV AS ORDERED. NO S/S OF IV INFILTRATION NOTED. NO FACIAL 
GRIMACE NOTED. APPEARS CALM AND COMFORTABLE. KEPT ON ASPIRATION PRECAUTIONS. BLE KEPT 
OFFLOADED ON PILLOWS. LAST BLADDER SCAN AND STRAIGHT CATH PERFORMED AT 0320AM, 380ML OUTPUT. 
PT'S DAUGHTER REQUESTING FOR MD TO SEE THE ESTROGEN VAGINAL RING, TO EVALUATE FOR INFECTION. 
WILL RELAY TO DAY RN. PT REMAINS AFEBRILE. ON SINUS RHYTHM HR 85. VS REMAINS STABLE, NEEDS 
ATTENDED. SAFETY PRECAUTIONS FOR FALL REMAINS ENGAGED, CALL LIGHT IN REACH, WILL ENDORSE TO 
DAY RN FOR CONTINUITY OF CARE.

## 2020-01-10 NOTE — NUR
INITIAL

RECEIVED PT ON BED ASLEEP, ON 02 VIA NC 2L/MIN, SATURATING WELL, RESPIRATIONS EVEN AND 
UNLABORED, NO SIGNS OF RESPIRATORY DISTRESS NOTED. 

ON TELE MONITOR SINUS RHYTHM-84. IV SITE ON RIGHT AC G20 INTACT, PATENT, D5 1/2 NS INFUSING 
AT 75CC/HR, NO SIGNS OF INFILTRATION NOTED. 

BED IN LOW POSITION, LOCKED, CALL LIGHT WITHIN REACH, CAREGIVER BY BEDSIDE.  WILL CONTINUE 
TO MONITOR

## 2020-01-10 NOTE — NUR
FAMILY

TEXT MD YANEZ FAMILY DOES NOT WANT PT DISCHARGED  NO BOWEL MOVEMENT WANTS DISIMPACTION 
WAITING FOR RESPONSE

## 2020-01-10 NOTE — NUR
rn notes:

performed bladder scan, showing 380ml. straight cath performed as ordered, obtained 380ml of 
urine, yellow colored, with sediments, foul smelling. Statement Selected

## 2020-01-11 VITALS — SYSTOLIC BLOOD PRESSURE: 133 MMHG | DIASTOLIC BLOOD PRESSURE: 80 MMHG

## 2020-01-11 VITALS — DIASTOLIC BLOOD PRESSURE: 74 MMHG | SYSTOLIC BLOOD PRESSURE: 122 MMHG

## 2020-01-11 VITALS — SYSTOLIC BLOOD PRESSURE: 122 MMHG | DIASTOLIC BLOOD PRESSURE: 74 MMHG

## 2020-01-11 VITALS — DIASTOLIC BLOOD PRESSURE: 90 MMHG | SYSTOLIC BLOOD PRESSURE: 139 MMHG

## 2020-01-11 VITALS — SYSTOLIC BLOOD PRESSURE: 139 MMHG | DIASTOLIC BLOOD PRESSURE: 73 MMHG

## 2020-01-11 VITALS — DIASTOLIC BLOOD PRESSURE: 64 MMHG | SYSTOLIC BLOOD PRESSURE: 130 MMHG

## 2020-01-11 LAB
BASE EXCESS BLDA CALC-SCNC: 3.4 MMOL/L
BASOPHILS # BLD AUTO: 0.1 /CMM (ref 0–0.2)
BASOPHILS NFR BLD AUTO: 1 % (ref 0–2)
BUN SERPL-MCNC: 8 MG/DL (ref 7–18)
CALCIUM SERPL-MCNC: 8.3 MG/DL (ref 8.5–10.1)
CHLORIDE SERPL-SCNC: 96 MMOL/L (ref 98–107)
CO2 SERPL-SCNC: 25 MMOL/L (ref 21–32)
CREAT SERPL-MCNC: 0.3 MG/DL (ref 0.6–1.3)
DO-HGB MFR BLDA: 63.4 MMHG
EOSINOPHIL NFR BLD AUTO: 1.8 % (ref 0–6)
GLUCOSE SERPL-MCNC: 119 MG/DL (ref 74–106)
HCT VFR BLD AUTO: 36 % (ref 33–45)
HGB BLD-MCNC: 12.1 G/DL (ref 11.5–14.8)
INHALED O2 CONCENTRATION: 30 %
LYMPHOCYTES NFR BLD AUTO: 1.1 /CMM (ref 0.8–4.8)
LYMPHOCYTES NFR BLD AUTO: 18.7 % (ref 20–44)
MCHC RBC AUTO-ENTMCNC: 34 G/DL (ref 31–36)
MCV RBC AUTO: 94 FL (ref 82–100)
MONOCYTES NFR BLD AUTO: 0.3 /CMM (ref 0.1–1.3)
MONOCYTES NFR BLD AUTO: 5.5 % (ref 2–12)
NEUTROPHILS # BLD AUTO: 4.5 /CMM (ref 1.8–8.9)
NEUTROPHILS NFR BLD AUTO: 73 % (ref 43–81)
PCO2 TEMP ADJ BLDA: 40.2 MMHG (ref 35–45)
PH TEMP ADJ BLDA: 7.45 [PH] (ref 7.35–7.45)
PHOSPHATE SERPL-MCNC: 3 MG/DL (ref 2.5–4.9)
PLATELET # BLD AUTO: 126 /CMM (ref 150–450)
PO2 TEMP ADJ BLDA: 103.3 MMHG (ref 75–100)
POTASSIUM SERPL-SCNC: 4 MMOL/L (ref 3.5–5.1)
RBC # BLD AUTO: 3.76 MIL/UL (ref 4–5.2)
SAO2 % BLDA: 97.3 % (ref 92–98.5)
SODIUM SERPL-SCNC: 131 MMOL/L (ref 136–145)
VENTILATION MODE VENT: (no result)
WBC NRBC COR # BLD AUTO: 6.1 K/UL (ref 4.3–11)

## 2020-01-11 RX ADMIN — Medication SCH DROP: at 08:26

## 2020-01-11 RX ADMIN — Medication SCH MLS/HR: at 17:28

## 2020-01-11 RX ADMIN — RIVAROXABAN SCH MG: 15 TABLET, FILM COATED ORAL at 08:15

## 2020-01-11 RX ADMIN — CALCIUM CARBONATE-CHOLECALCIFEROL TAB 250 MG-125 UNIT SCH UDTAB: 250-125 TAB at 17:16

## 2020-01-11 RX ADMIN — FERROUS SULFATE TAB 325 MG (65 MG ELEMENTAL FE) SCH MG: 325 (65 FE) TAB at 08:12

## 2020-01-11 RX ADMIN — POLYETHYLENE GLYCOL 3350 PRN GM: 17 POWDER, FOR SOLUTION ORAL at 17:19

## 2020-01-11 RX ADMIN — Medication SCH EACH: at 08:12

## 2020-01-11 RX ADMIN — Medication SCH MG: at 17:16

## 2020-01-11 RX ADMIN — Medication SCH TAB: at 08:12

## 2020-01-11 RX ADMIN — FOLIC ACID SCH MG: 1 TABLET ORAL at 08:12

## 2020-01-11 RX ADMIN — DEXTROSE AND SODIUM CHLORIDE PRN MLS/HR: 5; 450 INJECTION, SOLUTION INTRAVENOUS at 05:53

## 2020-01-11 RX ADMIN — LEVETIRACETAM SCH MG: 100 SOLUTION ORAL at 22:35

## 2020-01-11 RX ADMIN — Medication SCH MG: at 08:12

## 2020-01-11 RX ADMIN — Medication SCH DROP: at 17:16

## 2020-01-11 RX ADMIN — POLYETHYLENE GLYCOL 3350 PRN GM: 17 POWDER, FOR SOLUTION ORAL at 12:37

## 2020-01-11 RX ADMIN — CALCIUM CARBONATE-CHOLECALCIFEROL TAB 250 MG-125 UNIT SCH UDTAB: 250-125 TAB at 08:12

## 2020-01-11 RX ADMIN — Medication SCH EACH: at 17:16

## 2020-01-11 RX ADMIN — PANTOPRAZOLE SODIUM SCH MG: 40 TABLET, DELAYED RELEASE ORAL at 08:12

## 2020-01-11 RX ADMIN — VITAMIN D, TAB 1000IU (100/BT) SCH UNIT: 25 TAB at 08:12

## 2020-01-11 RX ADMIN — RIVAROXABAN SCH MG: 15 TABLET, FILM COATED ORAL at 17:17

## 2020-01-11 RX ADMIN — OXYCODONE HYDROCHLORIDE AND ACETAMINOPHEN SCH MG: 500 TABLET ORAL at 08:12

## 2020-01-11 NOTE — NUR
RN CLOSING NOTE 



PATIENT IN BED, NON VERBAL. SON AT BEDSIDE. VQ SCAN WAS DONE, RESULTS RELAYED TO DR YANEZ. 
ON 3L NC, NO SIGNS OF ANY SOB AT THIS TIME. PATIENT IS CONSTIPATED, WAS GIVEN MIRALAX X2. 
HAS A RIGHT HAND #22 WITH D5 HALF NS AT 75 ML/HR. BED LOCKED AND IN LOWEST POSITION. WILL 
ENDORSE TO NOC SHIFT FOR STACEY

## 2020-01-11 NOTE — NUR
RN NOTE



DR NICOLE ORDERED CT CHEST ANGIOGRAM. ASKED FAMILY IF PATIENT IS ALLERGIC TO DYE OR 
SHELLFISH. DAUGHTER STATES SHE DOES NOT WANT TO RISK THE PROCEDURE, THEY ARE NOT SURE IF THE 
PATIENT IS ALLERGIC TO DYE. DR NICOLE MADE AWARE.



DR YANEZ MADE AWARE AS WELL AND ORDERED TO DO A VQ SCAN INSTEAD. SON AT BEDSIDE AWARE AND 
PROCEDURE WILL BE DONE TODAY IN ABOUT AN HOUR OR TWO

## 2020-01-11 NOTE — NUR
DIAPER CHANGED, PATIENT'S DIAPER IS FULL OF URINE. CLEANED PATIENT AND KEPT DRY. Z-GUARD 
CREAM APPLIED OVER THE PERINEAL AREA. CHANGED POSITION.

## 2020-01-11 NOTE — NUR
RN NOTE 



PATIENT WAS PICKED UP TO DO THE VQ SCAN. SON ACCOMPANIED THE PATIENT. STABLE VS, PATIENT 
AWAKE BUT NON VERBAL

## 2020-01-11 NOTE — NUR
NO BM YET BUT PATIENT JUST PASSED GASES. MOM GIVEN. BLADDER SCANNED=ONLY 290ML. PATIENT IS 
COMFORTABLE. NO MOANING. NOT RESTLESS. SITTER AT THE BEDSIDE. HOB ELEVATED AT ALL TIMES. BED 
ALARM ON. BED IN LOWEST AND LOCKED POSITION.

## 2020-01-11 NOTE — NUR
RN INITIAL NOTE 



PATIENT IN BED, ASLEEP BUT EASILY AROUSABLE. PATIENT IS NON VERBAL, BUT UNDERSTANDS FARSI. 
CAREGIVER AT BEDSIDE. ON 3L NC, NO SOB NOTED. HAS A RIGHT HAND #22 WITH D5 HALF NS AT 75 
ML/HR. BED LOCKED AND IN LOWEST POSITION. CALL LIGHT WITHIN REACH. WILL CONTINUE TO MONITOR 
CLOSELY

## 2020-01-12 VITALS — SYSTOLIC BLOOD PRESSURE: 105 MMHG | DIASTOLIC BLOOD PRESSURE: 61 MMHG

## 2020-01-12 VITALS — DIASTOLIC BLOOD PRESSURE: 63 MMHG | SYSTOLIC BLOOD PRESSURE: 119 MMHG

## 2020-01-12 VITALS — DIASTOLIC BLOOD PRESSURE: 68 MMHG | SYSTOLIC BLOOD PRESSURE: 105 MMHG

## 2020-01-12 VITALS — SYSTOLIC BLOOD PRESSURE: 112 MMHG | DIASTOLIC BLOOD PRESSURE: 61 MMHG

## 2020-01-12 VITALS — DIASTOLIC BLOOD PRESSURE: 68 MMHG | SYSTOLIC BLOOD PRESSURE: 108 MMHG

## 2020-01-12 RX ADMIN — LEVETIRACETAM SCH MG: 100 SOLUTION ORAL at 22:00

## 2020-01-12 RX ADMIN — DEXTROSE AND SODIUM CHLORIDE PRN MLS/HR: 5; 450 INJECTION, SOLUTION INTRAVENOUS at 06:34

## 2020-01-12 RX ADMIN — Medication SCH MG: at 16:59

## 2020-01-12 RX ADMIN — VITAMIN D, TAB 1000IU (100/BT) SCH UNIT: 25 TAB at 08:58

## 2020-01-12 RX ADMIN — Medication SCH MG: at 09:00

## 2020-01-12 RX ADMIN — RIVAROXABAN SCH MG: 15 TABLET, FILM COATED ORAL at 17:00

## 2020-01-12 RX ADMIN — RIVAROXABAN SCH MG: 15 TABLET, FILM COATED ORAL at 08:59

## 2020-01-12 RX ADMIN — Medication SCH DROP: at 17:01

## 2020-01-12 RX ADMIN — FOLIC ACID SCH MG: 1 TABLET ORAL at 09:00

## 2020-01-12 RX ADMIN — LINEZOLID SCH MG: 600 TABLET, FILM COATED ORAL at 21:00

## 2020-01-12 RX ADMIN — Medication SCH TAB: at 08:58

## 2020-01-12 RX ADMIN — CALCIUM CARBONATE-CHOLECALCIFEROL TAB 250 MG-125 UNIT SCH UDTAB: 250-125 TAB at 17:00

## 2020-01-12 RX ADMIN — CALCIUM CARBONATE-CHOLECALCIFEROL TAB 250 MG-125 UNIT SCH UDTAB: 250-125 TAB at 09:10

## 2020-01-12 RX ADMIN — Medication SCH TAB: at 09:00

## 2020-01-12 RX ADMIN — Medication SCH DROP: at 09:01

## 2020-01-12 RX ADMIN — Medication SCH EACH: at 16:59

## 2020-01-12 RX ADMIN — OXYCODONE HYDROCHLORIDE AND ACETAMINOPHEN SCH MG: 500 TABLET ORAL at 08:58

## 2020-01-12 RX ADMIN — FERROUS SULFATE TAB 325 MG (65 MG ELEMENTAL FE) SCH MG: 325 (65 FE) TAB at 09:00

## 2020-01-12 RX ADMIN — Medication SCH EACH: at 08:58

## 2020-01-12 RX ADMIN — PANTOPRAZOLE SODIUM SCH MG: 40 TABLET, DELAYED RELEASE ORAL at 06:34

## 2020-01-12 RX ADMIN — Medication SCH MLS/HR: at 05:46

## 2020-01-12 NOTE — NUR
RN NOTES

 PATIENT STABLE NO ACUTE RESPIRATORY DISTRESS, V/S STABLE, ADMINISTERED SCHEDULED MEDICATION 
VIA CREASED , AND MIXED WITH APPLE SAUCE, INFUSING D51/2 NS AT 75 ML/HR ON LEFT HAND INTACT. 
ASSIST TURN AND REPOSITION Q 2 HR, PATIENT TOLERATED DINNER WELL. PRIVET CARE GIVER NEXT TO 
THE BED, SAFETY PRECAUTION MAINTAINED ALL THE TIME. ENDORSED ONCOMING NURSE FOLLOW PLAN OF 
CARE.

## 2020-01-12 NOTE — NUR
RN NOTES

 PATIENT IN THE BED RESTING, NO ACUTE RESPIRATORY DISTRESS, SEEN PATIENT VIA HOSPITALIST Dr. YANEZ, AND  INFECTION  NP TANIA , GET NEW BLOOD CADEN ORDER . CONTINUED MONITORING.

## 2020-01-12 NOTE — NUR
RN NOTES

 RECEIVED PATIENT IN THE BED. CONFUSED NONVERBAL, BUT OPEN EYES WHEN CALLED NAME OR TOUCHED, 
NO ACUTE RESPIRATORY DISTRESS, ON O2-2L NC. PATIENT TOTAL CARE. KEEP HOB ELEVATED FOR 
ASPIRATION PRECAUTION, V/S STABLE, ADMINISTERED SCHEDULED MEDICATION CRUSHED AND MIXED WITH 
APPLE SAUCE. IV ACCESS ON RIGHT FA INTACT, . BOWEL SOUNDS ARE  PRESENT  4 QUADRANT OF 
ABDOMEN, ASSIST EATING BY CARE GIVER. PATIENT TOLERATED BREAKFAST 50% . ASSIST TURN AND 
REPOSTION Q 2 HR.  CALL LIGHT WITHIN TO REACH. PATIENT INCONTINENT, USING DIAPER, APPLIED 
Z-GUARD. DVT PUMP ON. SAFETY PRECAUTION MAINTAINED ALL THE TIME.

## 2020-01-12 NOTE — NUR
rn notes

 patient was  discharged per hospitalist Dr Lora's order, but get call from son name Micheal 
and he is going to call insurance for appealing discharge order, call transferred to the 
case management for follow up.

## 2020-01-13 VITALS — DIASTOLIC BLOOD PRESSURE: 64 MMHG | SYSTOLIC BLOOD PRESSURE: 106 MMHG

## 2020-01-13 VITALS — SYSTOLIC BLOOD PRESSURE: 104 MMHG | DIASTOLIC BLOOD PRESSURE: 68 MMHG

## 2020-01-13 VITALS — SYSTOLIC BLOOD PRESSURE: 108 MMHG | DIASTOLIC BLOOD PRESSURE: 59 MMHG

## 2020-01-13 VITALS — SYSTOLIC BLOOD PRESSURE: 127 MMHG | DIASTOLIC BLOOD PRESSURE: 91 MMHG

## 2020-01-13 LAB
BASOPHILS # BLD AUTO: 0.1 /CMM (ref 0–0.2)
BASOPHILS NFR BLD AUTO: 1.1 % (ref 0–2)
BUN SERPL-MCNC: 6 MG/DL (ref 7–18)
CALCIUM SERPL-MCNC: 8.5 MG/DL (ref 8.5–10.1)
CHLORIDE SERPL-SCNC: 99 MMOL/L (ref 98–107)
CO2 SERPL-SCNC: 30 MMOL/L (ref 21–32)
CREAT SERPL-MCNC: 0.3 MG/DL (ref 0.6–1.3)
EOSINOPHIL NFR BLD AUTO: 1.5 % (ref 0–6)
GLUCOSE SERPL-MCNC: 105 MG/DL (ref 74–106)
HCT VFR BLD AUTO: 33 % (ref 33–45)
HGB BLD-MCNC: 11.3 G/DL (ref 11.5–14.8)
LYMPHOCYTES NFR BLD AUTO: 1.2 /CMM (ref 0.8–4.8)
LYMPHOCYTES NFR BLD AUTO: 18.5 % (ref 20–44)
MAGNESIUM SERPL-MCNC: 1.9 MG/DL (ref 1.8–2.4)
MCHC RBC AUTO-ENTMCNC: 35 G/DL (ref 31–36)
MCV RBC AUTO: 94 FL (ref 82–100)
MONOCYTES NFR BLD AUTO: 0.4 /CMM (ref 0.1–1.3)
MONOCYTES NFR BLD AUTO: 6.3 % (ref 2–12)
NEUTROPHILS # BLD AUTO: 4.6 /CMM (ref 1.8–8.9)
NEUTROPHILS NFR BLD AUTO: 72.6 % (ref 43–81)
PHOSPHATE SERPL-MCNC: 3.4 MG/DL (ref 2.5–4.9)
PLATELET # BLD AUTO: 163 /CMM (ref 150–450)
POTASSIUM SERPL-SCNC: 3.9 MMOL/L (ref 3.5–5.1)
RBC # BLD AUTO: 3.5 MIL/UL (ref 4–5.2)
SODIUM SERPL-SCNC: 134 MMOL/L (ref 136–145)
WBC NRBC COR # BLD AUTO: 6.3 K/UL (ref 4.3–11)

## 2020-01-13 RX ADMIN — Medication SCH EACH: at 17:19

## 2020-01-13 RX ADMIN — Medication SCH DROP: at 17:20

## 2020-01-13 RX ADMIN — RIVAROXABAN SCH MG: 15 TABLET, FILM COATED ORAL at 17:22

## 2020-01-13 RX ADMIN — LINEZOLID SCH MG: 600 TABLET, FILM COATED ORAL at 09:00

## 2020-01-13 RX ADMIN — CALCIUM CARBONATE-CHOLECALCIFEROL TAB 250 MG-125 UNIT SCH UDTAB: 250-125 TAB at 17:19

## 2020-01-13 RX ADMIN — DEXTROSE AND SODIUM CHLORIDE PRN MLS/HR: 5; 450 INJECTION, SOLUTION INTRAVENOUS at 05:39

## 2020-01-13 RX ADMIN — DEXTROSE AND SODIUM CHLORIDE PRN MLS/HR: 5; 450 INJECTION, SOLUTION INTRAVENOUS at 21:10

## 2020-01-13 RX ADMIN — Medication SCH TAB: at 09:58

## 2020-01-13 RX ADMIN — OXYCODONE HYDROCHLORIDE AND ACETAMINOPHEN SCH MG: 500 TABLET ORAL at 09:58

## 2020-01-13 RX ADMIN — Medication SCH EACH: at 09:58

## 2020-01-13 RX ADMIN — FOLIC ACID SCH MG: 1 TABLET ORAL at 09:58

## 2020-01-13 RX ADMIN — CALCIUM CARBONATE-CHOLECALCIFEROL TAB 250 MG-125 UNIT SCH UDTAB: 250-125 TAB at 10:03

## 2020-01-13 RX ADMIN — VITAMIN D, TAB 1000IU (100/BT) SCH UNIT: 25 TAB at 09:58

## 2020-01-13 RX ADMIN — Medication SCH MG: at 09:58

## 2020-01-13 RX ADMIN — LEVETIRACETAM SCH MG: 100 SOLUTION ORAL at 21:17

## 2020-01-13 RX ADMIN — Medication SCH DROP: at 10:12

## 2020-01-13 RX ADMIN — FERROUS SULFATE TAB 325 MG (65 MG ELEMENTAL FE) SCH MG: 325 (65 FE) TAB at 09:58

## 2020-01-13 RX ADMIN — PANTOPRAZOLE SODIUM SCH MG: 40 TABLET, DELAYED RELEASE ORAL at 10:02

## 2020-01-13 RX ADMIN — RIVAROXABAN SCH MG: 15 TABLET, FILM COATED ORAL at 10:01

## 2020-01-13 RX ADMIN — Medication SCH MG: at 17:19

## 2020-01-13 NOTE — NUR
m/s lvn: initial assessment



received pt in bed with eyes open, non-verbal. private caregiver at bedside. hob elevated. 
no distress noted. will monitor.

## 2020-01-13 NOTE — NUR
m/s lvn: notes



private caregiver still assisting with dinner. hob elevated. needs attended. no aspiration 
noted. will continue to monitor.

## 2020-01-13 NOTE — NUR
m/s lvn: md visit



seen and examined by dr. jacques at this time. dr. jacques spoke to daughter over the phone 
for updates. private caregiver at bedside. received verbal order to d'c scd to legs, pt 
doesn't need it, pt is on xarelto as stated and d'c zyvox po. orders read back and carried 
out and acknowledged.

## 2020-01-13 NOTE — NUR
m/s lvn: notes



called lab (micro) for f/u blood culture result, per earl (tech) will f/u the 24 hour 
result. will continue to monitor.

## 2020-01-13 NOTE — NUR
m/s lvn: notes



daughter and private caregiver remains at bedside. incontinent care of bowel and bladder 
rendered. kept clean and dry. good hayley care rendered. will continue to monitor.

## 2020-01-13 NOTE — NUR
MS/RN OPENING NOTES:



RECEIVED PATIENT IN BED RESTING COMFORTABLY. NO SOB NOTED, NO S/S OF ACUTE DISTRESS, DENISES 
PAIN OR DISCOMFORT AT THIS TIME. PT IS CONFUSED, NONVERBAL, BUT OPEN EYES WHEN CALLED NAME 
OR TOUCHED, ON ROOM AIR. HOB ELEVATED FOR ASPIRATION PRECAUTION, V/S STABLE, IV ACCESS ON 
RIGHT HAND #22G INTACT, PATENT AND FLUSHING WELL. D5 1/2 NS RUNNING AT 75ML/HR. CAREGIVER 
PRESENT AT BEDSIDE. CALL LIGHT WITHIN REACH. DVT PUMP ON. SAFETY PRECAUTION IN PLACE WITH 
BED IN LOW, LOCKED POSITION, SIDE RAILS UP X2. WILL CONTINUE MONITORING ACCORDINGLY.

## 2020-01-13 NOTE — NUR
m/s lvn: notes



daughter and private caregiver remains at bedside. hob elevated. lunch served. will continue 
to monitor.

## 2020-01-14 VITALS — DIASTOLIC BLOOD PRESSURE: 83 MMHG | SYSTOLIC BLOOD PRESSURE: 141 MMHG

## 2020-01-14 VITALS — SYSTOLIC BLOOD PRESSURE: 121 MMHG | DIASTOLIC BLOOD PRESSURE: 73 MMHG

## 2020-01-14 VITALS — DIASTOLIC BLOOD PRESSURE: 60 MMHG | SYSTOLIC BLOOD PRESSURE: 126 MMHG

## 2020-01-14 VITALS — DIASTOLIC BLOOD PRESSURE: 68 MMHG | SYSTOLIC BLOOD PRESSURE: 120 MMHG

## 2020-01-14 RX ADMIN — LEVETIRACETAM SCH MG: 100 SOLUTION ORAL at 22:08

## 2020-01-14 RX ADMIN — Medication SCH EACH: at 17:25

## 2020-01-14 RX ADMIN — Medication SCH MG: at 17:25

## 2020-01-14 RX ADMIN — Medication SCH DROP: at 17:26

## 2020-01-14 RX ADMIN — PANTOPRAZOLE SODIUM SCH MG: 40 TABLET, DELAYED RELEASE ORAL at 09:21

## 2020-01-14 RX ADMIN — VITAMIN D, TAB 1000IU (100/BT) SCH UNIT: 25 TAB at 09:21

## 2020-01-14 RX ADMIN — FOLIC ACID SCH MG: 1 TABLET ORAL at 09:23

## 2020-01-14 RX ADMIN — RIVAROXABAN SCH MG: 15 TABLET, FILM COATED ORAL at 09:23

## 2020-01-14 RX ADMIN — OXYCODONE HYDROCHLORIDE AND ACETAMINOPHEN SCH MG: 500 TABLET ORAL at 09:21

## 2020-01-14 RX ADMIN — Medication SCH MG: at 09:21

## 2020-01-14 RX ADMIN — Medication SCH TAB: at 09:21

## 2020-01-14 RX ADMIN — FERROUS SULFATE TAB 325 MG (65 MG ELEMENTAL FE) SCH MG: 325 (65 FE) TAB at 09:21

## 2020-01-14 RX ADMIN — Medication SCH EACH: at 09:23

## 2020-01-14 RX ADMIN — CALCIUM CARBONATE-CHOLECALCIFEROL TAB 250 MG-125 UNIT SCH UDTAB: 250-125 TAB at 17:25

## 2020-01-14 RX ADMIN — CALCIUM CARBONATE-CHOLECALCIFEROL TAB 250 MG-125 UNIT SCH UDTAB: 250-125 TAB at 09:21

## 2020-01-14 RX ADMIN — RIVAROXABAN SCH MG: 15 TABLET, FILM COATED ORAL at 17:25

## 2020-01-14 RX ADMIN — Medication SCH DROP: at 09:23

## 2020-01-14 NOTE — NUR
MS/RN CLOSING NOTES:



PATIENT IS IN BED RESTING COMFORTABLY. CAREGIVER AT BEDSIDE,. PT IS CONFUSED, NONVERBAL, NO 
SIGNIFICANT CHANGES IN CONDITION. NO SOB NOTED, NO S/S OF ACUTE DISTRESS, DENIES PAIN OR 
DISCOMFORT AT THIS TIME. ON ROOM AIR. HOB ELEVATED FOR ASPIRATION PRECAUTION, V/S STABLE, IV 
ACCESS ON RIGHT HAND #22G INTACT, PATENT AND FLUSHING WELL. D5 1/2 NS RUNNING AT 75ML/HR. 
CALL LIGHT WITHIN REACH. DVT PUMP ON. SAFETY PRECAUTION KEPT IN PLACE WITH BED IN LOW, 
LOCKED POSITION, SIDE RAILS UP X2. ALL NURSING NEEDS MET AND PROVIDED, WILL ENDORSE TO DAY 
SHIFT NURSE FOR STACEY.

## 2020-01-15 VITALS — SYSTOLIC BLOOD PRESSURE: 127 MMHG | DIASTOLIC BLOOD PRESSURE: 76 MMHG

## 2020-01-15 VITALS — SYSTOLIC BLOOD PRESSURE: 122 MMHG | DIASTOLIC BLOOD PRESSURE: 77 MMHG

## 2020-01-15 VITALS — SYSTOLIC BLOOD PRESSURE: 126 MMHG | DIASTOLIC BLOOD PRESSURE: 69 MMHG

## 2020-01-15 LAB
BASOPHILS # BLD AUTO: 0.1 /CMM (ref 0–0.2)
BASOPHILS NFR BLD AUTO: 1.2 % (ref 0–2)
BUN SERPL-MCNC: 9 MG/DL (ref 7–18)
CALCIUM SERPL-MCNC: 8.7 MG/DL (ref 8.5–10.1)
CHLORIDE SERPL-SCNC: 101 MMOL/L (ref 98–107)
CO2 SERPL-SCNC: 29 MMOL/L (ref 21–32)
CREAT SERPL-MCNC: 0.2 MG/DL (ref 0.6–1.3)
EOSINOPHIL NFR BLD AUTO: 1.7 % (ref 0–6)
GLUCOSE SERPL-MCNC: 85 MG/DL (ref 74–106)
HCT VFR BLD AUTO: 35 % (ref 33–45)
HGB BLD-MCNC: 11.9 G/DL (ref 11.5–14.8)
LYMPHOCYTES NFR BLD AUTO: 1.6 /CMM (ref 0.8–4.8)
LYMPHOCYTES NFR BLD AUTO: 25.4 % (ref 20–44)
MCHC RBC AUTO-ENTMCNC: 34 G/DL (ref 31–36)
MCV RBC AUTO: 95 FL (ref 82–100)
MONOCYTES NFR BLD AUTO: 0.4 /CMM (ref 0.1–1.3)
MONOCYTES NFR BLD AUTO: 6.8 % (ref 2–12)
NEUTROPHILS # BLD AUTO: 4.1 /CMM (ref 1.8–8.9)
NEUTROPHILS NFR BLD AUTO: 64.9 % (ref 43–81)
PLATELET # BLD AUTO: 227 /CMM (ref 150–450)
POTASSIUM SERPL-SCNC: 3.6 MMOL/L (ref 3.5–5.1)
RBC # BLD AUTO: 3.65 MIL/UL (ref 4–5.2)
SODIUM SERPL-SCNC: 137 MMOL/L (ref 136–145)
WBC NRBC COR # BLD AUTO: 6.3 K/UL (ref 4.3–11)

## 2020-01-15 RX ADMIN — DEXTROSE AND SODIUM CHLORIDE PRN MLS/HR: 5; 450 INJECTION, SOLUTION INTRAVENOUS at 07:47

## 2020-01-15 RX ADMIN — FERROUS SULFATE TAB 325 MG (65 MG ELEMENTAL FE) SCH MG: 325 (65 FE) TAB at 09:41

## 2020-01-15 RX ADMIN — Medication SCH EACH: at 17:38

## 2020-01-15 RX ADMIN — FOLIC ACID SCH MG: 1 TABLET ORAL at 09:41

## 2020-01-15 RX ADMIN — CALCIUM CARBONATE-CHOLECALCIFEROL TAB 250 MG-125 UNIT SCH UDTAB: 250-125 TAB at 17:38

## 2020-01-15 RX ADMIN — LEVETIRACETAM SCH MG: 100 SOLUTION ORAL at 22:33

## 2020-01-15 RX ADMIN — RIVAROXABAN SCH MG: 15 TABLET, FILM COATED ORAL at 09:42

## 2020-01-15 RX ADMIN — OXYCODONE HYDROCHLORIDE AND ACETAMINOPHEN SCH MG: 500 TABLET ORAL at 09:41

## 2020-01-15 RX ADMIN — Medication SCH MG: at 17:38

## 2020-01-15 RX ADMIN — CALCIUM CARBONATE-CHOLECALCIFEROL TAB 250 MG-125 UNIT SCH UDTAB: 250-125 TAB at 09:43

## 2020-01-15 RX ADMIN — Medication PRN OZ: at 22:51

## 2020-01-15 RX ADMIN — Medication SCH EACH: at 09:41

## 2020-01-15 RX ADMIN — Medication SCH MG: at 09:41

## 2020-01-15 RX ADMIN — RIVAROXABAN SCH MG: 15 TABLET, FILM COATED ORAL at 17:39

## 2020-01-15 RX ADMIN — PANTOPRAZOLE SODIUM SCH MG: 40 TABLET, DELAYED RELEASE ORAL at 08:27

## 2020-01-15 RX ADMIN — Medication SCH TAB: at 09:41

## 2020-01-15 RX ADMIN — Medication SCH DROP: at 17:40

## 2020-01-15 RX ADMIN — VITAMIN D, TAB 1000IU (100/BT) SCH UNIT: 25 TAB at 09:41

## 2020-01-15 RX ADMIN — Medication SCH DROP: at 09:43

## 2020-01-16 VITALS — DIASTOLIC BLOOD PRESSURE: 59 MMHG | SYSTOLIC BLOOD PRESSURE: 111 MMHG

## 2020-01-16 VITALS — SYSTOLIC BLOOD PRESSURE: 126 MMHG | DIASTOLIC BLOOD PRESSURE: 71 MMHG

## 2020-01-16 VITALS — DIASTOLIC BLOOD PRESSURE: 67 MMHG | SYSTOLIC BLOOD PRESSURE: 102 MMHG

## 2020-01-16 VITALS — DIASTOLIC BLOOD PRESSURE: 66 MMHG | SYSTOLIC BLOOD PRESSURE: 115 MMHG

## 2020-01-16 VITALS — DIASTOLIC BLOOD PRESSURE: 70 MMHG | SYSTOLIC BLOOD PRESSURE: 120 MMHG

## 2020-01-16 LAB
BASOPHILS # BLD AUTO: 0.1 /CMM (ref 0–0.2)
BASOPHILS NFR BLD AUTO: 0.9 % (ref 0–2)
BUN SERPL-MCNC: 8 MG/DL (ref 7–18)
CALCIUM SERPL-MCNC: 8.1 MG/DL (ref 8.5–10.1)
CHLORIDE SERPL-SCNC: 100 MMOL/L (ref 98–107)
CO2 SERPL-SCNC: 28 MMOL/L (ref 21–32)
CREAT SERPL-MCNC: 0.2 MG/DL (ref 0.6–1.3)
EOSINOPHIL NFR BLD AUTO: 1.8 % (ref 0–6)
GLUCOSE SERPL-MCNC: 98 MG/DL (ref 74–106)
HCT VFR BLD AUTO: 33 % (ref 33–45)
HGB BLD-MCNC: 11.1 G/DL (ref 11.5–14.8)
LYMPHOCYTES NFR BLD AUTO: 1.3 /CMM (ref 0.8–4.8)
LYMPHOCYTES NFR BLD AUTO: 22.9 % (ref 20–44)
MCHC RBC AUTO-ENTMCNC: 33 G/DL (ref 31–36)
MCV RBC AUTO: 96 FL (ref 82–100)
MONOCYTES NFR BLD AUTO: 0.3 /CMM (ref 0.1–1.3)
MONOCYTES NFR BLD AUTO: 6.2 % (ref 2–12)
NEUTROPHILS # BLD AUTO: 3.9 /CMM (ref 1.8–8.9)
NEUTROPHILS NFR BLD AUTO: 68.2 % (ref 43–81)
PLATELET # BLD AUTO: 236 /CMM (ref 150–450)
POTASSIUM SERPL-SCNC: 3.6 MMOL/L (ref 3.5–5.1)
RBC # BLD AUTO: 3.49 MIL/UL (ref 4–5.2)
SODIUM SERPL-SCNC: 136 MMOL/L (ref 136–145)
WBC NRBC COR # BLD AUTO: 5.7 K/UL (ref 4.3–11)

## 2020-01-16 RX ADMIN — Medication SCH MG: at 09:40

## 2020-01-16 RX ADMIN — Medication SCH TAB: at 09:40

## 2020-01-16 RX ADMIN — FERROUS SULFATE TAB 325 MG (65 MG ELEMENTAL FE) SCH MG: 325 (65 FE) TAB at 09:40

## 2020-01-16 RX ADMIN — Medication SCH DROP: at 09:52

## 2020-01-16 RX ADMIN — OXYCODONE HYDROCHLORIDE AND ACETAMINOPHEN SCH MG: 500 TABLET ORAL at 09:40

## 2020-01-16 RX ADMIN — LEVETIRACETAM SCH MG: 100 SOLUTION ORAL at 21:42

## 2020-01-16 RX ADMIN — Medication SCH MG: at 17:44

## 2020-01-16 RX ADMIN — PANTOPRAZOLE SODIUM SCH MG: 40 TABLET, DELAYED RELEASE ORAL at 08:20

## 2020-01-16 RX ADMIN — RIVAROXABAN SCH MG: 15 TABLET, FILM COATED ORAL at 17:57

## 2020-01-16 RX ADMIN — CALCIUM CARBONATE-CHOLECALCIFEROL TAB 250 MG-125 UNIT SCH UDTAB: 250-125 TAB at 09:40

## 2020-01-16 RX ADMIN — VITAMIN D, TAB 1000IU (100/BT) SCH UNIT: 25 TAB at 09:40

## 2020-01-16 RX ADMIN — CALCIUM CARBONATE-CHOLECALCIFEROL TAB 250 MG-125 UNIT SCH UDTAB: 250-125 TAB at 17:46

## 2020-01-16 RX ADMIN — Medication SCH DROP: at 18:08

## 2020-01-16 RX ADMIN — Medication SCH EACH: at 17:44

## 2020-01-16 RX ADMIN — PANTOPRAZOLE SODIUM SCH MG: 40 TABLET, DELAYED RELEASE ORAL at 09:40

## 2020-01-16 RX ADMIN — FOLIC ACID SCH MG: 1 TABLET ORAL at 09:40

## 2020-01-16 RX ADMIN — RIVAROXABAN SCH MG: 15 TABLET, FILM COATED ORAL at 09:44

## 2020-01-16 RX ADMIN — Medication SCH EACH: at 09:40

## 2020-01-16 NOTE — NUR
RN OPENING NOTES

RECEIVED PT ON BED, RESTING COMFORTABLY . FARSI SPEAKING. RESPIRATION EVEN AND UNLABORED. 
DENIES ANY PAIN OR DISCOMFORT. NO FACIAL GRIMACING DURING ASSESSMENT. ON ROOM AIR. HOB 
ELEVATED FOR ASPIRATION PRECAUTION , IV ACCESS INTACT, PATENT AND FLUSHED WELL. NO S/S OF 
INFILTRATION. CALL LIGHT WITHIN REACH . BED LOW IN LOW POSITION, LOCKED , SIDE RAILS UP X2 . 
WILL CONTINUE TO MONITOR

## 2020-01-16 NOTE — NUR
RN CLOSING NOTES



PATIENT IN BED, WITH THE CAREGIVER ON BEDSIDE. VITAL SIGNS WNL. IN NO ACUTE DISTRESS. ON 
STRICT PRECAUTION. HOB ELEVATED ALL TIMES.  KEPT CLEAN AND DRY. ALL NEEDS MET. CALL LIGHT 
WITHIN REACH. BED ON LOW POSITION AND LOCKED. ENDORSED TO PM RN.

## 2020-01-17 VITALS — SYSTOLIC BLOOD PRESSURE: 111 MMHG | DIASTOLIC BLOOD PRESSURE: 45 MMHG

## 2020-01-17 VITALS — DIASTOLIC BLOOD PRESSURE: 64 MMHG | SYSTOLIC BLOOD PRESSURE: 110 MMHG

## 2020-01-17 VITALS — DIASTOLIC BLOOD PRESSURE: 75 MMHG | SYSTOLIC BLOOD PRESSURE: 119 MMHG

## 2020-01-17 VITALS — SYSTOLIC BLOOD PRESSURE: 103 MMHG | DIASTOLIC BLOOD PRESSURE: 62 MMHG

## 2020-01-17 LAB
BASOPHILS # BLD AUTO: 0.1 /CMM (ref 0–0.2)
BASOPHILS NFR BLD AUTO: 0.8 % (ref 0–2)
BUN SERPL-MCNC: 9 MG/DL (ref 7–18)
CALCIUM SERPL-MCNC: 8.5 MG/DL (ref 8.5–10.1)
CHLORIDE SERPL-SCNC: 99 MMOL/L (ref 98–107)
CO2 SERPL-SCNC: 28 MMOL/L (ref 21–32)
CREAT SERPL-MCNC: 0.3 MG/DL (ref 0.6–1.3)
EOSINOPHIL NFR BLD AUTO: 1.1 % (ref 0–6)
GLUCOSE SERPL-MCNC: 92 MG/DL (ref 74–106)
HCT VFR BLD AUTO: 35 % (ref 33–45)
HGB BLD-MCNC: 11.8 G/DL (ref 11.5–14.8)
LYMPHOCYTES NFR BLD AUTO: 1.4 /CMM (ref 0.8–4.8)
LYMPHOCYTES NFR BLD AUTO: 21.7 % (ref 20–44)
MCHC RBC AUTO-ENTMCNC: 34 G/DL (ref 31–36)
MCV RBC AUTO: 95 FL (ref 82–100)
MONOCYTES NFR BLD AUTO: 0.4 /CMM (ref 0.1–1.3)
MONOCYTES NFR BLD AUTO: 5.7 % (ref 2–12)
NEUTROPHILS # BLD AUTO: 4.6 /CMM (ref 1.8–8.9)
NEUTROPHILS NFR BLD AUTO: 70.7 % (ref 43–81)
PLATELET # BLD AUTO: 264 /CMM (ref 150–450)
POTASSIUM SERPL-SCNC: 3.8 MMOL/L (ref 3.5–5.1)
RBC # BLD AUTO: 3.68 MIL/UL (ref 4–5.2)
SODIUM SERPL-SCNC: 135 MMOL/L (ref 136–145)
WBC NRBC COR # BLD AUTO: 6.4 K/UL (ref 4.3–11)

## 2020-01-17 RX ADMIN — FERROUS SULFATE TAB 325 MG (65 MG ELEMENTAL FE) SCH MG: 325 (65 FE) TAB at 10:18

## 2020-01-17 RX ADMIN — Medication SCH DROP: at 18:51

## 2020-01-17 RX ADMIN — RIVAROXABAN SCH MG: 15 TABLET, FILM COATED ORAL at 10:18

## 2020-01-17 RX ADMIN — OXYCODONE HYDROCHLORIDE AND ACETAMINOPHEN SCH MG: 500 TABLET ORAL at 10:16

## 2020-01-17 RX ADMIN — CALCIUM CARBONATE-CHOLECALCIFEROL TAB 250 MG-125 UNIT SCH UDTAB: 250-125 TAB at 10:23

## 2020-01-17 RX ADMIN — CALCIUM CARBONATE-CHOLECALCIFEROL TAB 250 MG-125 UNIT SCH UDTAB: 250-125 TAB at 18:55

## 2020-01-17 RX ADMIN — Medication SCH EACH: at 10:16

## 2020-01-17 RX ADMIN — Medication SCH EACH: at 18:51

## 2020-01-17 RX ADMIN — RIVAROXABAN SCH MG: 15 TABLET, FILM COATED ORAL at 18:54

## 2020-01-17 RX ADMIN — PANTOPRAZOLE SODIUM SCH MG: 40 TABLET, DELAYED RELEASE ORAL at 10:16

## 2020-01-17 RX ADMIN — Medication SCH DROP: at 12:38

## 2020-01-17 RX ADMIN — FOLIC ACID SCH MG: 1 TABLET ORAL at 10:18

## 2020-01-17 RX ADMIN — Medication SCH TAB: at 10:23

## 2020-01-17 RX ADMIN — Medication SCH TAB: at 10:16

## 2020-01-17 RX ADMIN — Medication PRN OZ: at 10:50

## 2020-01-17 RX ADMIN — Medication SCH MG: at 10:23

## 2020-01-17 RX ADMIN — Medication SCH MG: at 18:51

## 2020-01-17 RX ADMIN — LEVETIRACETAM SCH MG: 100 SOLUTION ORAL at 21:47

## 2020-01-17 RX ADMIN — VITAMIN D, TAB 1000IU (100/BT) SCH UNIT: 25 TAB at 10:16

## 2020-01-17 NOTE — NUR
Nurse Notes:

    report given to the night nurse Deysi BAUMANN.  Patient is receiving tears eye drops,  
patient needs enecouragement to open her eyes for her drops.  caregiver still at bedside.

## 2020-01-17 NOTE — NUR
MS RN CLOSING NOTES: PATIENT IN BED, AWAKE, NON-VERBAL. VITALS STABLE. AFEBRILE. NO PAIN. 
CAREGIVER AT THE BEDSIDE. BED IN LOWEST AND LOCKED POSITION. NO RESP DISTRESS NOTED. HOB 
ELEVATED AT ALL TIMES.

## 2020-01-17 NOTE — NUR
RN MS OPENING NOTES

 RECEIVED PATIENT IN BED WITH HEAD OF BED ELEVATED FOR ASPIRATION PRECAUTIONS RESPIRATIONS 
EVEN AND UNLABORED WITH EQUAL RISE AND FALL OF CHEST, APPEARS COMFORTABLE AT THIS TIME, NO 
FACIAL GRIMACING PRESENT, NO MOANS PRESENT, IV SITE TO LEFT HAND REMOVED D/T DISLODGED. NEW 
IV SITE TO LEFT FA #22 G . INTACT AND PATENT, NO REDNESS, NO INFILTRATION, SL. CAREGIVER AT 
BEDSIDE, PATIENT REPOSITIONED KEPT CLEAN ALL NEEDS ATTENDED AT THIS TIME, WILL CONTINUE TO 
MONITOR AND ATTEND TO NEEDS.

## 2020-01-17 NOTE — NUR
MS NURSE NOTES:

 

RECEIVED PATIENT IN BED, WITH THE CAREGIVER ON BEDSIDE. VITAL SIGNS WNL. IN NO ACUTE 
DISTRESS. ON STRICT PRECAUTION. HOB ELEVATED ALL TIMES. CALL LIGHT WITHIN REACH. BED ON LOW 
POSITION AND LOCKED. RECEIVED REPORT FROM THE NIGHT NURSE OMAR ESQUIVEL RN

## 2020-01-17 NOTE — NUR
Nurse Notes:

    Caregiver at the bedside,  patient is wide awake, not speaking but open yes,  No 
complaints of pain medications. In no respiratory distress.

## 2020-01-17 NOTE — NUR
Nurse Notes:

   daughter at the bedside, will feed patient lunch,  patient in no respiratory distress.  
patient is more drowsy. but eating.  All Medications given crushed in apple sauce.  Appears 
in no discomfort.

## 2020-01-18 VITALS — SYSTOLIC BLOOD PRESSURE: 105 MMHG | DIASTOLIC BLOOD PRESSURE: 60 MMHG

## 2020-01-18 VITALS — DIASTOLIC BLOOD PRESSURE: 59 MMHG | SYSTOLIC BLOOD PRESSURE: 112 MMHG

## 2020-01-18 VITALS — DIASTOLIC BLOOD PRESSURE: 68 MMHG | SYSTOLIC BLOOD PRESSURE: 117 MMHG

## 2020-01-18 LAB
BASOPHILS # BLD AUTO: 0.1 /CMM (ref 0–0.2)
BASOPHILS NFR BLD AUTO: 1 % (ref 0–2)
BUN SERPL-MCNC: 11 MG/DL (ref 7–18)
CALCIUM SERPL-MCNC: 8.7 MG/DL (ref 8.5–10.1)
CHLORIDE SERPL-SCNC: 101 MMOL/L (ref 98–107)
CO2 SERPL-SCNC: 30 MMOL/L (ref 21–32)
CREAT SERPL-MCNC: 0.3 MG/DL (ref 0.6–1.3)
EOSINOPHIL NFR BLD AUTO: 0.9 % (ref 0–6)
GLUCOSE SERPL-MCNC: 97 MG/DL (ref 74–106)
HCT VFR BLD AUTO: 37 % (ref 33–45)
HGB BLD-MCNC: 12.3 G/DL (ref 11.5–14.8)
LYMPHOCYTES NFR BLD AUTO: 1.5 /CMM (ref 0.8–4.8)
LYMPHOCYTES NFR BLD AUTO: 23.3 % (ref 20–44)
MCHC RBC AUTO-ENTMCNC: 34 G/DL (ref 31–36)
MCV RBC AUTO: 95 FL (ref 82–100)
MONOCYTES NFR BLD AUTO: 0.4 /CMM (ref 0.1–1.3)
MONOCYTES NFR BLD AUTO: 6.4 % (ref 2–12)
NEUTROPHILS # BLD AUTO: 4.4 /CMM (ref 1.8–8.9)
NEUTROPHILS NFR BLD AUTO: 68.4 % (ref 43–81)
PLATELET # BLD AUTO: 266 /CMM (ref 150–450)
POTASSIUM SERPL-SCNC: 3.8 MMOL/L (ref 3.5–5.1)
RBC # BLD AUTO: 3.83 MIL/UL (ref 4–5.2)
SODIUM SERPL-SCNC: 138 MMOL/L (ref 136–145)
WBC NRBC COR # BLD AUTO: 6.4 K/UL (ref 4.3–11)

## 2020-01-18 RX ADMIN — FERROUS SULFATE TAB 325 MG (65 MG ELEMENTAL FE) SCH MG: 325 (65 FE) TAB at 09:14

## 2020-01-18 RX ADMIN — OXYCODONE HYDROCHLORIDE AND ACETAMINOPHEN SCH MG: 500 TABLET ORAL at 09:14

## 2020-01-18 RX ADMIN — CALCIUM CARBONATE-CHOLECALCIFEROL TAB 250 MG-125 UNIT SCH UDTAB: 250-125 TAB at 09:16

## 2020-01-18 RX ADMIN — Medication SCH MG: at 09:14

## 2020-01-18 RX ADMIN — LEVETIRACETAM SCH MG: 100 SOLUTION ORAL at 21:24

## 2020-01-18 RX ADMIN — FOLIC ACID SCH MG: 1 TABLET ORAL at 09:14

## 2020-01-18 RX ADMIN — CALCIUM CARBONATE-CHOLECALCIFEROL TAB 250 MG-125 UNIT SCH UDTAB: 250-125 TAB at 17:27

## 2020-01-18 RX ADMIN — Medication SCH TAB: at 09:13

## 2020-01-18 RX ADMIN — VITAMIN D, TAB 1000IU (100/BT) SCH UNIT: 25 TAB at 09:13

## 2020-01-18 RX ADMIN — Medication SCH DROP: at 09:13

## 2020-01-18 RX ADMIN — Medication SCH DROP: at 17:26

## 2020-01-18 RX ADMIN — RIVAROXABAN SCH MG: 15 TABLET, FILM COATED ORAL at 09:15

## 2020-01-18 RX ADMIN — RIVAROXABAN SCH MG: 15 TABLET, FILM COATED ORAL at 17:27

## 2020-01-18 RX ADMIN — Medication SCH EACH: at 09:14

## 2020-01-18 RX ADMIN — Medication SCH MG: at 17:26

## 2020-01-18 RX ADMIN — Medication SCH TAB: at 09:14

## 2020-01-18 NOTE — NUR
RN MS CLOSING NOTES

 PATIENT IN BED WITH HEAD OF BED ELEVATED FOR ASPIRATION PRECAUTIONS ,RESPIRATIONS EVEN AND 
UNLABORED WITH EQUAL RISE AND FALL OF CHEST, APPEARS COMFORTABLE AT THIS TIME, NO FACIAL 
GRIMACING PRESENT, NO MOANS PRESENT, REPOSITIONED Q2 H AND AS NEEDED, HEELS OFFLOADED, SKIN 
REMAINS INTACT NO REDNESS,PERINEAL CARE PROVIDED REMAINS CLEAN AND DRY NTL FLUIDS GIVEN 
TOLERATED WELL, IV SITE TO LEFT FA #22 G . INTACT AND PATENT, NO REDNESS, NO INFILTRATION, 
SL. CAREGIVER AT BEDSIDE, PATIENT KEPT CLEAN ALL NEEDS ATTENDED AT THIS TIME, ALL DUE MEDS 
GIVEN AS ORDERED, NO ADVERSE REACTIONS, WILL CONTINUE TO MONITOR AND ATTEND TO NEEDS AND 
ENDORSE TO NEXT SHIFT.

## 2020-01-18 NOTE — NUR
sleepy but easily aroused, care giver at the bedside, left after breakfast.  Feeder, 
complete care, took all pills in thicket, swallowing no problem.

now son as a caregiver.

Per charge nurse and confirmed by , patient is to go back to [facility] today, 
report given to Karie at the facility, and ambulance on the way.

Son made aware of the transfer, " want to appeal" " my mother did not completely look well". 
 Both charge nurse and  notified, transfer on hold now

## 2020-01-18 NOTE — NUR
MS RN NOTES



RECEIVED PT IN BED AWAKE WITH FAMILY AT BEDSIDE.  PT A/O X0-1 NONVERBAL.  RESPIRATIONS EVEN 
AND UNLABORED WITH NO S/S OF ACUTE DISTRESS OR SOB NOTED AT THIS TIME.  PT NOTED WITH MADDISON 
#22G PATENT AND INTACT AND SL.  NO S/S OF PAIN AT THIS TIME.  SAFETY MEASURES IN PLACE WITH 
BED IN LOWEST LOCKED POSITION WITH SIDE RAILS UP X2.  CALL LIGHT WITHIN REACH.  WILL 
CONTINUE TO MONITOR.

## 2020-01-19 VITALS — SYSTOLIC BLOOD PRESSURE: 115 MMHG | DIASTOLIC BLOOD PRESSURE: 65 MMHG

## 2020-01-19 VITALS — DIASTOLIC BLOOD PRESSURE: 65 MMHG | SYSTOLIC BLOOD PRESSURE: 115 MMHG

## 2020-01-19 VITALS — DIASTOLIC BLOOD PRESSURE: 64 MMHG | SYSTOLIC BLOOD PRESSURE: 111 MMHG

## 2020-01-19 VITALS — DIASTOLIC BLOOD PRESSURE: 81 MMHG | SYSTOLIC BLOOD PRESSURE: 131 MMHG

## 2020-01-19 RX ADMIN — FERROUS SULFATE TAB 325 MG (65 MG ELEMENTAL FE) SCH MG: 325 (65 FE) TAB at 08:22

## 2020-01-19 RX ADMIN — FOLIC ACID SCH MG: 1 TABLET ORAL at 08:21

## 2020-01-19 RX ADMIN — RIVAROXABAN SCH MG: 15 TABLET, FILM COATED ORAL at 08:23

## 2020-01-19 RX ADMIN — Medication SCH MG: at 08:21

## 2020-01-19 RX ADMIN — Medication SCH EACH: at 09:00

## 2020-01-19 RX ADMIN — VITAMIN D, TAB 1000IU (100/BT) SCH UNIT: 25 TAB at 08:20

## 2020-01-19 RX ADMIN — Medication SCH MG: at 16:32

## 2020-01-19 RX ADMIN — CALCIUM CARBONATE-CHOLECALCIFEROL TAB 250 MG-125 UNIT SCH UDTAB: 250-125 TAB at 08:20

## 2020-01-19 RX ADMIN — Medication SCH DROP: at 16:39

## 2020-01-19 RX ADMIN — Medication SCH DROP: at 08:20

## 2020-01-19 RX ADMIN — PANTOPRAZOLE SODIUM SCH MG: 40 TABLET, DELAYED RELEASE ORAL at 08:20

## 2020-01-19 RX ADMIN — Medication SCH TAB: at 08:21

## 2020-01-19 RX ADMIN — CALCIUM CARBONATE-CHOLECALCIFEROL TAB 250 MG-125 UNIT SCH UDTAB: 250-125 TAB at 16:32

## 2020-01-19 RX ADMIN — RIVAROXABAN SCH MG: 15 TABLET, FILM COATED ORAL at 16:33

## 2020-01-19 RX ADMIN — Medication SCH EACH: at 16:32

## 2020-01-19 RX ADMIN — OXYCODONE HYDROCHLORIDE AND ACETAMINOPHEN SCH MG: 500 TABLET ORAL at 08:21

## 2020-01-19 NOTE — NUR
alert, non-verbal, flat affect.  family still appealing , regarding the transfer to [facility] 
[facility].   discharge pending

## 2020-01-19 NOTE — NUR
MS RN NOTES



RECEIVED PT IN BED AWAKE WITH CAREGIVER AT BEDSIDE.  PT A/O X0-1 NONVERBAL.  RESPIRATIONS 
EVEN AND UNLABORED WITH NO S/S OF ACUTE DISTRESS OR SOB NOTED THROUGHOUT SHIFT.  PT NOTED 
WITH MADDISON #22G PATENT AND INTACT AND SL.  PT KEPT CLEAN, DRY, AND COMFORTABLE.  PT TURNED Q2 
HRS THROUGHOUT SHIFT.  NO S/S OF PAIN AT THIS TIME.  SAFETY MEASURES IN PLACE WITH BED IN 
LOWEST LOCKED POSITION WITH SIDE RAILS UP X2.  CALL LIGHT WITHIN REACH.  WILL ENDORSE TO 
ONCOMING NURSE FOR STACEY.

## 2020-01-19 NOTE — NUR
patient's condition remains unchanged, son Micheal approached author twice during this shift, "  
Me and my sister, both thought about the  discharge our mom back to [facility], and agreed 
with the decision.  ".  Charge Nurse, and Case  Manager notified, report, again given to 
Mika .

 time expected at     1900 tonite.  Care giver made aware.

## 2020-01-19 NOTE — NUR
REPORT GIVEN TO [facility] 

IV SITE REMOVED FROM LEFT AC, DRESSING APPLIED.

## 2020-02-10 ENCOUNTER — HOSPITAL ENCOUNTER (INPATIENT)
Dept: HOSPITAL 54 - ER | Age: 80
LOS: 14 days | Discharge: NURSING, REHAB OR LONG-TERM CARE | DRG: 871 | End: 2020-02-24
Attending: NURSE PRACTITIONER | Admitting: NURSE PRACTITIONER
Payer: MEDICARE

## 2020-02-10 VITALS — DIASTOLIC BLOOD PRESSURE: 92 MMHG | SYSTOLIC BLOOD PRESSURE: 133 MMHG

## 2020-02-10 VITALS — BODY MASS INDEX: 23.22 KG/M2 | WEIGHT: 123 LBS | HEIGHT: 61 IN

## 2020-02-10 DIAGNOSIS — L98.8: ICD-10-CM

## 2020-02-10 DIAGNOSIS — I82.592: ICD-10-CM

## 2020-02-10 DIAGNOSIS — Z96.643: ICD-10-CM

## 2020-02-10 DIAGNOSIS — D68.69: ICD-10-CM

## 2020-02-10 DIAGNOSIS — G30.9: ICD-10-CM

## 2020-02-10 DIAGNOSIS — E87.1: ICD-10-CM

## 2020-02-10 DIAGNOSIS — Z79.01: ICD-10-CM

## 2020-02-10 DIAGNOSIS — Z87.01: ICD-10-CM

## 2020-02-10 DIAGNOSIS — A41.9: Primary | ICD-10-CM

## 2020-02-10 DIAGNOSIS — E86.1: ICD-10-CM

## 2020-02-10 DIAGNOSIS — B96.5: ICD-10-CM

## 2020-02-10 DIAGNOSIS — D50.9: ICD-10-CM

## 2020-02-10 DIAGNOSIS — Z88.2: ICD-10-CM

## 2020-02-10 DIAGNOSIS — G40.909: ICD-10-CM

## 2020-02-10 DIAGNOSIS — L30.4: ICD-10-CM

## 2020-02-10 DIAGNOSIS — N39.0: ICD-10-CM

## 2020-02-10 DIAGNOSIS — G93.40: ICD-10-CM

## 2020-02-10 DIAGNOSIS — K21.9: ICD-10-CM

## 2020-02-10 DIAGNOSIS — R53.2: ICD-10-CM

## 2020-02-10 DIAGNOSIS — Z87.440: ICD-10-CM

## 2020-02-10 DIAGNOSIS — I82.532: ICD-10-CM

## 2020-02-10 DIAGNOSIS — E87.2: ICD-10-CM

## 2020-02-10 DIAGNOSIS — I50.32: ICD-10-CM

## 2020-02-10 DIAGNOSIS — I11.0: ICD-10-CM

## 2020-02-10 DIAGNOSIS — F02.80: ICD-10-CM

## 2020-02-10 LAB
ALBUMIN SERPL BCP-MCNC: 3.8 G/DL (ref 3.4–5)
ALP SERPL-CCNC: 239 U/L (ref 46–116)
ALT SERPL W P-5'-P-CCNC: 24 U/L (ref 12–78)
AST SERPL W P-5'-P-CCNC: 25 U/L (ref 15–37)
BASOPHILS # BLD AUTO: 0.1 /CMM (ref 0–0.2)
BASOPHILS NFR BLD AUTO: 1 % (ref 0–2)
BILIRUB DIRECT SERPL-MCNC: 0.1 MG/DL (ref 0–0.2)
BILIRUB SERPL-MCNC: 0.3 MG/DL (ref 0.2–1)
BUN SERPL-MCNC: 11 MG/DL (ref 7–18)
CALCIUM SERPL-MCNC: 9.6 MG/DL (ref 8.5–10.1)
CHLORIDE SERPL-SCNC: 98 MMOL/L (ref 98–107)
CO2 SERPL-SCNC: 29 MMOL/L (ref 21–32)
CREAT SERPL-MCNC: 0.5 MG/DL (ref 0.6–1.3)
EOSINOPHIL NFR BLD AUTO: 1.1 % (ref 0–6)
ETHANOL SERPL-MCNC: < 3 MG/DL (ref 0–0)
GLUCOSE SERPL-MCNC: 118 MG/DL (ref 74–106)
HCT VFR BLD AUTO: 45 % (ref 33–45)
HGB BLD-MCNC: 14.8 G/DL (ref 11.5–14.8)
LYMPHOCYTES NFR BLD AUTO: 1.9 /CMM (ref 0.8–4.8)
LYMPHOCYTES NFR BLD AUTO: 32.6 % (ref 20–44)
MCHC RBC AUTO-ENTMCNC: 33 G/DL (ref 31–36)
MCV RBC AUTO: 97 FL (ref 82–100)
MONOCYTES NFR BLD AUTO: 0.3 /CMM (ref 0.1–1.3)
MONOCYTES NFR BLD AUTO: 4.7 % (ref 2–12)
NEUTROPHILS # BLD AUTO: 3.6 /CMM (ref 1.8–8.9)
NEUTROPHILS NFR BLD AUTO: 60.6 % (ref 43–81)
PH UR STRIP: 7.5 [PH] (ref 5–8)
PHENOBARB SERPL-MCNC: 1 UG/ML (ref 15–39)
PHENYTOIN SERPL-MCNC: < 0.5 UG/ML (ref 10–20)
PLATELET # BLD AUTO: 153 /CMM (ref 150–450)
POTASSIUM SERPL-SCNC: 4.5 MMOL/L (ref 3.5–5.1)
PROT SERPL-MCNC: 8.4 G/DL (ref 6.4–8.2)
RBC # BLD AUTO: 4.6 MIL/UL (ref 4–5.2)
RBC #/AREA URNS HPF: (no result) /HPF (ref 0–2)
SODIUM SERPL-SCNC: 134 MMOL/L (ref 136–145)
UROBILINOGEN UR STRIP-MCNC: 0.2 EU/DL
VALPROATE SERPL-MCNC: < 3 UG/ML (ref 50–100)
WBC #/AREA URNS HPF: (no result) /HPF
WBC #/AREA URNS HPF: (no result) /HPF (ref 0–3)
WBC NRBC COR # BLD AUTO: 5.9 K/UL (ref 4.3–11)

## 2020-02-10 PROCEDURE — G0480 DRUG TEST DEF 1-7 CLASSES: HCPCS

## 2020-02-10 PROCEDURE — G0378 HOSPITAL OBSERVATION PER HR: HCPCS

## 2020-02-10 RX ADMIN — SODIUM CHLORIDE PRN MLS/HR: 9 INJECTION, SOLUTION INTRAVENOUS at 22:36

## 2020-02-10 RX ADMIN — LEVETIRACETAM SCH MG: 100 SOLUTION ORAL at 22:35

## 2020-02-10 RX ADMIN — LACTOBACILLUS TAB SCH EACH: TAB at 22:35

## 2020-02-10 NOTE — NUR
edd, from snf, had seizure post ictal, unkown time, 95% on RA, BS 85.  PT 
IS NONVERBAL AND HAS HX OF DEMENTIA, THIS IS HER BASELINE PER THE FAMILY.  
BILATERAL UPPER AND LOWER EXTREMITIES CONTRACTED.  INCONTINENT OF BOWEL AND 
BLADDER, WEARS ADULT DIAPER.  AMBULATORY WITH WALKER AT HOME.  NO ACUTE 
DISTRESS NOTED.  SKIN WARM, DRY, INTACT.  FAMILY AT BEDSIDE.  ON MONITOR AND 
READY FOR EVAL.

-------------------------------------------------------------------------------

Addendum: 02/10/20 at 2107 by MICHELLE

-------------------------------------------------------------------------------

PT IS NON-AMBULATORY

## 2020-02-10 NOTE — NUR
TELE RN ADMISSION NOTES



Received patient from ER via gurney. Admitted to Tele 310-1 due to sepsis and UTI under the 
service of TRISTIAN Davis. Transferred to bed comfortably with 4 persons assist. Admission 
routine done. Initial skin assessment done, family claimed patient has sacral redness. Photo 
taken and documented, wound consult triggered. Kept on bed clean, dry and comfortable. Used 
log roll and 2 persons assist in cleaning the patient. On aspiration and seizure 
precautions. Sign posted above head board visible to all HCPs. Admission orders noted and 
carried out. Kept on bed clean, dry and comfortable. Call light within reach. Will continue 
to monitor accordingly.

## 2020-02-11 VITALS — SYSTOLIC BLOOD PRESSURE: 151 MMHG | DIASTOLIC BLOOD PRESSURE: 92 MMHG

## 2020-02-11 VITALS — SYSTOLIC BLOOD PRESSURE: 130 MMHG | DIASTOLIC BLOOD PRESSURE: 83 MMHG

## 2020-02-11 VITALS — SYSTOLIC BLOOD PRESSURE: 131 MMHG | DIASTOLIC BLOOD PRESSURE: 96 MMHG

## 2020-02-11 VITALS — DIASTOLIC BLOOD PRESSURE: 84 MMHG | SYSTOLIC BLOOD PRESSURE: 135 MMHG

## 2020-02-11 VITALS — SYSTOLIC BLOOD PRESSURE: 122 MMHG | DIASTOLIC BLOOD PRESSURE: 65 MMHG

## 2020-02-11 LAB
BASOPHILS # BLD AUTO: 0.1 /CMM (ref 0–0.2)
BASOPHILS NFR BLD AUTO: 1 % (ref 0–2)
BUN SERPL-MCNC: 5 MG/DL (ref 7–18)
CALCIUM SERPL-MCNC: 8.6 MG/DL (ref 8.5–10.1)
CHLORIDE SERPL-SCNC: 100 MMOL/L (ref 98–107)
CO2 SERPL-SCNC: 24 MMOL/L (ref 21–32)
CREAT SERPL-MCNC: 0.4 MG/DL (ref 0.6–1.3)
EOSINOPHIL NFR BLD AUTO: 0.7 % (ref 0–6)
GLUCOSE SERPL-MCNC: 95 MG/DL (ref 74–106)
HCT VFR BLD AUTO: 40 % (ref 33–45)
HGB BLD-MCNC: 13.3 G/DL (ref 11.5–14.8)
LYMPHOCYTES NFR BLD AUTO: 1.5 /CMM (ref 0.8–4.8)
LYMPHOCYTES NFR BLD AUTO: 22.2 % (ref 20–44)
MAGNESIUM SERPL-MCNC: 1.8 MG/DL (ref 1.8–2.4)
MCHC RBC AUTO-ENTMCNC: 33 G/DL (ref 31–36)
MCV RBC AUTO: 96 FL (ref 82–100)
MONOCYTES NFR BLD AUTO: 0.3 /CMM (ref 0.1–1.3)
MONOCYTES NFR BLD AUTO: 4.4 % (ref 2–12)
NEUTROPHILS # BLD AUTO: 4.7 /CMM (ref 1.8–8.9)
NEUTROPHILS NFR BLD AUTO: 71.7 % (ref 43–81)
PHOSPHATE SERPL-MCNC: 3.3 MG/DL (ref 2.5–4.9)
PLATELET # BLD AUTO: 129 /CMM (ref 150–450)
POTASSIUM SERPL-SCNC: 3.9 MMOL/L (ref 3.5–5.1)
RBC # BLD AUTO: 4.21 MIL/UL (ref 4–5.2)
SODIUM SERPL-SCNC: 133 MMOL/L (ref 136–145)
WBC NRBC COR # BLD AUTO: 6.6 K/UL (ref 4.3–11)

## 2020-02-11 RX ADMIN — Medication SCH OZ: at 13:02

## 2020-02-11 RX ADMIN — CALCIUM CARBONATE-CHOLECALCIFEROL TAB 250 MG-125 UNIT SCH UDTAB: 250-125 TAB at 09:00

## 2020-02-11 RX ADMIN — OXYCODONE HYDROCHLORIDE AND ACETAMINOPHEN SCH MG: 500 TABLET ORAL at 09:00

## 2020-02-11 RX ADMIN — FERROUS SULFATE TAB 325 MG (65 MG ELEMENTAL FE) SCH MG: 325 (65 FE) TAB at 09:00

## 2020-02-11 RX ADMIN — PIPERACILLIN SODIUM AND TAZOBACTAM SODIUM SCH MLS/HR: .375; 3 INJECTION, POWDER, LYOPHILIZED, FOR SOLUTION INTRAVENOUS at 00:46

## 2020-02-11 RX ADMIN — Medication SCH ML: at 08:30

## 2020-02-11 RX ADMIN — LEVETIRACETAM SCH MG: 100 SOLUTION ORAL at 21:12

## 2020-02-11 RX ADMIN — PANTOPRAZOLE SODIUM SCH MG: 40 TABLET, DELAYED RELEASE ORAL at 07:30

## 2020-02-11 RX ADMIN — Medication SCH ML: at 17:43

## 2020-02-11 RX ADMIN — Medication SCH MG: at 09:00

## 2020-02-11 RX ADMIN — PANTOPRAZOLE SODIUM SCH MG: 40 TABLET, DELAYED RELEASE ORAL at 13:02

## 2020-02-11 RX ADMIN — FOLIC ACID SCH MG: 1 TABLET ORAL at 09:00

## 2020-02-11 RX ADMIN — LACTOBACILLUS TAB SCH EACH: TAB at 16:31

## 2020-02-11 RX ADMIN — ACETAMINOPHEN SCH MG: 325 TABLET ORAL at 09:00

## 2020-02-11 RX ADMIN — PIPERACILLIN SODIUM AND TAZOBACTAM SODIUM SCH MLS/HR: .375; 3 INJECTION, POWDER, LYOPHILIZED, FOR SOLUTION INTRAVENOUS at 08:30

## 2020-02-11 RX ADMIN — SODIUM CHLORIDE PRN MLS/HR: 9 INJECTION, SOLUTION INTRAVENOUS at 16:28

## 2020-02-11 RX ADMIN — CALCIUM CARBONATE-CHOLECALCIFEROL TAB 250 MG-125 UNIT SCH UDTAB: 250-125 TAB at 16:31

## 2020-02-11 RX ADMIN — Medication SCH MG: at 16:31

## 2020-02-11 RX ADMIN — LACTOBACILLUS TAB SCH EACH: TAB at 08:00

## 2020-02-11 RX ADMIN — PIPERACILLIN SODIUM AND TAZOBACTAM SODIUM SCH MLS/HR: .375; 3 INJECTION, POWDER, LYOPHILIZED, FOR SOLUTION INTRAVENOUS at 16:11

## 2020-02-11 RX ADMIN — THERA TABS SCH UDTAB: TAB at 09:00

## 2020-02-11 RX ADMIN — RIVAROXABAN SCH MG: 10 TABLET, FILM COATED ORAL at 16:32

## 2020-02-11 NOTE — NUR
TELE RN CLOSING NOTES



Patient asleep, easily awaken. On RA, no SOB/respiratory distress noted. Afebrile the whole 
shift, on tele monitor with NSR noted. No seizure noted within the shift. All nursing needs 
attended. Due meds given as ordered. On fall, aspiration, and seizure precautions. Kept on 
bed clean, dry and comfortable. Call light within easy reach. Caregiver at bedside the whole 
shift. Endorsed.

## 2020-02-11 NOTE — NUR
MS RN NOTES



Diaper changed PRN. Perineal care done. Turned and repositioned patient per protocol. 
Suctioned PRN, scant saliva noted. Patient comfortable position, no SOB/respiration 
distress, no s/sx of discomfort noted.

## 2020-02-11 NOTE — NUR
RN MS NOTES

PT IN BED, RESTING, NON VERBAL, NO SIGN OF PAIN OR DISTRESS, CAREGIVER AT BEDSIDE, DAUGHTER 
PREFERS PT TO HAVE SWALLOW EVAL TOMORROW, ASPIRATION PRECAUTIONS OBSERVED, SUCTIONED MOUTH 
SECRETIONS AS NEEDED, IV FLUIDS INFUSING WELL, PM CARE PROVIDED, TURNED AND REPOSITIONED Q2 
HOURS, KEPT CLEAN AND DRY AT ALL TIMES, ALL NEEDS ATTENDED.

## 2020-02-11 NOTE — NUR
WOUND CARE CONSULT: PT FOLLOWED BY PLASTIC SURGERY TEAM FOR WOUND CARE. DEFER TO SURGICAL 
TEAM FOR WOUND TREATMENT PLAN. ALL SKIN PROTECTION RECOMMENDATIONS DISCUSSED WITH NURSING 
STAFF. PT INCONTINENT WITH CURRENT WALTER SCORE OF 10. LORI ISOFLEX LOW AIRLOSS BED TO BE 
PLACED.

## 2020-02-11 NOTE — NUR
RN MS NOTES

PT SEEN AND EXAMINED BY DR. SIERRA, PLAN OF CARE DISCUSSED WITH DAUGHTERS OVER THE PHONE, 
VERBALIZED UNDERSTANDING.

## 2020-02-11 NOTE — NUR
MS RN OPENING NOTES



Received patient asleep, easily awaken. On RA, no SOB/respiratory distress noted. No 
complaints noted at this time. Kept on bed clean, dry and comfortable. Call light within 
easy reach. On fall, seizure and aspiration precautions. Will continue to monitor 
accordingly.

## 2020-02-11 NOTE — NUR
RN MS NOTES

PT IN BED, NON VERBAL, UNABLE TO FOLLOW DIRECTIONS, UNABLE TO GIVE PO MEDS, RISK FOR 
ASPIRATION, SPEECH THERAPIST CAME TO SEE PT, DAUGHTER WANTS ST EVAL TO BE DONE TOMORROW.

## 2020-02-11 NOTE — NUR
RN TELE NOTES

PT IN BED, ASLEEP, EASY TO AROUSE, NO SIGN OF PAIN OR DISTRESS, CALL LIGHT WITHIN REACH, 
CAREGIVER AT BEDSIDE, KEPT WARM AND COMFORTABLE IN BED.

## 2020-02-12 VITALS — SYSTOLIC BLOOD PRESSURE: 138 MMHG | DIASTOLIC BLOOD PRESSURE: 60 MMHG

## 2020-02-12 VITALS — DIASTOLIC BLOOD PRESSURE: 99 MMHG | SYSTOLIC BLOOD PRESSURE: 149 MMHG

## 2020-02-12 VITALS — DIASTOLIC BLOOD PRESSURE: 77 MMHG | SYSTOLIC BLOOD PRESSURE: 124 MMHG

## 2020-02-12 LAB
BASOPHILS # BLD AUTO: 0.1 /CMM (ref 0–0.2)
BASOPHILS NFR BLD AUTO: 1.2 % (ref 0–2)
BUN SERPL-MCNC: 3 MG/DL (ref 7–18)
CALCIUM SERPL-MCNC: 8.8 MG/DL (ref 8.5–10.1)
CHLORIDE SERPL-SCNC: 100 MMOL/L (ref 98–107)
CO2 SERPL-SCNC: 25 MMOL/L (ref 21–32)
CREAT SERPL-MCNC: 0.4 MG/DL (ref 0.6–1.3)
EOSINOPHIL NFR BLD AUTO: 1.7 % (ref 0–6)
GLUCOSE SERPL-MCNC: 92 MG/DL (ref 74–106)
HCT VFR BLD AUTO: 39 % (ref 33–45)
HGB BLD-MCNC: 13 G/DL (ref 11.5–14.8)
LYMPHOCYTES NFR BLD AUTO: 1.3 /CMM (ref 0.8–4.8)
LYMPHOCYTES NFR BLD AUTO: 26 % (ref 20–44)
MCHC RBC AUTO-ENTMCNC: 34 G/DL (ref 31–36)
MCV RBC AUTO: 96 FL (ref 82–100)
MONOCYTES NFR BLD AUTO: 0.3 /CMM (ref 0.1–1.3)
MONOCYTES NFR BLD AUTO: 5.5 % (ref 2–12)
NEUTROPHILS # BLD AUTO: 3.3 /CMM (ref 1.8–8.9)
NEUTROPHILS NFR BLD AUTO: 65.6 % (ref 43–81)
PLATELET # BLD AUTO: 138 /CMM (ref 150–450)
POTASSIUM SERPL-SCNC: 3.4 MMOL/L (ref 3.5–5.1)
RBC # BLD AUTO: 4.04 MIL/UL (ref 4–5.2)
SODIUM SERPL-SCNC: 134 MMOL/L (ref 136–145)
WBC NRBC COR # BLD AUTO: 5.1 K/UL (ref 4.3–11)

## 2020-02-12 RX ADMIN — LACTOBACILLUS TAB SCH EACH: TAB at 16:31

## 2020-02-12 RX ADMIN — OXYCODONE HYDROCHLORIDE AND ACETAMINOPHEN SCH MG: 500 TABLET ORAL at 08:21

## 2020-02-12 RX ADMIN — Medication SCH DROP: at 16:39

## 2020-02-12 RX ADMIN — SODIUM CHLORIDE PRN MLS/HR: 9 INJECTION, SOLUTION INTRAVENOUS at 21:50

## 2020-02-12 RX ADMIN — LEVETIRACETAM SCH MG: 100 SOLUTION ORAL at 21:32

## 2020-02-12 RX ADMIN — CALCIUM CARBONATE-CHOLECALCIFEROL TAB 250 MG-125 UNIT SCH UDTAB: 250-125 TAB at 16:32

## 2020-02-12 RX ADMIN — RIVAROXABAN SCH MG: 10 TABLET, FILM COATED ORAL at 16:34

## 2020-02-12 RX ADMIN — THERA TABS SCH UDTAB: TAB at 08:21

## 2020-02-12 RX ADMIN — PIPERACILLIN SODIUM AND TAZOBACTAM SODIUM SCH MLS/HR: .375; 3 INJECTION, POWDER, LYOPHILIZED, FOR SOLUTION INTRAVENOUS at 08:20

## 2020-02-12 RX ADMIN — Medication PRN OZ: at 08:50

## 2020-02-12 RX ADMIN — Medication SCH OZ: at 08:53

## 2020-02-12 RX ADMIN — PIPERACILLIN SODIUM AND TAZOBACTAM SODIUM SCH MLS/HR: .375; 3 INJECTION, POWDER, LYOPHILIZED, FOR SOLUTION INTRAVENOUS at 16:37

## 2020-02-12 RX ADMIN — LACTOBACILLUS TAB SCH EACH: TAB at 08:21

## 2020-02-12 RX ADMIN — Medication SCH MG: at 16:38

## 2020-02-12 RX ADMIN — Medication SCH DROP: at 08:32

## 2020-02-12 RX ADMIN — ACETAMINOPHEN SCH MG: 325 TABLET ORAL at 08:23

## 2020-02-12 RX ADMIN — FOLIC ACID SCH MG: 1 TABLET ORAL at 08:21

## 2020-02-12 RX ADMIN — CALCIUM CARBONATE-CHOLECALCIFEROL TAB 250 MG-125 UNIT SCH UDTAB: 250-125 TAB at 08:21

## 2020-02-12 RX ADMIN — Medication SCH MG: at 08:21

## 2020-02-12 RX ADMIN — PANTOPRAZOLE SODIUM SCH MG: 40 TABLET, DELAYED RELEASE ORAL at 08:21

## 2020-02-12 RX ADMIN — PIPERACILLIN SODIUM AND TAZOBACTAM SODIUM SCH MLS/HR: .375; 3 INJECTION, POWDER, LYOPHILIZED, FOR SOLUTION INTRAVENOUS at 00:37

## 2020-02-12 RX ADMIN — FERROUS SULFATE TAB 325 MG (65 MG ELEMENTAL FE) SCH MG: 325 (65 FE) TAB at 08:21

## 2020-02-12 NOTE — NUR
RN NOTES

RECEIVED PT. AWAKE ON BED, NON-VERBAL, CAREGIVER AT BEDSIDE, NOT IN DISTRESS, NO PAIN NOTED, 
SIDERAILSUPX2, BED IN LOCKED POSITION, CALL LIGHT WITHIN REACH,., CONTINUE TO MONITOR

## 2020-02-12 NOTE — NUR
MS RN NOTES



Cleaned the patient, diaper changed, perineal care done, sponge bath done. Patient noted 
opened her eyes, no s/sx of discomfort/distress noted. Repositioned patient Q2H. Suctioned 
PRN. Will continue to monitor accordingly.

## 2020-02-12 NOTE — NUR
MS RN NOTES



PATIENT SEEN AND EXAMINED BY DR BAR (NEURO), ALSO SPOKE WITH DAUGHTER OVER THE PHONE. NO 
NEW ORDERS AT THIS TIME. WILL CONTINUE TO MONITOR

## 2020-02-12 NOTE — NUR
MS RN NOTES



PATIENT SEEN AND EXAMINED BY DR SIERRA, ALSO SPOKE WITH DAUGHTER VIA PHONE WITH CAREGIVER. 
WITH ORDER FOR BLE VENOUS DUPLEX TO R/O DVT. NOTED AND CARRIED OUT. WILL CONTINUE TO MONITOR

## 2020-02-12 NOTE — NUR
MS RN NOTES



PATIENT RECEIVED IN BED RESTING, EASILY AWAKEN BY NAME AND LIGHT TOUCH. PATIENT ON ROOM AIR, 
WITH NO RESPIRATORY DISTRESS PRESENT, NO SIGNS OF SOB, AND WITH NON-LABORED BREATHING. 
PATIENT SKIN DRY, INTACT, AND WARM, IV ACCESS INTACT AND PATENT. PROVIDED COMFORT MEASURES. 
SAFETY PRECAUTIONS IN PLACE, WITH BED IN THE LOWEST POSITION, BED LOCKED, BED ALARM ON, 
BILATERAL SIDE RAILS UP, AND CALL LIGHT WITHIN EASY REACH. WILL CONTINUE TO MONITOR PATIENT.

## 2020-02-12 NOTE — NUR
MS RN NOTES



RECEIVED CALL BACK FROM DR SIERRA, MADE AWARE OF VENOUS DOPPLER RESULT, 'Increased extent 
of left lower extremity deep venous thrombosis.', NO NEW ORDERS AT THIS TIME. WILL CONTINUE 
TO MONITOR

## 2020-02-12 NOTE — NUR
MS RN CLOSING NOTES



Patient asleep, easily awaken. On RA, no SOB/respiratory distress noted. Afebrile the whole 
shift, no new unusualities noted. All nursing needs attended, due meds given as ordered. 
Patient able to reposition self independently. Kept on bed clean, dry and comfortable. On 
fall and aspiration precautions. Call light within easy reach. Endorsed.

## 2020-02-12 NOTE — NUR
MS RN NOTES



RECEIVED RESULT FOR BLE DOPPLER WITH REPORT OF 'Increased extent of left lower extremity 
deep venous thrombosis.' NO REDNESS OR TENDERNESS NOTED ON PATIENT'S BLE. BLE COOL TO TOUCH. 
PLACED CALL TO [facility] AND PAGED DR SIERRA, AWAITING CALL BACK. WILL CONTINUE 
TO MONITOR.

## 2020-02-12 NOTE — NUR
MS RN NOTES 



PATIENT IN BED, RESTING. ALERT AND ORIENTED X 1. PATIENT NON-VERBAL, ACCOMPANIED BY 
CAREGIVER AT BEDSIDE. PATIENT ON ROOM AIR, WITH NO SIGNS OF RESPIRATORY DISTRESS, NO SIGNS 
OF SOB, AND WITH EVEN NON-LABORED BREATHING. PATIENT IV ACCESS INTACT AND PATENT. PATIENT 
SKIN DRY AND WARM, AND NO NEW SKIN BREAKDOWNS. PATIENT KEPT CLEAN AND DRY. PROVIDED COMFORT 
MEASURES TO PATIENT. SAFETY PRECAUTIONS IN PLACE, WITH BED IN THE LOWEST POSITION, BED LOCK, 
BED ALARM ON, BILATERAL SIDE RAILS UP, AND CALL LIGHT WITH IN EASY REACH.  WILL ENDORSE STACEY 
TO UPCOMING NURSE.

## 2020-02-12 NOTE — NUR
ms lvn initial notes

 received report from am  nurse and checked pt she's in bed awake and alert on semi fowlers 
position, with iVF NS at 75ml/hr infusing at this time. no signs of any acute distress 
noted. skin warm and dry to touch, denies any pain or any discomfort. kept her warm and 
comfortable at all times. place call light at reach,. will continue to monitor. caregiver at 
the bedside will continue to monitor. 

-------------------------------------------------------------------------------

Addendum: 02/14/20 at 0007 by ALYSHA COX LVN

-------------------------------------------------------------------------------

pls disregard wrong date documentation.

## 2020-02-13 VITALS — DIASTOLIC BLOOD PRESSURE: 84 MMHG | SYSTOLIC BLOOD PRESSURE: 150 MMHG

## 2020-02-13 VITALS — SYSTOLIC BLOOD PRESSURE: 133 MMHG | DIASTOLIC BLOOD PRESSURE: 88 MMHG

## 2020-02-13 LAB
BASOPHILS # BLD AUTO: 0.1 /CMM (ref 0–0.2)
BASOPHILS NFR BLD AUTO: 1 % (ref 0–2)
BUN SERPL-MCNC: 5 MG/DL (ref 7–18)
CALCIUM SERPL-MCNC: 8.3 MG/DL (ref 8.5–10.1)
CHLORIDE SERPL-SCNC: 102 MMOL/L (ref 98–107)
CO2 SERPL-SCNC: 25 MMOL/L (ref 21–32)
CREAT SERPL-MCNC: 0.3 MG/DL (ref 0.6–1.3)
EOSINOPHIL NFR BLD AUTO: 1.5 % (ref 0–6)
GLUCOSE SERPL-MCNC: 108 MG/DL (ref 74–106)
HCT VFR BLD AUTO: 39 % (ref 33–45)
HGB BLD-MCNC: 13.1 G/DL (ref 11.5–14.8)
LYMPHOCYTES NFR BLD AUTO: 1.6 /CMM (ref 0.8–4.8)
LYMPHOCYTES NFR BLD AUTO: 28.2 % (ref 20–44)
MCHC RBC AUTO-ENTMCNC: 33 G/DL (ref 31–36)
MCV RBC AUTO: 96 FL (ref 82–100)
MONOCYTES NFR BLD AUTO: 0.3 /CMM (ref 0.1–1.3)
MONOCYTES NFR BLD AUTO: 5.6 % (ref 2–12)
NEUTROPHILS # BLD AUTO: 3.7 /CMM (ref 1.8–8.9)
NEUTROPHILS NFR BLD AUTO: 63.7 % (ref 43–81)
PLATELET # BLD AUTO: 127 /CMM (ref 150–450)
POTASSIUM SERPL-SCNC: 3.4 MMOL/L (ref 3.5–5.1)
RBC # BLD AUTO: 4.08 MIL/UL (ref 4–5.2)
SODIUM SERPL-SCNC: 137 MMOL/L (ref 136–145)
WBC NRBC COR # BLD AUTO: 5.8 K/UL (ref 4.3–11)

## 2020-02-13 RX ADMIN — SODIUM CHLORIDE PRN MLS/HR: 9 INJECTION, SOLUTION INTRAVENOUS at 23:48

## 2020-02-13 RX ADMIN — PIPERACILLIN SODIUM AND TAZOBACTAM SODIUM SCH MLS/HR: .375; 3 INJECTION, POWDER, LYOPHILIZED, FOR SOLUTION INTRAVENOUS at 16:06

## 2020-02-13 RX ADMIN — LEVETIRACETAM SCH MG: 100 SOLUTION ORAL at 22:37

## 2020-02-13 RX ADMIN — Medication PRN OZ: at 09:33

## 2020-02-13 RX ADMIN — FERROUS SULFATE TAB 325 MG (65 MG ELEMENTAL FE) SCH MG: 325 (65 FE) TAB at 08:42

## 2020-02-13 RX ADMIN — Medication SCH OZ: at 09:36

## 2020-02-13 RX ADMIN — FOLIC ACID SCH MG: 1 TABLET ORAL at 08:42

## 2020-02-13 RX ADMIN — THERA TABS SCH UDTAB: TAB at 08:41

## 2020-02-13 RX ADMIN — RIVAROXABAN SCH MG: 10 TABLET, FILM COATED ORAL at 16:08

## 2020-02-13 RX ADMIN — CALCIUM CARBONATE-CHOLECALCIFEROL TAB 250 MG-125 UNIT SCH UDTAB: 250-125 TAB at 08:42

## 2020-02-13 RX ADMIN — Medication SCH DROP: at 16:07

## 2020-02-13 RX ADMIN — CALCIUM CARBONATE-CHOLECALCIFEROL TAB 250 MG-125 UNIT SCH UDTAB: 250-125 TAB at 16:07

## 2020-02-13 RX ADMIN — PANTOPRAZOLE SODIUM SCH MG: 40 TABLET, DELAYED RELEASE ORAL at 08:42

## 2020-02-13 RX ADMIN — ACETAMINOPHEN SCH MG: 325 TABLET ORAL at 08:42

## 2020-02-13 RX ADMIN — Medication SCH MG: at 08:42

## 2020-02-13 RX ADMIN — OXYCODONE HYDROCHLORIDE AND ACETAMINOPHEN SCH MG: 500 TABLET ORAL at 08:42

## 2020-02-13 RX ADMIN — LACTOBACILLUS TAB SCH EACH: TAB at 16:07

## 2020-02-13 RX ADMIN — PIPERACILLIN SODIUM AND TAZOBACTAM SODIUM SCH MLS/HR: .375; 3 INJECTION, POWDER, LYOPHILIZED, FOR SOLUTION INTRAVENOUS at 08:48

## 2020-02-13 RX ADMIN — LACTOBACILLUS TAB SCH EACH: TAB at 08:41

## 2020-02-13 RX ADMIN — Medication SCH DROP: at 08:48

## 2020-02-13 RX ADMIN — Medication SCH MG: at 16:07

## 2020-02-13 RX ADMIN — PIPERACILLIN SODIUM AND TAZOBACTAM SODIUM SCH MLS/HR: .375; 3 INJECTION, POWDER, LYOPHILIZED, FOR SOLUTION INTRAVENOUS at 00:27

## 2020-02-13 NOTE — NUR
MS RN NOTES 



PATIENT IN BED. PATIENT NON-VERBAL. ACCOMPANIED BY CAREGIVER AT BEDSIDE. PATIENT ON ROOM AIR 
PRESENTS NO SIGN OF RESPIRATORY DISTRESS, NO SIGNS OF SOB, PATIENT PRESENTS EVEN NON-LABORED 
BREATHING. PATIENT KEPT CLEAN AND DRY. IV ACCESS IN PLACE, INTACT, AND PATENT. PROVIDED 
COMFORT MEASURES FOR PATIENT. PATIENT PRESENTS NO DISCOMFORT OR PAIN. SAFETY PRECAUTIONS IN 
PLACE, WITH BED IN THE LOWEST POSITION, BED LOCKED, BED ALARM ON, BILATERAL SIDE RAILS UP, 
AND CALL LIGHT WITHIN EASY REACH. WILL ENDORSE STACEY TO UPCOMING NIGHTSHIFT NURSE.

## 2020-02-13 NOTE — NUR
RN NOTES

SLEEPING BUT AROUSABLE, CAREGIVER AT BEDSIDE, NOT IN DISTRESS, NO PAIN NOTED, MORNING CARE 
RENDERED, SIDERAILSUPX2,, PT. NEEDS ATTENDED. ENDORSED TO [facility] NURSE FOR CONTINUITY OF 
CARE

## 2020-02-13 NOTE — NUR
ms lvn initial notes

 received report from am  nurse and checked pt she's in bed awake and alert on semi fowlers 
position, with iVF NS at 75ml/hr infusing at this time. no signs of any acute distress 
noted. skin warm and dry to touch, denies any pain or any discomfort. kept her warm and 
comfortable at all times. place call light at reach,. will continue to monitor. caregiver at 
the bedside will continue to monitor.

## 2020-02-13 NOTE — NUR
MS RN NOTES



PATIENT RECEIVED IN BED, RESTING COMFORTABLY, AND AWAKE, PATIENT IS NON-VERBAL, ACCOMPANIED 
BY CAREGIVER AT BEDSIDE. PATIENT ON ROOM AIR, NO SIGNS OF SOB AND NO SIGNS OF RESPIRATORY 
DEPRESSION, WITH NON-LABORED BREATHING. PATIENT SKIN CLEAN AND DRY, IV ACCESS IN PLACE AND 
INTACT. PROVIDED COMFORT MEASURES TO PATIENT. INITIATED SAFETY PRECAUTIONS WITH BED IN THE 
LOWEST POSITION, BED ALARM ON, BED LOCKED, BILATERAL SIDE RAILS UP, AND CALL LIGHT WITHIN 
EASY REACH. WILL CONTINUE TO MONITOR PATIENT.

## 2020-02-14 VITALS — SYSTOLIC BLOOD PRESSURE: 128 MMHG | DIASTOLIC BLOOD PRESSURE: 89 MMHG

## 2020-02-14 VITALS — SYSTOLIC BLOOD PRESSURE: 145 MMHG | DIASTOLIC BLOOD PRESSURE: 90 MMHG

## 2020-02-14 VITALS — SYSTOLIC BLOOD PRESSURE: 140 MMHG | DIASTOLIC BLOOD PRESSURE: 70 MMHG

## 2020-02-14 LAB
BUN SERPL-MCNC: 4 MG/DL (ref 7–18)
CALCIUM SERPL-MCNC: 8.8 MG/DL (ref 8.5–10.1)
CHLORIDE SERPL-SCNC: 102 MMOL/L (ref 98–107)
CO2 SERPL-SCNC: 24 MMOL/L (ref 21–32)
CREAT SERPL-MCNC: 0.3 MG/DL (ref 0.6–1.3)
GLUCOSE SERPL-MCNC: 95 MG/DL (ref 74–106)
POTASSIUM SERPL-SCNC: 3.1 MMOL/L (ref 3.5–5.1)
SODIUM SERPL-SCNC: 137 MMOL/L (ref 136–145)

## 2020-02-14 RX ADMIN — LEVETIRACETAM SCH MG: 100 SOLUTION ORAL at 21:08

## 2020-02-14 RX ADMIN — LACTOBACILLUS TAB SCH EACH: TAB at 08:06

## 2020-02-14 RX ADMIN — FERROUS SULFATE TAB 325 MG (65 MG ELEMENTAL FE) SCH MG: 325 (65 FE) TAB at 08:08

## 2020-02-14 RX ADMIN — PIPERACILLIN SODIUM AND TAZOBACTAM SODIUM SCH MLS/HR: .375; 3 INJECTION, POWDER, LYOPHILIZED, FOR SOLUTION INTRAVENOUS at 08:18

## 2020-02-14 RX ADMIN — FOLIC ACID SCH MG: 1 TABLET ORAL at 08:09

## 2020-02-14 RX ADMIN — LACTOBACILLUS TAB SCH EACH: TAB at 16:15

## 2020-02-14 RX ADMIN — Medication SCH OZ: at 08:17

## 2020-02-14 RX ADMIN — PIPERACILLIN SODIUM AND TAZOBACTAM SODIUM SCH MLS/HR: .375; 3 INJECTION, POWDER, LYOPHILIZED, FOR SOLUTION INTRAVENOUS at 16:15

## 2020-02-14 RX ADMIN — CALCIUM CARBONATE-CHOLECALCIFEROL TAB 250 MG-125 UNIT SCH UDTAB: 250-125 TAB at 16:16

## 2020-02-14 RX ADMIN — POTASSIUM CHLORIDE SCH MEQ: 1.5 POWDER, FOR SOLUTION ORAL at 12:12

## 2020-02-14 RX ADMIN — PANTOPRAZOLE SODIUM SCH MG: 40 TABLET, DELAYED RELEASE ORAL at 07:53

## 2020-02-14 RX ADMIN — PIPERACILLIN SODIUM AND TAZOBACTAM SODIUM SCH MLS/HR: .375; 3 INJECTION, POWDER, LYOPHILIZED, FOR SOLUTION INTRAVENOUS at 00:59

## 2020-02-14 RX ADMIN — ACETAMINOPHEN SCH MG: 325 TABLET ORAL at 08:07

## 2020-02-14 RX ADMIN — Medication SCH MG: at 08:08

## 2020-02-14 RX ADMIN — SODIUM CHLORIDE PRN MLS/HR: 9 INJECTION, SOLUTION INTRAVENOUS at 21:08

## 2020-02-14 RX ADMIN — POTASSIUM CHLORIDE SCH MEQ: 1.5 POWDER, FOR SOLUTION ORAL at 10:51

## 2020-02-14 RX ADMIN — OXYCODONE HYDROCHLORIDE AND ACETAMINOPHEN SCH MG: 500 TABLET ORAL at 08:08

## 2020-02-14 RX ADMIN — CALCIUM CARBONATE-CHOLECALCIFEROL TAB 250 MG-125 UNIT SCH UDTAB: 250-125 TAB at 08:08

## 2020-02-14 RX ADMIN — THERA TABS SCH UDTAB: TAB at 08:06

## 2020-02-14 RX ADMIN — Medication SCH DROP: at 16:19

## 2020-02-14 RX ADMIN — RIVAROXABAN SCH MG: 10 TABLET, FILM COATED ORAL at 16:17

## 2020-02-14 RX ADMIN — Medication SCH DROP: at 08:13

## 2020-02-14 RX ADMIN — Medication SCH MG: at 16:15

## 2020-02-14 NOTE — NUR
ms lvn closing notes. 

 pt remains sleeping comfortably in bed without any acute distress noted. all due meds given 
and all needs met. IVF still infusing. pt stable niurka the night . kept her warm and 
comfortable at all times. caregiver at the bedside for safety. will endorse to am nurse for 
continuity of care.

## 2020-02-14 NOTE — NUR
MS RN NOTES

PATIENT IN BED AWAKE, NON VERBAL. NO ACUTE DISTRESS NOTED. BREATHING UNLABORED. NO  SOB 
NOTED. NEEDS ATTENDED AND ANTICIPATED. KEPT CLEAN DRY AND COMFORTABLE. SAFETY MEASURES IN 
PLACE. CALL LIGHT  WITHIN REACH.  WILL ENDORSE TO NIGHT NURSE FOR CONTINUITY OF CARE

## 2020-02-14 NOTE — NUR
MS RN 

PATIENT IN BED AWAKE, NON VERBAL. STABLE AND NOT IN DISTRESS, DAUGHTER AND CAREGIVER AT 
BEDSIDE. SAFETY MEASURES IN PLACE. WILL CONTINUE TO MONITOR.

## 2020-02-14 NOTE — NUR
ms lvn notes

pt sleeping comfortably in bed without any acute distress noted. will continue monitoring.

## 2020-02-15 VITALS — SYSTOLIC BLOOD PRESSURE: 131 MMHG | DIASTOLIC BLOOD PRESSURE: 80 MMHG

## 2020-02-15 VITALS — DIASTOLIC BLOOD PRESSURE: 77 MMHG | SYSTOLIC BLOOD PRESSURE: 143 MMHG

## 2020-02-15 VITALS — DIASTOLIC BLOOD PRESSURE: 84 MMHG | SYSTOLIC BLOOD PRESSURE: 142 MMHG

## 2020-02-15 LAB
BASOPHILS # BLD AUTO: 0.1 /CMM (ref 0–0.2)
BASOPHILS NFR BLD AUTO: 1 % (ref 0–2)
BUN SERPL-MCNC: 6 MG/DL (ref 7–18)
CALCIUM SERPL-MCNC: 8.9 MG/DL (ref 8.5–10.1)
CHLORIDE SERPL-SCNC: 103 MMOL/L (ref 98–107)
CO2 SERPL-SCNC: 24 MMOL/L (ref 21–32)
CREAT SERPL-MCNC: 0.3 MG/DL (ref 0.6–1.3)
EOSINOPHIL NFR BLD AUTO: 1.4 % (ref 0–6)
GLUCOSE SERPL-MCNC: 91 MG/DL (ref 74–106)
HCT VFR BLD AUTO: 41 % (ref 33–45)
HGB BLD-MCNC: 13.8 G/DL (ref 11.5–14.8)
LYMPHOCYTES NFR BLD AUTO: 1.7 /CMM (ref 0.8–4.8)
LYMPHOCYTES NFR BLD AUTO: 30.1 % (ref 20–44)
MCHC RBC AUTO-ENTMCNC: 34 G/DL (ref 31–36)
MCV RBC AUTO: 96 FL (ref 82–100)
MONOCYTES NFR BLD AUTO: 0.3 /CMM (ref 0.1–1.3)
MONOCYTES NFR BLD AUTO: 6.2 % (ref 2–12)
NEUTROPHILS # BLD AUTO: 3.4 /CMM (ref 1.8–8.9)
NEUTROPHILS NFR BLD AUTO: 61.3 % (ref 43–81)
PLATELET # BLD AUTO: 143 /CMM (ref 150–450)
POTASSIUM SERPL-SCNC: 3.6 MMOL/L (ref 3.5–5.1)
RBC # BLD AUTO: 4.26 MIL/UL (ref 4–5.2)
SODIUM SERPL-SCNC: 138 MMOL/L (ref 136–145)
WBC NRBC COR # BLD AUTO: 5.6 K/UL (ref 4.3–11)

## 2020-02-15 RX ADMIN — CALCIUM CARBONATE-CHOLECALCIFEROL TAB 250 MG-125 UNIT SCH UDTAB: 250-125 TAB at 08:17

## 2020-02-15 RX ADMIN — PIPERACILLIN SODIUM AND TAZOBACTAM SODIUM SCH MLS/HR: .375; 3 INJECTION, POWDER, LYOPHILIZED, FOR SOLUTION INTRAVENOUS at 16:20

## 2020-02-15 RX ADMIN — Medication SCH DROP: at 16:22

## 2020-02-15 RX ADMIN — LEVETIRACETAM SCH MG: 100 SOLUTION ORAL at 21:58

## 2020-02-15 RX ADMIN — LACTOBACILLUS TAB SCH EACH: TAB at 16:20

## 2020-02-15 RX ADMIN — Medication PRN MG: at 23:47

## 2020-02-15 RX ADMIN — LACTOBACILLUS TAB SCH EACH: TAB at 08:04

## 2020-02-15 RX ADMIN — SODIUM CHLORIDE PRN MLS/HR: 9 INJECTION, SOLUTION INTRAVENOUS at 22:10

## 2020-02-15 RX ADMIN — Medication SCH DROP: at 08:16

## 2020-02-15 RX ADMIN — CALCIUM CARBONATE-CHOLECALCIFEROL TAB 250 MG-125 UNIT SCH UDTAB: 250-125 TAB at 16:20

## 2020-02-15 RX ADMIN — RIVAROXABAN SCH MG: 10 TABLET, FILM COATED ORAL at 16:34

## 2020-02-15 RX ADMIN — ACETAMINOPHEN SCH MG: 325 TABLET ORAL at 08:17

## 2020-02-15 RX ADMIN — FERROUS SULFATE TAB 325 MG (65 MG ELEMENTAL FE) SCH MG: 325 (65 FE) TAB at 08:19

## 2020-02-15 RX ADMIN — Medication SCH MG: at 16:20

## 2020-02-15 RX ADMIN — THERA TABS SCH UDTAB: TAB at 08:17

## 2020-02-15 RX ADMIN — FOLIC ACID SCH MG: 1 TABLET ORAL at 08:17

## 2020-02-15 RX ADMIN — Medication SCH OZ: at 08:25

## 2020-02-15 RX ADMIN — PIPERACILLIN SODIUM AND TAZOBACTAM SODIUM SCH MLS/HR: .375; 3 INJECTION, POWDER, LYOPHILIZED, FOR SOLUTION INTRAVENOUS at 23:41

## 2020-02-15 RX ADMIN — PIPERACILLIN SODIUM AND TAZOBACTAM SODIUM SCH MLS/HR: .375; 3 INJECTION, POWDER, LYOPHILIZED, FOR SOLUTION INTRAVENOUS at 08:04

## 2020-02-15 RX ADMIN — Medication SCH MG: at 08:17

## 2020-02-15 RX ADMIN — OXYCODONE HYDROCHLORIDE AND ACETAMINOPHEN SCH MG: 500 TABLET ORAL at 08:17

## 2020-02-15 RX ADMIN — PIPERACILLIN SODIUM AND TAZOBACTAM SODIUM SCH MLS/HR: .375; 3 INJECTION, POWDER, LYOPHILIZED, FOR SOLUTION INTRAVENOUS at 00:02

## 2020-02-15 RX ADMIN — PANTOPRAZOLE SODIUM SCH MG: 40 TABLET, DELAYED RELEASE ORAL at 07:57

## 2020-02-15 NOTE — NUR
MS/RN Closing note



Patient is in bed, sleeping comfortably. No adverse reaction from IV ATB therapy, skin is 
warm to touch, clean/dry. Respiratory even and unlabored with room air. Kept lower position 
of the bed with elevated HOB. Call light within reach, will endorse night shift.

## 2020-02-15 NOTE — NUR
MS RN



PT SLEPT WELL, CAREGIVER AT BEDSIDE. STABLE AND NOT IN DISTRESS, NEEDS ATTENDED AND 
ANTICIPATED, KEPT CLEAN, DRY AND COMFORTABLE. AM CARE RENDERED. OFFLOAD HEELS AND ELBOWS. 
SAFETY MEASURES AT ALL TIMES. WILL ENDORSE NEXT SHIFT POC.

## 2020-02-15 NOTE — NUR
NOTED PT SHORTNESS OF BREATH. O2 SAT AT 89% R.A ,APPLIED 2LPM VIA NC O2 SAT 93%. SUCTION PT 
TOLERATED PROCEDURE WELL. PAGED RT FOR BREATHING TREATMENT

## 2020-02-15 NOTE — NUR
MS RN 



PATIENT IN BED AWAKE, NON VERBAL. STABLE NO C/O OF PAIN. CAREGIVER AT BEDSIDE, SAFETY 
MEASURES IN PLACE. WILL CONTINUE TO MONITOR.

## 2020-02-15 NOTE — NUR
MS/RN Opening Note



Received patient AO x 2, able to resends all stimuli. Respiratory even and unlabored, no sob 
or respiratory distress observed with room air. NO appears pain or discomfort. Skin is warm 
to touch, kept clean.dry, intact IV site. No s/s of adverse reaction from ATB therapy. 
Encouraged patient to oral fluid intake as tolerated, call light within reach, will continue 
to monitor.

## 2020-02-16 VITALS — DIASTOLIC BLOOD PRESSURE: 85 MMHG | SYSTOLIC BLOOD PRESSURE: 124 MMHG

## 2020-02-16 VITALS — DIASTOLIC BLOOD PRESSURE: 78 MMHG | SYSTOLIC BLOOD PRESSURE: 130 MMHG

## 2020-02-16 VITALS — DIASTOLIC BLOOD PRESSURE: 83 MMHG | SYSTOLIC BLOOD PRESSURE: 127 MMHG

## 2020-02-16 VITALS — SYSTOLIC BLOOD PRESSURE: 144 MMHG | DIASTOLIC BLOOD PRESSURE: 88 MMHG

## 2020-02-16 LAB
BASOPHILS # BLD AUTO: 0.1 /CMM (ref 0–0.2)
BASOPHILS NFR BLD AUTO: 0.8 % (ref 0–2)
BUN SERPL-MCNC: 10 MG/DL (ref 7–18)
CALCIUM SERPL-MCNC: 8.8 MG/DL (ref 8.5–10.1)
CHLORIDE SERPL-SCNC: 103 MMOL/L (ref 98–107)
CO2 SERPL-SCNC: 26 MMOL/L (ref 21–32)
CREAT SERPL-MCNC: 0.3 MG/DL (ref 0.6–1.3)
EOSINOPHIL NFR BLD AUTO: 1.3 % (ref 0–6)
GLUCOSE SERPL-MCNC: 98 MG/DL (ref 74–106)
HCT VFR BLD AUTO: 41 % (ref 33–45)
HGB BLD-MCNC: 13.6 G/DL (ref 11.5–14.8)
LYMPHOCYTES NFR BLD AUTO: 1.4 /CMM (ref 0.8–4.8)
LYMPHOCYTES NFR BLD AUTO: 18.3 % (ref 20–44)
MCHC RBC AUTO-ENTMCNC: 33 G/DL (ref 31–36)
MCV RBC AUTO: 96 FL (ref 82–100)
MONOCYTES NFR BLD AUTO: 0.4 /CMM (ref 0.1–1.3)
MONOCYTES NFR BLD AUTO: 5.4 % (ref 2–12)
NEUTROPHILS # BLD AUTO: 5.8 /CMM (ref 1.8–8.9)
NEUTROPHILS NFR BLD AUTO: 74.2 % (ref 43–81)
PLATELET # BLD AUTO: 146 /CMM (ref 150–450)
POTASSIUM SERPL-SCNC: 3.4 MMOL/L (ref 3.5–5.1)
RBC # BLD AUTO: 4.26 MIL/UL (ref 4–5.2)
SODIUM SERPL-SCNC: 138 MMOL/L (ref 136–145)
WBC NRBC COR # BLD AUTO: 7.8 K/UL (ref 4.3–11)

## 2020-02-16 RX ADMIN — OXYCODONE HYDROCHLORIDE AND ACETAMINOPHEN SCH MG: 500 TABLET ORAL at 08:19

## 2020-02-16 RX ADMIN — Medication SCH MG: at 16:38

## 2020-02-16 RX ADMIN — LACTOBACILLUS TAB SCH EACH: TAB at 08:13

## 2020-02-16 RX ADMIN — RIVAROXABAN SCH MG: 10 TABLET, FILM COATED ORAL at 16:39

## 2020-02-16 RX ADMIN — LEVETIRACETAM SCH MG: 100 SOLUTION ORAL at 21:44

## 2020-02-16 RX ADMIN — PANTOPRAZOLE SODIUM SCH MG: 40 TABLET, DELAYED RELEASE ORAL at 08:13

## 2020-02-16 RX ADMIN — FOLIC ACID SCH MG: 1 TABLET ORAL at 08:19

## 2020-02-16 RX ADMIN — Medication SCH OZ: at 08:21

## 2020-02-16 RX ADMIN — ACETAMINOPHEN SCH MG: 325 TABLET ORAL at 08:19

## 2020-02-16 RX ADMIN — CALCIUM CARBONATE-CHOLECALCIFEROL TAB 250 MG-125 UNIT SCH UDTAB: 250-125 TAB at 16:38

## 2020-02-16 RX ADMIN — THERA TABS SCH UDTAB: TAB at 08:19

## 2020-02-16 RX ADMIN — Medication SCH MG: at 08:19

## 2020-02-16 RX ADMIN — PIPERACILLIN SODIUM AND TAZOBACTAM SODIUM SCH MLS/HR: .375; 3 INJECTION, POWDER, LYOPHILIZED, FOR SOLUTION INTRAVENOUS at 16:37

## 2020-02-16 RX ADMIN — Medication SCH DROP: at 08:21

## 2020-02-16 RX ADMIN — PIPERACILLIN SODIUM AND TAZOBACTAM SODIUM SCH MLS/HR: .375; 3 INJECTION, POWDER, LYOPHILIZED, FOR SOLUTION INTRAVENOUS at 23:02

## 2020-02-16 RX ADMIN — PIPERACILLIN SODIUM AND TAZOBACTAM SODIUM SCH MLS/HR: .375; 3 INJECTION, POWDER, LYOPHILIZED, FOR SOLUTION INTRAVENOUS at 09:37

## 2020-02-16 RX ADMIN — CALCIUM CARBONATE-CHOLECALCIFEROL TAB 250 MG-125 UNIT SCH UDTAB: 250-125 TAB at 08:19

## 2020-02-16 RX ADMIN — Medication SCH DROP: at 16:40

## 2020-02-16 RX ADMIN — LACTOBACILLUS TAB SCH EACH: TAB at 16:39

## 2020-02-16 RX ADMIN — FERROUS SULFATE TAB 325 MG (65 MG ELEMENTAL FE) SCH MG: 325 (65 FE) TAB at 08:19

## 2020-02-16 NOTE — NUR
MS RN



NO SIGNIFICANT CHANGES, PT SLEPT WELL. ON 2LPM VIA NC 02 SAT 94%. CAREGIVER AT BEDSIDE. 
NEEDS ATTENDED AND ANTICIPATED, KEPT CLEAN, DRY AND COMFORTABLE. AM CARE RENDERED. 
REPOSITION EVERY 2 HOURS. OFFLOAD HEELS AND ELBOWS. SAFETY MEASURES AT ALL TIMES. WILL 
ENDORSE NEXT SHIFT POC.

## 2020-02-16 NOTE — NUR
MS/RN Opening Note



Received patient in bed, AO x 1, able to resends physical stimuli. Respiratory even and 
unlabored, no sob or respiratory distress observed with oxygen at 3LPM. NO appears pain or 
discomfort. Skin is warm to touch, kept clean.dry. No s/s of adverse reaction from ATE 
therapy. Encouraged patient to oral fluid intake as tolerated, call light within reach, will 
continue to monitor.

## 2020-02-16 NOTE — NUR
MS/RN Closing note



Patient is in bed, caregiver at bed side. No adverse reaction from IV ATB therapy, O2sat 
94-93 % in room air without SOB or distress, skin is warm to touch, clean/dry. Kept lower 
position of the bed with elevated HOB. Call light within reach, will endorse night shift.

## 2020-02-16 NOTE — NUR
RN MS NOTES

PT RESTING IN BED, AWAKE, ALERT AND ORIENTED, NO COMPLAINT OF PAIN, BREATHING PATTERN 
NORMAL, CALL LIGHT WITHIN REACH, CARE GIVER AT BED SIDE, KEPT WARM AND COMFORTABLE, WILL 
CONTINUITY WITH CARE.

## 2020-02-17 VITALS — DIASTOLIC BLOOD PRESSURE: 74 MMHG | SYSTOLIC BLOOD PRESSURE: 135 MMHG

## 2020-02-17 VITALS — DIASTOLIC BLOOD PRESSURE: 79 MMHG | SYSTOLIC BLOOD PRESSURE: 151 MMHG

## 2020-02-17 VITALS — SYSTOLIC BLOOD PRESSURE: 121 MMHG | DIASTOLIC BLOOD PRESSURE: 70 MMHG

## 2020-02-17 LAB
BASOPHILS # BLD AUTO: 0.1 /CMM (ref 0–0.2)
BASOPHILS NFR BLD AUTO: 0.9 % (ref 0–2)
BUN SERPL-MCNC: 7 MG/DL (ref 7–18)
CALCIUM SERPL-MCNC: 8.9 MG/DL (ref 8.5–10.1)
CHLORIDE SERPL-SCNC: 103 MMOL/L (ref 98–107)
CO2 SERPL-SCNC: 25 MMOL/L (ref 21–32)
CREAT SERPL-MCNC: 0.3 MG/DL (ref 0.6–1.3)
EOSINOPHIL NFR BLD AUTO: 2 % (ref 0–6)
GLUCOSE SERPL-MCNC: 96 MG/DL (ref 74–106)
HCT VFR BLD AUTO: 40 % (ref 33–45)
HGB BLD-MCNC: 13.2 G/DL (ref 11.5–14.8)
LYMPHOCYTES NFR BLD AUTO: 1.5 /CMM (ref 0.8–4.8)
LYMPHOCYTES NFR BLD AUTO: 23.2 % (ref 20–44)
MCHC RBC AUTO-ENTMCNC: 33 G/DL (ref 31–36)
MCV RBC AUTO: 96 FL (ref 82–100)
MONOCYTES NFR BLD AUTO: 0.3 /CMM (ref 0.1–1.3)
MONOCYTES NFR BLD AUTO: 4.9 % (ref 2–12)
NEUTROPHILS # BLD AUTO: 4.5 /CMM (ref 1.8–8.9)
NEUTROPHILS NFR BLD AUTO: 69 % (ref 43–81)
PLATELET # BLD AUTO: 152 /CMM (ref 150–450)
POTASSIUM SERPL-SCNC: 3.7 MMOL/L (ref 3.5–5.1)
RBC # BLD AUTO: 4.15 MIL/UL (ref 4–5.2)
SODIUM SERPL-SCNC: 136 MMOL/L (ref 136–145)
WBC NRBC COR # BLD AUTO: 6.5 K/UL (ref 4.3–11)

## 2020-02-17 RX ADMIN — CALCIUM CARBONATE-CHOLECALCIFEROL TAB 250 MG-125 UNIT SCH UDTAB: 250-125 TAB at 08:54

## 2020-02-17 RX ADMIN — LACTOBACILLUS TAB SCH EACH: TAB at 08:53

## 2020-02-17 RX ADMIN — PIPERACILLIN SODIUM AND TAZOBACTAM SODIUM SCH MLS/HR: .375; 3 INJECTION, POWDER, LYOPHILIZED, FOR SOLUTION INTRAVENOUS at 15:53

## 2020-02-17 RX ADMIN — OXYCODONE HYDROCHLORIDE AND ACETAMINOPHEN SCH MG: 500 TABLET ORAL at 08:54

## 2020-02-17 RX ADMIN — THERA TABS SCH UDTAB: TAB at 08:54

## 2020-02-17 RX ADMIN — SODIUM CHLORIDE PRN MLS/HR: 9 INJECTION, SOLUTION INTRAVENOUS at 08:00

## 2020-02-17 RX ADMIN — CALCIUM CARBONATE-CHOLECALCIFEROL TAB 250 MG-125 UNIT SCH UDTAB: 250-125 TAB at 16:24

## 2020-02-17 RX ADMIN — ACETAMINOPHEN SCH MG: 325 TABLET ORAL at 08:54

## 2020-02-17 RX ADMIN — LACTOBACILLUS TAB SCH EACH: TAB at 16:24

## 2020-02-17 RX ADMIN — Medication SCH MG: at 08:53

## 2020-02-17 RX ADMIN — PIPERACILLIN SODIUM AND TAZOBACTAM SODIUM SCH MLS/HR: .375; 3 INJECTION, POWDER, LYOPHILIZED, FOR SOLUTION INTRAVENOUS at 07:47

## 2020-02-17 RX ADMIN — Medication SCH OZ: at 09:10

## 2020-02-17 RX ADMIN — RIVAROXABAN SCH MG: 10 TABLET, FILM COATED ORAL at 16:26

## 2020-02-17 RX ADMIN — Medication SCH DROP: at 16:58

## 2020-02-17 RX ADMIN — PANTOPRAZOLE SODIUM SCH MG: 40 TABLET, DELAYED RELEASE ORAL at 08:53

## 2020-02-17 RX ADMIN — FERROUS SULFATE TAB 325 MG (65 MG ELEMENTAL FE) SCH MG: 325 (65 FE) TAB at 08:53

## 2020-02-17 RX ADMIN — Medication SCH MG: at 16:24

## 2020-02-17 RX ADMIN — LEVETIRACETAM SCH MG: 100 SOLUTION ORAL at 21:42

## 2020-02-17 RX ADMIN — Medication SCH DROP: at 08:57

## 2020-02-17 RX ADMIN — FOLIC ACID SCH MG: 1 TABLET ORAL at 08:54

## 2020-02-17 NOTE — NUR
MS RN OPENING NOTES



RECEIVED PATIENT FROM MORNING SHIFT, ALERT AND ORIENTED X 1, NON-VERBAL. CAREGIVER ON 
BEDSIDE. BREATHING REGULAR AND UNLABORED ON OXYGEN AT 2L/MIN VIA NASAL CANNULA. LEFT HAND 
G22 IV LINE INTACT AND PATENT, INFUSING WELL WITH NO BLEEDING OR S/S OF INFILTRATION NOTED. 
NO S/S OF PAIN/DISCOMFORT OBSERVED AS OF THE TIME. BED LOW AND LOCKED ON SEMI FOWLERS 
POSITION. CALL LIGHT IN REACH. WILL CONTINUE TO MONITOR.

## 2020-02-17 NOTE — NUR
RN NOTES



PATIENT IN BED RESTING COMFORTABLY IN MODERATE HIGH BACK REST. A/O X 1. CAREGIVER AT BED 
SIDE. NO SIGNS OF DISTRESS NOTED THROUGHOUT THE SHIFT. IV FLUIDS ON LEFT HAND #22 WITH NS 
RUNNING @ 75 ML/HR. PATENT AND INTACT. SAFETY MEASURES IN PLACE, BED IN LOW LOCKED POSITION 
WITH SIDE RAILS UP X2. CALL LIGHT WITHIN REACH. WILL ENDORSE TO NIGHT SHIFT NURSE FOR STACEY.

## 2020-02-17 NOTE — NUR
MS RN NOTES



DAUGHTER CALLED AND ASKED REGARDING PATIENTS ANTIBIOTIC TREATMENT, WISHES TO SPEAK TO MD 
TOMORROW. CHARGE NURSE AWARE. WILL ENDORSE TO MORNING SHIFT FOR STACEY.

## 2020-02-17 NOTE — NUR
RN MS NOTES

PT.RESTING IN BED, AWAKE, ALERT AND ORIENTED, NO COMPLAINT OF PAIN, BREATHING PATTERN 
NORMAL, CALL LIGHT WITHIN REACH, IV FLUIDS INFUSING WELL,CARE GIVER AT BED SIDE, ALL NEEDS 
ATTENDED.WILL ENDORSE TO AM RN FOR CONTINUITY WITH CARE.

## 2020-02-17 NOTE — NUR
RN NOTES



RECEIVED PATIENT IN BED RESTING COMFORTABLY IN MODERATE HIGH BACK REST. A/O X 1. CAREGIVER 
AT BED SIDE. NO SIGNS OF DISTRESS NOTED AT THIS TIME. IV FLUIDS ON LEFT HAND #22 WITH NS 
RUNNING @ 75 ML/HR. PATENT AND INTACT. SAFETY MEASURES IN PLACE, BED IN LOW LOCKED POSITION 
WITH SIDE RAILS UP X2. CALL LIGHT WITHIN REACH. WILL CONTINUE TO MONITOR.

## 2020-02-18 VITALS — SYSTOLIC BLOOD PRESSURE: 142 MMHG | DIASTOLIC BLOOD PRESSURE: 56 MMHG

## 2020-02-18 VITALS — SYSTOLIC BLOOD PRESSURE: 123 MMHG | DIASTOLIC BLOOD PRESSURE: 79 MMHG

## 2020-02-18 VITALS — DIASTOLIC BLOOD PRESSURE: 83 MMHG | SYSTOLIC BLOOD PRESSURE: 155 MMHG

## 2020-02-18 RX ADMIN — CALCIUM CARBONATE-CHOLECALCIFEROL TAB 250 MG-125 UNIT SCH UDTAB: 250-125 TAB at 16:08

## 2020-02-18 RX ADMIN — PIPERACILLIN SODIUM AND TAZOBACTAM SODIUM SCH MLS/HR: .375; 3 INJECTION, POWDER, LYOPHILIZED, FOR SOLUTION INTRAVENOUS at 15:45

## 2020-02-18 RX ADMIN — LEVETIRACETAM SCH MG: 100 SOLUTION ORAL at 22:30

## 2020-02-18 RX ADMIN — OXYCODONE HYDROCHLORIDE AND ACETAMINOPHEN SCH MG: 500 TABLET ORAL at 08:48

## 2020-02-18 RX ADMIN — RIVAROXABAN SCH MG: 10 TABLET, FILM COATED ORAL at 16:07

## 2020-02-18 RX ADMIN — LACTOBACILLUS TAB SCH EACH: TAB at 07:44

## 2020-02-18 RX ADMIN — Medication SCH MG: at 16:07

## 2020-02-18 RX ADMIN — Medication SCH MG: at 08:48

## 2020-02-18 RX ADMIN — ACETAMINOPHEN SCH MG: 325 TABLET ORAL at 08:48

## 2020-02-18 RX ADMIN — THERA TABS SCH UDTAB: TAB at 08:48

## 2020-02-18 RX ADMIN — Medication SCH DROP: at 16:10

## 2020-02-18 RX ADMIN — CALCIUM CARBONATE-CHOLECALCIFEROL TAB 250 MG-125 UNIT SCH UDTAB: 250-125 TAB at 08:48

## 2020-02-18 RX ADMIN — PIPERACILLIN SODIUM AND TAZOBACTAM SODIUM SCH MLS/HR: .375; 3 INJECTION, POWDER, LYOPHILIZED, FOR SOLUTION INTRAVENOUS at 07:45

## 2020-02-18 RX ADMIN — Medication SCH OZ: at 08:49

## 2020-02-18 RX ADMIN — FERROUS SULFATE TAB 325 MG (65 MG ELEMENTAL FE) SCH MG: 325 (65 FE) TAB at 08:48

## 2020-02-18 RX ADMIN — CLONIDINE HYDROCHLORIDE PRN MG: 0.1 TABLET ORAL at 16:43

## 2020-02-18 RX ADMIN — PIPERACILLIN SODIUM AND TAZOBACTAM SODIUM SCH MLS/HR: .375; 3 INJECTION, POWDER, LYOPHILIZED, FOR SOLUTION INTRAVENOUS at 00:29

## 2020-02-18 RX ADMIN — PANTOPRAZOLE SODIUM SCH MG: 40 TABLET, DELAYED RELEASE ORAL at 07:44

## 2020-02-18 RX ADMIN — LACTOBACILLUS TAB SCH EACH: TAB at 16:08

## 2020-02-18 RX ADMIN — SODIUM CHLORIDE PRN MLS/HR: 9 INJECTION, SOLUTION INTRAVENOUS at 08:53

## 2020-02-18 RX ADMIN — Medication SCH DROP: at 08:53

## 2020-02-18 RX ADMIN — FOLIC ACID SCH MG: 1 TABLET ORAL at 08:48

## 2020-02-18 NOTE — NUR
MS/RN - End of shift summary

Patient had an episode of elevated /94, Clonidine 0.2 mg po given as ordered, latest 
/83, remain afebrile, no s/s of pain, no apparent distress, last dose of Zosyn given. 
Patient is medically cleared for discharge but son appealed, pending result. All needs 
attended. Private caregiver at bedside. Continue with current plan of care.

## 2020-02-18 NOTE — NUR
MS/RN - Assessment

Patient is awake, non-verbal, on oxygen at 2lpm via NC, no shortness of breath, no s/s of 
pain, remain afebrile. Fall, aspiration, and seizure precautions maintained. Private 
caregiver at bedside. Will continue with current medical management.

## 2020-02-18 NOTE — NUR
RN Notes



Patient awake, non verbal, HOB elevated, on room air with good saturation. IV access on left 
hand patent and intact with ongoing IVF infusing well. Plan of care discussed with care 
giver and verbalized understanding. reposition per protocol. Will continue to monitor.

## 2020-02-18 NOTE — NUR
MS RN CLOSING NOTES



PATINE IN BED ALERT AND ORIENTED X 1, NON-VERBAL. BREATHING REGULAR AND UNLABORED ON OXYGEN 
AT 2L/MIN VIA NASAL CANNULA. LEFT HAND G22 IV LINE PATENT AND INFUSING WELL. TREATMENT FOR 
SACRAL REDNESS PROVIDED. NO S/S OF PAIN/DISCOMFORT OBSERVED THE WHOLE SHIFT. BED LOW AND 
LOCKED ON SEMI FOWLERS POSITION. CALL LIGHT IN REACH. WILL ENDORSE TO MORNING SHIFT FOR STACEY.

## 2020-02-19 VITALS — DIASTOLIC BLOOD PRESSURE: 83 MMHG | SYSTOLIC BLOOD PRESSURE: 155 MMHG

## 2020-02-19 VITALS — SYSTOLIC BLOOD PRESSURE: 161 MMHG | DIASTOLIC BLOOD PRESSURE: 79 MMHG

## 2020-02-19 VITALS — DIASTOLIC BLOOD PRESSURE: 69 MMHG | SYSTOLIC BLOOD PRESSURE: 122 MMHG

## 2020-02-19 LAB
BASOPHILS # BLD AUTO: 0.1 /CMM (ref 0–0.2)
BASOPHILS NFR BLD AUTO: 0.8 % (ref 0–2)
BUN SERPL-MCNC: 7 MG/DL (ref 7–18)
CALCIUM SERPL-MCNC: 8.5 MG/DL (ref 8.5–10.1)
CHLORIDE SERPL-SCNC: 103 MMOL/L (ref 98–107)
CO2 SERPL-SCNC: 27 MMOL/L (ref 21–32)
CREAT SERPL-MCNC: 0.3 MG/DL (ref 0.6–1.3)
EOSINOPHIL NFR BLD AUTO: 1.1 % (ref 0–6)
GLUCOSE SERPL-MCNC: 103 MG/DL (ref 74–106)
HCT VFR BLD AUTO: 40 % (ref 33–45)
HGB BLD-MCNC: 13.1 G/DL (ref 11.5–14.8)
LYMPHOCYTES NFR BLD AUTO: 1.6 /CMM (ref 0.8–4.8)
LYMPHOCYTES NFR BLD AUTO: 25 % (ref 20–44)
MCHC RBC AUTO-ENTMCNC: 33 G/DL (ref 31–36)
MCV RBC AUTO: 96 FL (ref 82–100)
MONOCYTES NFR BLD AUTO: 0.3 /CMM (ref 0.1–1.3)
MONOCYTES NFR BLD AUTO: 5.3 % (ref 2–12)
NEUTROPHILS # BLD AUTO: 4.4 /CMM (ref 1.8–8.9)
NEUTROPHILS NFR BLD AUTO: 67.8 % (ref 43–81)
PLATELET # BLD AUTO: 166 /CMM (ref 150–450)
POTASSIUM SERPL-SCNC: 3.2 MMOL/L (ref 3.5–5.1)
RBC # BLD AUTO: 4.11 MIL/UL (ref 4–5.2)
SODIUM SERPL-SCNC: 137 MMOL/L (ref 136–145)
WBC NRBC COR # BLD AUTO: 6.5 K/UL (ref 4.3–11)

## 2020-02-19 RX ADMIN — Medication SCH MG: at 08:20

## 2020-02-19 RX ADMIN — ACETAMINOPHEN SCH MG: 325 TABLET ORAL at 08:16

## 2020-02-19 RX ADMIN — Medication SCH MG: at 17:22

## 2020-02-19 RX ADMIN — RIVAROXABAN SCH MG: 10 TABLET, FILM COATED ORAL at 17:24

## 2020-02-19 RX ADMIN — SODIUM CHLORIDE PRN MLS/HR: 9 INJECTION, SOLUTION INTRAVENOUS at 05:59

## 2020-02-19 RX ADMIN — PANTOPRAZOLE SODIUM SCH MG: 40 TABLET, DELAYED RELEASE ORAL at 08:16

## 2020-02-19 RX ADMIN — LEVETIRACETAM SCH MG: 100 SOLUTION ORAL at 22:04

## 2020-02-19 RX ADMIN — Medication SCH OZ: at 08:29

## 2020-02-19 RX ADMIN — CALCIUM CARBONATE-CHOLECALCIFEROL TAB 250 MG-125 UNIT SCH UDTAB: 250-125 TAB at 17:22

## 2020-02-19 RX ADMIN — LACTOBACILLUS TAB SCH EACH: TAB at 17:22

## 2020-02-19 RX ADMIN — SODIUM CHLORIDE PRN MLS/HR: 9 INJECTION, SOLUTION INTRAVENOUS at 22:05

## 2020-02-19 RX ADMIN — CALCIUM CARBONATE-CHOLECALCIFEROL TAB 250 MG-125 UNIT SCH UDTAB: 250-125 TAB at 08:16

## 2020-02-19 RX ADMIN — CLONIDINE HYDROCHLORIDE PRN MG: 0.1 TABLET ORAL at 22:05

## 2020-02-19 RX ADMIN — Medication SCH DROP: at 17:00

## 2020-02-19 RX ADMIN — THERA TABS SCH UDTAB: TAB at 08:19

## 2020-02-19 RX ADMIN — FERROUS SULFATE TAB 325 MG (65 MG ELEMENTAL FE) SCH MG: 325 (65 FE) TAB at 08:15

## 2020-02-19 RX ADMIN — LACTOBACILLUS TAB SCH EACH: TAB at 08:15

## 2020-02-19 RX ADMIN — OXYCODONE HYDROCHLORIDE AND ACETAMINOPHEN SCH MG: 500 TABLET ORAL at 08:16

## 2020-02-19 RX ADMIN — Medication SCH DROP: at 08:29

## 2020-02-19 RX ADMIN — FOLIC ACID SCH MG: 1 TABLET ORAL at 08:15

## 2020-02-19 NOTE — NUR
RN Notes



Patient stable overnight, afebrile. Current diet tolerated well, no signs of aspiration 
noted. No signs of distress and discomfort noted. No signs of seizure noted. Slight movement 
on upper extremities noted when cleaning the patient. Kept clean and dry, repositioned per 
protocol. All needs attended. Will continue to monitor.

## 2020-02-19 NOTE — NUR
PATINE IN BED ALERT AND ORIENTED X 1, NON-VERBAL. BREATHING REGULAR AND UNLABORED ON ROOM 
AIR. LEFT HAND G22 IV LINE PATENT AND INFUSING WELL FLUIDS AS ORDERED. SKIN TREATMENT 
PROVIDED. NO S/S OF PAIN/DISCOMFORT OBSERVED. BED LOW AND LOCKED POSITION , SIDE RAILS UP 
X2, CARE GIVER AT BEDSIDE . CALL LIGHT IN REACH. WILL ENDORSE TO NEXT SHIFT FOR STACEY.

## 2020-02-19 NOTE — NUR
RN OPENING NOTES



RECEIVED REPORT FROM [facility] COKER. FAMILY CAREGIVER AT BEDSIDE. FOUND Pt AWAKE,  
SITTING UP IN BED, WITH UNLABORED RESPIRATIONS, AND EQUAL CHEST RISE AND FALL. Pt IS A/OX1, 
NONVERBAL, OPENS EYES & RESPONDS TO STIMULI. Pt IS ON A CARDIAC PUREE DIET. IV ACCESS ON 
AND #22G, IVF NS RUNNING @75ML/HR, INFUSING WELL. SAFETY MEASURES IN PLACE, BED LOW, 
LOCKED, HOB ELEVATED, SIDE RAILS UP, CALL LIGHT AND BEDSIDE TABLE WITHIN REACH. 24HR FAMILY 
CAREGIVER WILL BE AT BEDSIDE DURING Pt's STAY. WILL CONTINUE TO MONITOR Pt's CONDITION AND 
SAFETY THROUGHOUT THE NIGHT.

## 2020-02-20 VITALS — DIASTOLIC BLOOD PRESSURE: 85 MMHG | SYSTOLIC BLOOD PRESSURE: 155 MMHG

## 2020-02-20 VITALS — DIASTOLIC BLOOD PRESSURE: 84 MMHG | SYSTOLIC BLOOD PRESSURE: 152 MMHG

## 2020-02-20 VITALS — SYSTOLIC BLOOD PRESSURE: 130 MMHG | DIASTOLIC BLOOD PRESSURE: 73 MMHG

## 2020-02-20 VITALS — DIASTOLIC BLOOD PRESSURE: 91 MMHG | SYSTOLIC BLOOD PRESSURE: 164 MMHG

## 2020-02-20 VITALS — SYSTOLIC BLOOD PRESSURE: 148 MMHG | DIASTOLIC BLOOD PRESSURE: 80 MMHG

## 2020-02-20 RX ADMIN — Medication SCH MG: at 09:03

## 2020-02-20 RX ADMIN — Medication SCH OZ: at 09:04

## 2020-02-20 RX ADMIN — THERA TABS SCH UDTAB: TAB at 09:03

## 2020-02-20 RX ADMIN — Medication SCH DROP: at 17:00

## 2020-02-20 RX ADMIN — ACETAMINOPHEN SCH MG: 325 TABLET ORAL at 09:03

## 2020-02-20 RX ADMIN — OXYCODONE HYDROCHLORIDE AND ACETAMINOPHEN SCH MG: 500 TABLET ORAL at 09:04

## 2020-02-20 RX ADMIN — FOLIC ACID SCH MG: 1 TABLET ORAL at 09:04

## 2020-02-20 RX ADMIN — CALCIUM CARBONATE-CHOLECALCIFEROL TAB 250 MG-125 UNIT SCH UDTAB: 250-125 TAB at 17:47

## 2020-02-20 RX ADMIN — SODIUM CHLORIDE PRN MLS/HR: 9 INJECTION, SOLUTION INTRAVENOUS at 21:39

## 2020-02-20 RX ADMIN — Medication SCH DROP: at 09:10

## 2020-02-20 RX ADMIN — LACTOBACILLUS TAB SCH EACH: TAB at 09:12

## 2020-02-20 RX ADMIN — RIVAROXABAN SCH MG: 10 TABLET, FILM COATED ORAL at 17:48

## 2020-02-20 RX ADMIN — Medication SCH MG: at 17:47

## 2020-02-20 RX ADMIN — CLONIDINE HYDROCHLORIDE PRN MG: 0.1 TABLET ORAL at 16:11

## 2020-02-20 RX ADMIN — FERROUS SULFATE TAB 325 MG (65 MG ELEMENTAL FE) SCH MG: 325 (65 FE) TAB at 09:04

## 2020-02-20 RX ADMIN — LACTOBACILLUS TAB SCH EACH: TAB at 17:47

## 2020-02-20 RX ADMIN — CALCIUM CARBONATE-CHOLECALCIFEROL TAB 250 MG-125 UNIT SCH UDTAB: 250-125 TAB at 09:04

## 2020-02-20 RX ADMIN — PANTOPRAZOLE SODIUM SCH MG: 40 TABLET, DELAYED RELEASE ORAL at 09:04

## 2020-02-20 RX ADMIN — LEVETIRACETAM SCH MG: 100 SOLUTION ORAL at 21:32

## 2020-02-20 NOTE — NUR
MS RN 

PATIENT IN BED AWAKE, A/O X 1 CAREGIVER AT BEDSIDE, STABLE AND NOT IN DISTRESS. SAFETY 
MEASURES IN PLACE. WILL CONTINUE TO MONITOR.

## 2020-02-21 VITALS — SYSTOLIC BLOOD PRESSURE: 143 MMHG | DIASTOLIC BLOOD PRESSURE: 96 MMHG

## 2020-02-21 VITALS — SYSTOLIC BLOOD PRESSURE: 155 MMHG | DIASTOLIC BLOOD PRESSURE: 92 MMHG

## 2020-02-21 VITALS — DIASTOLIC BLOOD PRESSURE: 79 MMHG | SYSTOLIC BLOOD PRESSURE: 141 MMHG

## 2020-02-21 RX ADMIN — Medication SCH DROP: at 08:49

## 2020-02-21 RX ADMIN — LACTOBACILLUS TAB SCH EACH: TAB at 16:59

## 2020-02-21 RX ADMIN — RIVAROXABAN SCH MG: 10 TABLET, FILM COATED ORAL at 17:00

## 2020-02-21 RX ADMIN — ACETAMINOPHEN SCH MG: 325 TABLET ORAL at 08:50

## 2020-02-21 RX ADMIN — CALCIUM CARBONATE-CHOLECALCIFEROL TAB 250 MG-125 UNIT SCH UDTAB: 250-125 TAB at 16:59

## 2020-02-21 RX ADMIN — THERA TABS SCH UDTAB: TAB at 08:48

## 2020-02-21 RX ADMIN — Medication SCH DROP: at 16:58

## 2020-02-21 RX ADMIN — CALCIUM CARBONATE-CHOLECALCIFEROL TAB 250 MG-125 UNIT SCH UDTAB: 250-125 TAB at 08:48

## 2020-02-21 RX ADMIN — Medication SCH OZ: at 08:51

## 2020-02-21 RX ADMIN — Medication SCH MG: at 16:58

## 2020-02-21 RX ADMIN — OXYCODONE HYDROCHLORIDE AND ACETAMINOPHEN SCH MG: 500 TABLET ORAL at 08:48

## 2020-02-21 RX ADMIN — Medication SCH MG: at 08:48

## 2020-02-21 RX ADMIN — PANTOPRAZOLE SODIUM SCH MG: 40 TABLET, DELAYED RELEASE ORAL at 08:48

## 2020-02-21 RX ADMIN — LACTOBACILLUS TAB SCH EACH: TAB at 08:48

## 2020-02-21 RX ADMIN — FERROUS SULFATE TAB 325 MG (65 MG ELEMENTAL FE) SCH MG: 325 (65 FE) TAB at 08:48

## 2020-02-21 RX ADMIN — FOLIC ACID SCH MG: 1 TABLET ORAL at 08:48

## 2020-02-21 RX ADMIN — LEVETIRACETAM SCH MG: 100 SOLUTION ORAL at 21:49

## 2020-02-21 NOTE — NUR
m/s lvn: initial assessment



received pt in bed awake, non-verbal. private caregiver at bedside and will leave soon and 
her daughter will take over as stated. no s/s of distress noted. appears comfortable. 
reality orientation provided prn. will continue to monitor.

## 2020-02-21 NOTE — NUR
SLEPT WELL, STABLE AND NOT IN DISTRESS, CAREGIVER AT BEDSIDE. AM CARE RENDERED. NEEDS 
ATTENDED AND ANTICIPATED, KEPT CLEAN, DRY AND COMFORTABLE.  OFFLOAD HEELS AND ELBOWS. SAFETY 
MEASURES AT ALL TIMES. WILL ENDORSE NEXT SHIFT POC.

## 2020-02-21 NOTE — NUR
m/s lvn: neuro f/u



seen and examined by dr. mera at this time. all questions and concerns answered by 
neurologist.

## 2020-02-21 NOTE — NUR
m/s lvn: notes



daughter remains here, about to give artificial tears, but daughter refused to give, stated, 
"it has alcohol in it, i say not to give it." informed daughter this is one of her home meds 
and md reviewed them and continued her home meds, but daughter still says no and will ask 
her caregiver about it.

## 2020-02-21 NOTE — NUR
MS RN 



PATIENT IN BED AWAKE AND A/O X 1, DAUGHTER AT BEDSIDE, PT NOT IN DISTRESS, SAFETY MEASURES 
IN PLACE. WILL CONTINUE TO MONITOR.

## 2020-02-21 NOTE — NUR
m/s lvn: notes



incontinent care rendered by staff. kept clean and dry. daughter refusing to have pt turned 
from side to side, stated, "she has a spine problem." educated daughter re: benefits of 
turning and repositioning and educated on prevention of skin breakdown, but still insisted 
on lying supine only.

## 2020-02-21 NOTE — NUR
m/s lvn: notes



incontinent of bladder rendered by staff. daughter still refuses to have pt turned and 
reposition on the side.

## 2020-02-21 NOTE — NUR
m/s lvn: notes



daughter remains at bedside and assisting pt with her lunch. hob elevated. instructed to 
call for assistance. will continue to monitor.

## 2020-02-21 NOTE — NUR
m/s lvn: md visit



seen and examined by dr. callejas and daughter addressed her concern on blood pressure with 
md. daughter verbalized understanding.

## 2020-02-22 VITALS — SYSTOLIC BLOOD PRESSURE: 142 MMHG | DIASTOLIC BLOOD PRESSURE: 70 MMHG

## 2020-02-22 VITALS — SYSTOLIC BLOOD PRESSURE: 126 MMHG | DIASTOLIC BLOOD PRESSURE: 73 MMHG

## 2020-02-22 VITALS — DIASTOLIC BLOOD PRESSURE: 74 MMHG | SYSTOLIC BLOOD PRESSURE: 127 MMHG

## 2020-02-22 RX ADMIN — LEVETIRACETAM SCH MG: 100 SOLUTION ORAL at 21:27

## 2020-02-22 RX ADMIN — OXYCODONE HYDROCHLORIDE AND ACETAMINOPHEN SCH MG: 500 TABLET ORAL at 08:47

## 2020-02-22 RX ADMIN — LACTOBACILLUS TAB SCH EACH: TAB at 17:13

## 2020-02-22 RX ADMIN — THERA TABS SCH UDTAB: TAB at 08:47

## 2020-02-22 RX ADMIN — CALCIUM CARBONATE-CHOLECALCIFEROL TAB 250 MG-125 UNIT SCH UDTAB: 250-125 TAB at 08:46

## 2020-02-22 RX ADMIN — LACTOBACILLUS TAB SCH EACH: TAB at 08:46

## 2020-02-22 RX ADMIN — FOLIC ACID SCH MG: 1 TABLET ORAL at 08:47

## 2020-02-22 RX ADMIN — Medication SCH DROP: at 09:09

## 2020-02-22 RX ADMIN — Medication SCH DROP: at 17:13

## 2020-02-22 RX ADMIN — CALCIUM CARBONATE-CHOLECALCIFEROL TAB 250 MG-125 UNIT SCH UDTAB: 250-125 TAB at 17:13

## 2020-02-22 RX ADMIN — ACETAMINOPHEN SCH MG: 325 TABLET ORAL at 08:47

## 2020-02-22 RX ADMIN — Medication SCH MG: at 08:47

## 2020-02-22 RX ADMIN — Medication SCH OZ: at 08:49

## 2020-02-22 RX ADMIN — FERROUS SULFATE TAB 325 MG (65 MG ELEMENTAL FE) SCH MG: 325 (65 FE) TAB at 08:46

## 2020-02-22 RX ADMIN — Medication SCH MG: at 17:13

## 2020-02-22 RX ADMIN — RIVAROXABAN SCH MG: 10 TABLET, FILM COATED ORAL at 17:15

## 2020-02-22 RX ADMIN — PANTOPRAZOLE SODIUM SCH MG: 40 TABLET, DELAYED RELEASE ORAL at 08:47

## 2020-02-22 NOTE — NUR
NO SIGNIFICANT CHANGES, MONITORED ACCORDINGLY. O2 SAT WNL R.A.  CAREGIVER AT BEDSIDE.  KEPT 
CLEAN, DRY AND COMFORTABLE. NO S/S OF DSITRESS. AM CARE RENDERED. NEEDS ATTENDED AND 
ANTICIPATED,OFFLOAD HEELS AND ELBOWS. SAFETY MEASURES AT ALL TIMES. WILL ENDORSE NEXT SHIFT 
POC. 

-------------------------------------------------------------------------------

Addendum: 02/22/20 at 0707 by NAV NIX RN

-------------------------------------------------------------------------------

NO SEIZURE ACTIVITY THROUGHOUT THE SHIFT.

## 2020-02-22 NOTE — NUR
MS RN NOTES



PATIENT IN BED RESTING COMFORTABLY IN MODERATE HIGH BACK REST. A/O X1.  SON AT BEDSIDE. NO 
SIGNS OF DISTRESS NOTED THROUGHOUT THE SHIFT. IV ACCESS ON LEFT HAND #22, SL. PATENT AND 
INTACT. SAFETY MEASURES IN PLACE, BED IN LOWEST LOCKED POSITION WITH SIDE RAILS UP X2. CALL 
LIGHT WITHIN REACH. WILL ENDORSE TO NIGHT SHIFT NURSE FOR STACEY.

## 2020-02-22 NOTE — NUR
MS RN 



RECEIVED PATIENT IN BED RESTING COMFORTABLY IN MODERATE HIGH BACK REST. A/O X1. CAREGIVER AT 
BEDSIDE. NO SIGNS OF DISTRESS NOTED AT THIS TIME. IV ACCESS ON LEFT HAND #22, SL. PATENT AND 
INTACT. SAFETY MEASURES IN PLACE, BED IN LOWEST LOCKED POSITION WITH SIDE RAILS UP X2. CALL 
LIGHT WITHIN REACH. WILL CONTINUE TO MONITOR.

## 2020-02-22 NOTE — NUR
MS RN 



PATIENT IN BED AWAKE SON CLAIRE AT BEDSIDE (CAREGIVER), PT NOT IN DISTRESS, STABLE, SAFETY 
MEASURES IN PLACE. WILL CONTINUE TO MONITOR.

## 2020-02-23 VITALS — SYSTOLIC BLOOD PRESSURE: 130 MMHG | DIASTOLIC BLOOD PRESSURE: 87 MMHG

## 2020-02-23 VITALS — SYSTOLIC BLOOD PRESSURE: 141 MMHG | DIASTOLIC BLOOD PRESSURE: 84 MMHG

## 2020-02-23 VITALS — DIASTOLIC BLOOD PRESSURE: 84 MMHG | SYSTOLIC BLOOD PRESSURE: 141 MMHG

## 2020-02-23 RX ADMIN — LISINOPRIL SCH MG: 5 TABLET ORAL at 09:09

## 2020-02-23 RX ADMIN — THERA TABS SCH UDTAB: TAB at 09:09

## 2020-02-23 RX ADMIN — FOLIC ACID SCH MG: 1 TABLET ORAL at 09:08

## 2020-02-23 RX ADMIN — Medication SCH MG: at 09:09

## 2020-02-23 RX ADMIN — Medication SCH OZ: at 09:14

## 2020-02-23 RX ADMIN — CALCIUM CARBONATE-CHOLECALCIFEROL TAB 250 MG-125 UNIT SCH UDTAB: 250-125 TAB at 09:08

## 2020-02-23 RX ADMIN — CALCIUM CARBONATE-CHOLECALCIFEROL TAB 250 MG-125 UNIT SCH UDTAB: 250-125 TAB at 16:28

## 2020-02-23 RX ADMIN — LACTOBACILLUS TAB SCH EACH: TAB at 16:28

## 2020-02-23 RX ADMIN — LEVETIRACETAM SCH MG: 100 SOLUTION ORAL at 22:01

## 2020-02-23 RX ADMIN — LACTOBACILLUS TAB SCH EACH: TAB at 09:14

## 2020-02-23 RX ADMIN — Medication SCH MG: at 16:28

## 2020-02-23 RX ADMIN — Medication SCH DROP: at 16:28

## 2020-02-23 RX ADMIN — RIVAROXABAN SCH MG: 10 TABLET, FILM COATED ORAL at 16:28

## 2020-02-23 RX ADMIN — OXYCODONE HYDROCHLORIDE AND ACETAMINOPHEN SCH MG: 500 TABLET ORAL at 09:09

## 2020-02-23 RX ADMIN — ACETAMINOPHEN SCH MG: 325 TABLET ORAL at 09:08

## 2020-02-23 RX ADMIN — FERROUS SULFATE TAB 325 MG (65 MG ELEMENTAL FE) SCH MG: 325 (65 FE) TAB at 09:09

## 2020-02-23 RX ADMIN — PANTOPRAZOLE SODIUM SCH MG: 40 TABLET, DELAYED RELEASE ORAL at 09:09

## 2020-02-23 RX ADMIN — Medication PRN MG: at 02:41

## 2020-02-23 RX ADMIN — Medication SCH DROP: at 09:09

## 2020-02-23 NOTE — NUR
MS RN OPENING NOTES



RECEIVED PATIENT FROM MORNING SHIFT, ALERT AND ORIENTED X 1, NON-VERBAL. CAREGIVER AND 
FAMILY ON BEDSIDE. BREATHING REGULAR AND UNLABORED ON ROOM AIR. LEFT HAND G22 IV LINE INTACT 
AND PATENT, FLUSHING WELL WITH NO BLEEDING OR S/S OF INFILTRATION NOTED. NO S/S OF 
PAIN/DISCOMFORT OBSERVED AS OF THE TIME. BED LOW AND LOCKED ON SEMI FOWLERS POSITION. CALL 
LIGHT IN REACH. WILL CONTINUE TO MONITOR.

## 2020-02-23 NOTE — NUR
MS RN NOTES



NOTED WITH BILATERAL GROIN RASH, PHOTO TAKEN ATTACHED TO CHART. WASH WITH WARM WATER, PAT 
DRY. APPLIED Z-GUARD. WILL REPOSITION EVERY 2HRS AND AS NEEDED.

## 2020-02-23 NOTE — NUR
MS RN CLOSING NOTES



PATIENT IS LAYING IN BED WITH CAREGIVER AT BEDSIDE. CALL LIGHT IS WITHIN REACH, APPEARS TO 
BE COMFORTABLE. PATIENT ON ROOM AIR WITH NO SOB NOTED. BED IN LOWEST POSITION, WITH SIDE 
RAILS X2, AT 45 DEGREES PER ASPIRATION PRECAUTIONS. GAVE REPORT TO PM NURSE.

## 2020-02-23 NOTE — NUR
MS RN



CAREGIVER AT BEDSIDE REQUESTED TO HAVE PT BREATHING TREATMENT CHECKED O2 SAT 94% ROOM AIR. 
PER CAREGIVER SHE FEELS THE PT NEEDS IT. SUCTION PT WITH LITTLE PHLEGM (WHITE THIN). 
EXPLAINED O2 SAT WNL. WILL CONTINUE TO MONITOR.

## 2020-02-23 NOTE — NUR
MS RN



PT STABLE AND NOT IN DISTRESS, MONITORED PT ACCORDINGLY. AM CARE RENDERED. NEEDS ATTENDED 
AND ANTICIPATED, KEPT CLEAN, DRY AND COMFORTABLE. O2  SAT ROOM AIR 96%. NURSING CARE 
RENDERED. OFFLOAD HEELS AND ELBOWS AT ALL TIMES. CAREGIVER AT BEDSIDE. SAFETY MEASURES AT 
ALL TIMES. WILL ENDORSE TO NEXT SHIFT

-------------------------------------------------------------------------------

Addendum: 02/23/20 at 0633 by NAV NIX RN

-------------------------------------------------------------------------------

NO SEIZURE ACTIVITY NOTED

## 2020-02-23 NOTE — NUR
MS RN NOTES



PT IS LAYING IN BED, HOB AT 45 DEGREES/SEMI FOWLERS. CRUSHED MEDICATIONS BEFORE 
ADMINISTERING DUE TO ASPIRATION PRECAUTIONS. CAREGIVER AT BEDSIDE, CALL LIGHT WITHIN REACH. 
PT APPEARS TO HAVE NO SOB. WILL CONTINUE TO MONITOR.

## 2020-02-24 VITALS — SYSTOLIC BLOOD PRESSURE: 137 MMHG | DIASTOLIC BLOOD PRESSURE: 77 MMHG

## 2020-02-24 VITALS — DIASTOLIC BLOOD PRESSURE: 77 MMHG | SYSTOLIC BLOOD PRESSURE: 137 MMHG

## 2020-02-24 RX ADMIN — PANTOPRAZOLE SODIUM SCH MG: 40 TABLET, DELAYED RELEASE ORAL at 09:42

## 2020-02-24 RX ADMIN — Medication SCH OZ: at 09:44

## 2020-02-24 RX ADMIN — LACTOBACILLUS TAB SCH EACH: TAB at 09:42

## 2020-02-24 RX ADMIN — FERROUS SULFATE TAB 325 MG (65 MG ELEMENTAL FE) SCH MG: 325 (65 FE) TAB at 09:43

## 2020-02-24 RX ADMIN — OXYCODONE HYDROCHLORIDE AND ACETAMINOPHEN SCH MG: 500 TABLET ORAL at 09:44

## 2020-02-24 RX ADMIN — THERA TABS SCH UDTAB: TAB at 09:42

## 2020-02-24 RX ADMIN — Medication SCH DROP: at 09:42

## 2020-02-24 RX ADMIN — LISINOPRIL SCH MG: 5 TABLET ORAL at 09:43

## 2020-02-24 RX ADMIN — Medication SCH MG: at 09:43

## 2020-02-24 RX ADMIN — FOLIC ACID SCH MG: 1 TABLET ORAL at 09:43

## 2020-02-24 RX ADMIN — ACETAMINOPHEN SCH MG: 325 TABLET ORAL at 09:44

## 2020-02-24 RX ADMIN — CALCIUM CARBONATE-CHOLECALCIFEROL TAB 250 MG-125 UNIT SCH UDTAB: 250-125 TAB at 09:42

## 2020-02-24 NOTE — NUR
MS RN CLOSING NOTES



PATIENT IN BED ALERT AND ORIENTED X 1, NON-VERBAL. CAREGIVER ON BEDSIDE. BREATHING REGULAR 
AND UNLABORED ON ROOM AIR. LEFT HAND G22 IV LINE PATENT AND FLUSHING WELL. NO S/S OF 
PAIN/DISCOMFORT OBSERVED THE WHOLE SHIFT. BED LOW AND LOCKED ON SEMI FOWLERS POSITION. CALL 
LIGHT IN REACH. WILL ENDORSE TO MORNING SHIFT FOR STACEY.

## 2020-02-24 NOTE — NUR
rn notes

RECEIVED PT  IN THE BED NONVERBALE, TOTAL CARE.  V/S STABLE. HOB AT 45 DEGREES/SEMI FOWLERS. 
CRUSHED MEDICATIONS BEFORE ADMINISTERING DUE TO ASPIRATION PRECAUTIONS. CAREGIVER AT 
BEDSIDE, ASSIST TURN AND REPOSTION Q 2 HR. PATIENT CALL LIGHT WITHIN REACH. PT APPEARS TO 
HAVE NO SOB. WILL CONTINUE TO MONITOR.

## 2020-02-24 NOTE — NUR
RN DISCHARGE NOTES

PATIENT DISCHARGE AT THIS TIME  GOING BACK TO THE SO. PATIENT NON-VERBAL, NO ACUTE 
RESPIRATORY DISTRESS, V/S WNL, TOTAL CARE. MED RECONCILIATION AND DISCHARGE ORDER REVIEWED  
AND EXPLAINED TO THE  CARE GIVER. REPORT GIVEN SNF DANIELLE GRAHAM. RN VERBALIZED UNDERSTANDING. 
BELONGING WITH THE PATIENT. PATIENT WILL FOLLOW SNF INTERNIST. PAPERWORK GIVEN PARAMEDIC 
PERSONNEL. PATIENT  BY AMBULANCE.

## 2020-07-17 ENCOUNTER — HOSPITAL ENCOUNTER (INPATIENT)
Dept: HOSPITAL 54 - ER | Age: 80
LOS: 10 days | Discharge: HOSPICE | DRG: 100 | End: 2020-07-27
Attending: FAMILY MEDICINE | Admitting: INTERNAL MEDICINE
Payer: MEDICARE

## 2020-07-17 VITALS — HEIGHT: 66 IN | WEIGHT: 123 LBS | BODY MASS INDEX: 19.77 KG/M2

## 2020-07-17 VITALS — DIASTOLIC BLOOD PRESSURE: 76 MMHG | SYSTOLIC BLOOD PRESSURE: 159 MMHG

## 2020-07-17 DIAGNOSIS — F09: ICD-10-CM

## 2020-07-17 DIAGNOSIS — E87.2: ICD-10-CM

## 2020-07-17 DIAGNOSIS — Z96.649: ICD-10-CM

## 2020-07-17 DIAGNOSIS — Z88.8: ICD-10-CM

## 2020-07-17 DIAGNOSIS — Z79.51: ICD-10-CM

## 2020-07-17 DIAGNOSIS — Z88.2: ICD-10-CM

## 2020-07-17 DIAGNOSIS — G93.41: ICD-10-CM

## 2020-07-17 DIAGNOSIS — N39.0: ICD-10-CM

## 2020-07-17 DIAGNOSIS — E87.6: ICD-10-CM

## 2020-07-17 DIAGNOSIS — F02.80: ICD-10-CM

## 2020-07-17 DIAGNOSIS — B96.20: ICD-10-CM

## 2020-07-17 DIAGNOSIS — I50.32: ICD-10-CM

## 2020-07-17 DIAGNOSIS — K21.9: ICD-10-CM

## 2020-07-17 DIAGNOSIS — Z79.899: ICD-10-CM

## 2020-07-17 DIAGNOSIS — G40.909: Primary | ICD-10-CM

## 2020-07-17 DIAGNOSIS — Z79.01: ICD-10-CM

## 2020-07-17 DIAGNOSIS — J98.11: ICD-10-CM

## 2020-07-17 DIAGNOSIS — I11.0: ICD-10-CM

## 2020-07-17 DIAGNOSIS — D50.9: ICD-10-CM

## 2020-07-17 DIAGNOSIS — G30.9: ICD-10-CM

## 2020-07-17 DIAGNOSIS — Z87.440: ICD-10-CM

## 2020-07-17 DIAGNOSIS — Z88.1: ICD-10-CM

## 2020-07-17 LAB
ALBUMIN SERPL BCP-MCNC: 3.5 G/DL (ref 3.4–5)
ALP SERPL-CCNC: 97 U/L (ref 46–116)
ALT SERPL W P-5'-P-CCNC: 20 U/L (ref 12–78)
APPEARANCE UR: (no result)
AST SERPL W P-5'-P-CCNC: 20 U/L (ref 15–37)
BASOPHILS # BLD AUTO: 0 /CMM (ref 0–0.2)
BASOPHILS NFR BLD AUTO: 0.9 % (ref 0–2)
BILIRUB DIRECT SERPL-MCNC: 0.1 MG/DL (ref 0–0.2)
BILIRUB SERPL-MCNC: 0.3 MG/DL (ref 0.2–1)
BUN SERPL-MCNC: 20 MG/DL (ref 7–18)
CALCIUM SERPL-MCNC: 9 MG/DL (ref 8.5–10.1)
CHLORIDE SERPL-SCNC: 105 MMOL/L (ref 98–107)
CO2 SERPL-SCNC: 28 MMOL/L (ref 21–32)
CREAT SERPL-MCNC: 0.5 MG/DL (ref 0.6–1.3)
EOSINOPHIL NFR BLD AUTO: 1.1 % (ref 0–6)
GLUCOSE SERPL-MCNC: 102 MG/DL (ref 74–106)
HCT VFR BLD AUTO: 44 % (ref 33–45)
HGB BLD-MCNC: 14.4 G/DL (ref 11.5–14.8)
LYMPHOCYTES NFR BLD AUTO: 1.8 /CMM (ref 0.8–4.8)
LYMPHOCYTES NFR BLD AUTO: 33 % (ref 20–44)
MCHC RBC AUTO-ENTMCNC: 33 G/DL (ref 31–36)
MCV RBC AUTO: 99 FL (ref 82–100)
MONOCYTES NFR BLD AUTO: 0.2 /CMM (ref 0.1–1.3)
MONOCYTES NFR BLD AUTO: 3.9 % (ref 2–12)
NEUTROPHILS # BLD AUTO: 3.3 /CMM (ref 1.8–8.9)
NEUTROPHILS NFR BLD AUTO: 61.1 % (ref 43–81)
PH UR STRIP: 5.5 [PH] (ref 5–8)
PLATELET # BLD AUTO: 154 /CMM (ref 150–450)
POTASSIUM SERPL-SCNC: 4.5 MMOL/L (ref 3.5–5.1)
PROT SERPL-MCNC: 7.3 G/DL (ref 6.4–8.2)
PROT UR QL STRIP: 100 MG/DL
RBC # BLD AUTO: 4.45 MIL/UL (ref 4–5.2)
SODIUM SERPL-SCNC: 140 MMOL/L (ref 136–145)
UROBILINOGEN UR STRIP-MCNC: 0.2 EU/DL
WBC #/AREA URNS HPF: (no result) /HPF (ref 0–3)
WBC NRBC COR # BLD AUTO: 5.4 K/UL (ref 4.3–11)

## 2020-07-17 PROCEDURE — G0378 HOSPITAL OBSERVATION PER HR: HCPCS

## 2020-07-17 PROCEDURE — C9113 INJ PANTOPRAZOLE SODIUM, VIA: HCPCS

## 2020-07-17 RX ADMIN — DEXTROSE AND SODIUM CHLORIDE PRN MLS/HR: 5; 450 INJECTION, SOLUTION INTRAVENOUS at 21:29

## 2020-07-17 RX ADMIN — ENOXAPARIN SODIUM SCH MG: 40 INJECTION SUBCUTANEOUS at 18:56

## 2020-07-17 NOTE — NUR
MUKESH FROM [facility] FOR WITNESSED SEIZURE. ATIVAN 
1MG GIVEN,  PTA. TO ER BED 8, HOOKED TO MONITOR, CHANGED TO HOSP GOWN, 
PATIENT RESPONSIVE TO PAINFUL STIMULI. PROVIDED W WARM BLANKET, SEIZURE 
PRECAUTIONS APPLIED. AWAITING MD BEAL.

## 2020-07-17 NOTE — NUR
patient in bed, hooked to monitor, vss, responds to painful stimuli, will 
continue to monitor accordingly.

## 2020-07-17 NOTE — NUR
RECEIVED PATIENT IN BED. NO ACUTE DISTRESS NOTED. PATIENT A&OX0, NONVERBAL BUT RESPONSIVE TO 
TOUCH. PATIENT ON 4L OXYGEN VIA NASAL CANNULA, SATURATING WELL AT %, BREATHING EVEN 
AND UNLABORED. PATIENT VITAL SIGNS STABLE- BLOOD PRESSURE /90, TEMPERATURE OF 97.5, 
RESPIRATIONS OF 17, HERAT RATE AT 70. PATIENT SAFETY MAINTAINED. CALL LIGHT WITHIN REACH. 
WILL ENDORSE PLAN OF CARE TO ONCOMING NURSE FOR CONTINUITY OF CARE.

## 2020-07-18 VITALS — DIASTOLIC BLOOD PRESSURE: 51 MMHG | SYSTOLIC BLOOD PRESSURE: 156 MMHG

## 2020-07-18 VITALS — DIASTOLIC BLOOD PRESSURE: 72 MMHG | SYSTOLIC BLOOD PRESSURE: 138 MMHG

## 2020-07-18 VITALS — SYSTOLIC BLOOD PRESSURE: 101 MMHG | DIASTOLIC BLOOD PRESSURE: 53 MMHG

## 2020-07-18 VITALS — SYSTOLIC BLOOD PRESSURE: 146 MMHG | DIASTOLIC BLOOD PRESSURE: 81 MMHG

## 2020-07-18 VITALS — SYSTOLIC BLOOD PRESSURE: 143 MMHG | DIASTOLIC BLOOD PRESSURE: 69 MMHG

## 2020-07-18 VITALS — DIASTOLIC BLOOD PRESSURE: 81 MMHG | SYSTOLIC BLOOD PRESSURE: 146 MMHG

## 2020-07-18 VITALS — SYSTOLIC BLOOD PRESSURE: 154 MMHG | DIASTOLIC BLOOD PRESSURE: 91 MMHG

## 2020-07-18 LAB
BASOPHILS # BLD AUTO: 0.1 /CMM (ref 0–0.2)
BASOPHILS NFR BLD AUTO: 0.9 % (ref 0–2)
BUN SERPL-MCNC: 11 MG/DL (ref 7–18)
CALCIUM SERPL-MCNC: 9 MG/DL (ref 8.5–10.1)
CHLORIDE SERPL-SCNC: 100 MMOL/L (ref 98–107)
CHOLEST SERPL-MCNC: 172 MG/DL (ref ?–200)
CO2 SERPL-SCNC: 29 MMOL/L (ref 21–32)
CREAT SERPL-MCNC: 0.2 MG/DL (ref 0.6–1.3)
EOSINOPHIL NFR BLD AUTO: 1.6 % (ref 0–6)
GLUCOSE SERPL-MCNC: 96 MG/DL (ref 74–106)
HCT VFR BLD AUTO: 45 % (ref 33–45)
HDLC SERPL-MCNC: 65 MG/DL (ref 40–60)
HGB BLD-MCNC: 14.7 G/DL (ref 11.5–14.8)
LDLC SERPL DIRECT ASSAY-MCNC: 98 MG/DL (ref 0–99)
LYMPHOCYTES NFR BLD AUTO: 1.6 /CMM (ref 0.8–4.8)
LYMPHOCYTES NFR BLD AUTO: 26.6 % (ref 20–44)
MAGNESIUM SERPL-MCNC: 2.3 MG/DL (ref 1.8–2.4)
MCHC RBC AUTO-ENTMCNC: 33 G/DL (ref 31–36)
MCV RBC AUTO: 99 FL (ref 82–100)
MONOCYTES NFR BLD AUTO: 0.3 /CMM (ref 0.1–1.3)
MONOCYTES NFR BLD AUTO: 5 % (ref 2–12)
NEUTROPHILS # BLD AUTO: 4.1 /CMM (ref 1.8–8.9)
NEUTROPHILS NFR BLD AUTO: 65.9 % (ref 43–81)
PHOSPHATE SERPL-MCNC: 3.2 MG/DL (ref 2.5–4.9)
PLATELET # BLD AUTO: 142 /CMM (ref 150–450)
POTASSIUM SERPL-SCNC: 3.8 MMOL/L (ref 3.5–5.1)
RBC # BLD AUTO: 4.59 MIL/UL (ref 4–5.2)
SODIUM SERPL-SCNC: 136 MMOL/L (ref 136–145)
TRIGL SERPL-MCNC: 123 MG/DL (ref 30–150)
TSH SERPL DL<=0.005 MIU/L-ACNC: 1.04 UIU/ML (ref 0.36–3.74)
WBC NRBC COR # BLD AUTO: 6.2 K/UL (ref 4.3–11)

## 2020-07-18 RX ADMIN — ENOXAPARIN SODIUM SCH MG: 40 INJECTION SUBCUTANEOUS at 16:28

## 2020-07-18 RX ADMIN — PANTOPRAZOLE SODIUM SCH MG: 40 TABLET, DELAYED RELEASE ORAL at 07:49

## 2020-07-18 RX ADMIN — DEXTROSE AND SODIUM CHLORIDE PRN MLS/HR: 5; 450 INJECTION, SOLUTION INTRAVENOUS at 07:06

## 2020-07-18 RX ADMIN — DEXTROSE MONOHYDRATE SCH MLS/HR: 50 INJECTION, SOLUTION INTRAVENOUS at 15:08

## 2020-07-18 RX ADMIN — DEXTROSE AND SODIUM CHLORIDE PRN MLS/HR: 5; 450 INJECTION, SOLUTION INTRAVENOUS at 20:50

## 2020-07-18 RX ADMIN — SODIUM CHLORIDE SCH MG: 9 INJECTION, SOLUTION INTRAVENOUS at 13:00

## 2020-07-18 RX ADMIN — PANTOPRAZOLE SODIUM SCH MG: 40 TABLET, DELAYED RELEASE ORAL at 08:02

## 2020-07-18 NOTE — NUR
THADDEUS RN OPENING

RECEIVED PT IN BED.PT IS OBTUNDED. PT IS ON NASAL CANNULA 4L.NO. PT IS ON NPO. S/S OF 
RESPIRATORY DISTRESS . HOB 45 DEGREE.PT IS BREATHING UNLABORED. TELE MONITOR READING SINUS 
LISA HR 57. R HAND I#20 IS FLUSHED WELL AND INTACT AND  RUNNING TKO AT 5 ML/HR. SEIZURE 
PRECAUTIONS ARE IMPLEMENTED. BED IS IN THE LOWEST POSITION.BED LOCKED AND RAILS ARE UP X2. 
CALL LIGHT WITHIN REACH. WILL CONTINUE TO MONITOR

## 2020-07-18 NOTE — NUR
THADDEUS RN NOTES

PT IS STILL OBTUNDED. GETTING MORE ALERT BY THE HOUR. PT IS ON 4 L SAT 97%. SEIZURE 
PRECAUTIONS ARE IMPLEMENTED. PT IS ON NPO WITH THE FURTHER NOTICE. PT WILL BE TRANSFERRED TO 
[facility] TO MIRZA RN.BED IN THE LOWEST POSITION. CALL LIGHT WITHIN THE REACH. RAILS ARE UP 
X2. PT HAS R HAND IV RUNNING D 5 1/2 NS @ 75 ML/HR.PT HAS LOWER COMPRESSION FRACTURE AND 
NEEDS TWO ASSISTANCE TO CHANGE AND MOVE THE PATIENT. WILL ENDORSE TO NIGHT SHIFTS TO 
MONITOR.

## 2020-07-18 NOTE — NUR
TELE RN: TO ROOM 322-2 

Covid 19 resulted negative 7/17/20. Transferred patient to [facility] by bed with IBIS Solomon. 
Report given to IBIS Li. Personal belongings send with the patient upon transfer.

## 2020-07-18 NOTE — NUR
MS RN NOTES



SPOKE TO JENS (DAUGHTER). JENS STATED THAT HER MOM'S USUAL DIET IS PUREED AND REQUIRES 
A FEEDER.

## 2020-07-18 NOTE — NUR
THADDEUS ASHLIE NOTES

PT IS COVID NEGATIVE AND READY TO TRANSFER TO ROOM 322. PT IS OBTUNDED. PT IS ON 4 L N/C. 
SEIZURE PRECAUTIONARY ARE TAKEN. PT IS NPO DUE TO ASPIRATION RISK. R HAND D 1/2 NS @75 ML/HR 
RUNNING. BED IN LOWEST POSITION. CALL LIGHT WITHIN REACH. SIDE RAILS X2. WILL GIVE REPORT TO 
THE NURSE ON MED SURGE FLOOR.

## 2020-07-18 NOTE — NUR
RN notes



Patient in bed, resting comfortably with no distress noted. Admitted for seizure and UTI. 
Alert to self, non verbal. No physical manifestation of pain or discomfort. Bed bath given, 
skin assessment done. No episode of seizure as of this time. In no apparent distress, 
breathing even and unlabored. On O2 at 4lpm, via nasal cannula tolerating well. Kept clean 
and dry. Will continue to monitor

## 2020-07-19 VITALS — DIASTOLIC BLOOD PRESSURE: 75 MMHG | SYSTOLIC BLOOD PRESSURE: 158 MMHG

## 2020-07-19 VITALS — DIASTOLIC BLOOD PRESSURE: 86 MMHG | SYSTOLIC BLOOD PRESSURE: 165 MMHG

## 2020-07-19 VITALS — DIASTOLIC BLOOD PRESSURE: 75 MMHG | SYSTOLIC BLOOD PRESSURE: 155 MMHG

## 2020-07-19 LAB
BASOPHILS # BLD AUTO: 0 /CMM (ref 0–0.2)
BASOPHILS NFR BLD AUTO: 0.8 % (ref 0–2)
BUN SERPL-MCNC: 6 MG/DL (ref 7–18)
CALCIUM SERPL-MCNC: 9.2 MG/DL (ref 8.5–10.1)
CHLORIDE SERPL-SCNC: 102 MMOL/L (ref 98–107)
CO2 SERPL-SCNC: 29 MMOL/L (ref 21–32)
CREAT SERPL-MCNC: 0.3 MG/DL (ref 0.6–1.3)
EOSINOPHIL NFR BLD AUTO: 1 % (ref 0–6)
GLUCOSE SERPL-MCNC: 113 MG/DL (ref 74–106)
HCT VFR BLD AUTO: 41 % (ref 33–45)
HGB BLD-MCNC: 13.5 G/DL (ref 11.5–14.8)
LYMPHOCYTES NFR BLD AUTO: 1.3 /CMM (ref 0.8–4.8)
LYMPHOCYTES NFR BLD AUTO: 23.1 % (ref 20–44)
MAGNESIUM SERPL-MCNC: 2.2 MG/DL (ref 1.8–2.4)
MCHC RBC AUTO-ENTMCNC: 33 G/DL (ref 31–36)
MCV RBC AUTO: 97 FL (ref 82–100)
MONOCYTES NFR BLD AUTO: 0.3 /CMM (ref 0.1–1.3)
MONOCYTES NFR BLD AUTO: 4.7 % (ref 2–12)
NEUTROPHILS # BLD AUTO: 4 /CMM (ref 1.8–8.9)
NEUTROPHILS NFR BLD AUTO: 70.4 % (ref 43–81)
PHOSPHATE SERPL-MCNC: 3.7 MG/DL (ref 2.5–4.9)
PLATELET # BLD AUTO: 160 /CMM (ref 150–450)
POTASSIUM SERPL-SCNC: 4 MMOL/L (ref 3.5–5.1)
RBC # BLD AUTO: 4.22 MIL/UL (ref 4–5.2)
SODIUM SERPL-SCNC: 137 MMOL/L (ref 136–145)
WBC NRBC COR # BLD AUTO: 5.7 K/UL (ref 4.3–11)

## 2020-07-19 RX ADMIN — SODIUM CHLORIDE SCH MLS/HR: 9 INJECTION, SOLUTION INTRAVENOUS at 21:30

## 2020-07-19 RX ADMIN — SODIUM CHLORIDE SCH MG: 9 INJECTION, SOLUTION INTRAVENOUS at 12:44

## 2020-07-19 RX ADMIN — ENOXAPARIN SODIUM SCH MG: 40 INJECTION SUBCUTANEOUS at 16:03

## 2020-07-19 RX ADMIN — DEXTROSE AND SODIUM CHLORIDE PRN MLS/HR: 5; 450 INJECTION, SOLUTION INTRAVENOUS at 19:50

## 2020-07-19 RX ADMIN — DEXTROSE AND SODIUM CHLORIDE PRN MLS/HR: 5; 450 INJECTION, SOLUTION INTRAVENOUS at 06:09

## 2020-07-19 RX ADMIN — DEXTROSE MONOHYDRATE SCH MLS/HR: 50 INJECTION, SOLUTION INTRAVENOUS at 15:59

## 2020-07-19 NOTE — NUR
MS RN OPENING  NOTES





PATIENT IS LAYING IN BED. PATIENT IS NONVERBAL. ON 4L NASAL CANULA, NO SOB/ ACUTE 
RESPIRATORY DISTRESS NOTED CURRENTLY NPO WILL F/U DIET. IV IN R HAND #22G IS PATENT AND 
INTACT RUNNING D5 1/2 NS @ 75MLS/HR. BED IS IN LOWEST LOCKED POSITION WITH SIDE RAILS UP X3, 
SEMI FOWLERS. SEIZURE PRECAUTIONS IN PLACE, SIDE RAILS PADDED. CALL LIGHT IS WITHIN REACH. 
WILL CONTINUE TO MONITOR.

## 2020-07-19 NOTE — NUR
MS RN CLOSE NOTES





PATIENT IS LAYING IN BED. PATIENT IS NONVERBAL. ON 4L NASAL CANULA, NO SOB/ ACUTE 
RESPIRATORY DISTRESS NOTED. IV IN R HAND #22G IS PATENT AND INTACT RUNNING D5 1/2 NS @ 
75MLS/HR. BED IS IN LOWEST LOCKED POSITION WITH SIDE RAILS UP X3, SEMI FOWLERS. SEIZURE 
PRECAUTIONS IN PLACE, SIDE RAILS PADDED. CALL LIGHT IS WITHIN REACH. WILL ENDORSE TO AM 
NURSE.

## 2020-07-19 NOTE — NUR
MS RN OPEN NOTES



PATIENT IS LAYING IN BED. NONVERBAL. ON 4L NASAL CANULA, NO SOB/ ACUTE RESPIRATORY DISTRESS 
NOTED. APPEARS COMFORTABLE, SHOWS NO SIGN OF PAIN/ DISTRESS AT THE MOMENT. BED IS IN LOWEST 
LOCKED POSITION WITH SIDE RAILS UP X3, SEMI FOWLERS. CALL LIGHT IS WITHIN REACH. WILL 
CONTINUE TO MONITOR.

## 2020-07-19 NOTE — NUR
MS RN CLOSING NOTES





PATIENT IS IN BED HOB ELEVATED. PATIENT IS NONVERBAL. ON 4L NASAL CANULA, NO SOB/ ACUTE 
RESPIRATORY DISTRESS NOTED. REMAIN NPO . IV IN R HAND #22G IS PATENT AND INTACT RUNNING D5 
1/2 NS @ 75MLS/HR. BED IS IN LOWEST LOCKED POSITION WITH SIDE RAILS UP X3, SEMI FOWLERS. 
SEIZURE PRECAUTIONS IN PLACE, SIDE RAILS PADDED SEEN AND EXAM BY THE NEUROLOGO  WITH NEW 
ORDER FOR KEPPRA. CALL LIGHT IS WITHIN REACH. WILL ENDORSE TO NIGHT NURSE.

## 2020-07-20 VITALS — SYSTOLIC BLOOD PRESSURE: 123 MMHG | DIASTOLIC BLOOD PRESSURE: 87 MMHG

## 2020-07-20 VITALS — SYSTOLIC BLOOD PRESSURE: 128 MMHG | DIASTOLIC BLOOD PRESSURE: 68 MMHG

## 2020-07-20 VITALS — SYSTOLIC BLOOD PRESSURE: 125 MMHG | DIASTOLIC BLOOD PRESSURE: 46 MMHG

## 2020-07-20 VITALS — DIASTOLIC BLOOD PRESSURE: 68 MMHG | SYSTOLIC BLOOD PRESSURE: 128 MMHG

## 2020-07-20 LAB
BASOPHILS # BLD AUTO: 0 /CMM (ref 0–0.2)
BASOPHILS NFR BLD AUTO: 0.9 % (ref 0–2)
BUN SERPL-MCNC: 3 MG/DL (ref 7–18)
CALCIUM SERPL-MCNC: 8.9 MG/DL (ref 8.5–10.1)
CHLORIDE SERPL-SCNC: 104 MMOL/L (ref 98–107)
CO2 SERPL-SCNC: 31 MMOL/L (ref 21–32)
CREAT SERPL-MCNC: 0.3 MG/DL (ref 0.6–1.3)
EOSINOPHIL NFR BLD AUTO: 1.4 % (ref 0–6)
GLUCOSE SERPL-MCNC: 95 MG/DL (ref 74–106)
HCT VFR BLD AUTO: 38 % (ref 33–45)
HGB BLD-MCNC: 12.5 G/DL (ref 11.5–14.8)
LYMPHOCYTES NFR BLD AUTO: 1.6 /CMM (ref 0.8–4.8)
LYMPHOCYTES NFR BLD AUTO: 35 % (ref 20–44)
MAGNESIUM SERPL-MCNC: 2.1 MG/DL (ref 1.8–2.4)
MCHC RBC AUTO-ENTMCNC: 33 G/DL (ref 31–36)
MCV RBC AUTO: 97 FL (ref 82–100)
MONOCYTES NFR BLD AUTO: 0.3 /CMM (ref 0.1–1.3)
MONOCYTES NFR BLD AUTO: 7.2 % (ref 2–12)
NEUTROPHILS # BLD AUTO: 2.6 /CMM (ref 1.8–8.9)
NEUTROPHILS NFR BLD AUTO: 55.5 % (ref 43–81)
PHOSPHATE SERPL-MCNC: 3.5 MG/DL (ref 2.5–4.9)
PLATELET # BLD AUTO: 150 /CMM (ref 150–450)
POTASSIUM SERPL-SCNC: 3.5 MMOL/L (ref 3.5–5.1)
RBC # BLD AUTO: 3.91 MIL/UL (ref 4–5.2)
SODIUM SERPL-SCNC: 139 MMOL/L (ref 136–145)
WBC NRBC COR # BLD AUTO: 4.7 K/UL (ref 4.3–11)

## 2020-07-20 RX ADMIN — SODIUM CHLORIDE SCH MG: 9 INJECTION, SOLUTION INTRAVENOUS at 12:43

## 2020-07-20 RX ADMIN — DEXTROSE AND SODIUM CHLORIDE PRN MLS/HR: 5; 450 INJECTION, SOLUTION INTRAVENOUS at 05:16

## 2020-07-20 RX ADMIN — SODIUM CHLORIDE SCH MLS/HR: 9 INJECTION, SOLUTION INTRAVENOUS at 21:11

## 2020-07-20 RX ADMIN — ENOXAPARIN SODIUM SCH MG: 40 INJECTION SUBCUTANEOUS at 15:13

## 2020-07-20 RX ADMIN — DEXTROSE AND SODIUM CHLORIDE PRN MLS/HR: 5; 450 INJECTION, SOLUTION INTRAVENOUS at 19:13

## 2020-07-20 NOTE — NUR
MS RN CLOSE NOTES





PATIENT IS LAYING IN BED. A/O X1, NONVERBAL. ON 4L NASAL CANULA, NO SOB/ ACUTE RESPIRATORY 
DISTRESS NOTED. IV IN R HAND #22G IS PATENT AND INTACT RUNNING D5 1/2NS @ 75MLS/HR. APPEARS 
COMFORTABLE, SHOWS NO SIGNS OF PAIN/ DISTRESS. BED IS IN LOWEST LOCKED POSITION WITH SIDE 
RAILS UP X3, SEMI FOWLERS. SEIZURE PRECAUTIONS IN PLACE/ SIDE RAILS PADDED. CALL LIGHT IS 
WITHIN REACH. WILL ENDORSE TO AM NURSE.

## 2020-07-20 NOTE — NUR
RN CLOSING NOTES



PATIENT IN BED, ASLEEP. PT IS AROUSABLE OPENS EYES,. PATIENT IS NONVERBAL. NO CARDIAC OR 
RESPIRATORY DISTRESS NOTED ON 4L NASAL CANULA, NO SOB/ ACUTE RESPIRATORY DISTRESS NOTED.  IV 
ACCESS NOTED ON R HAND #22G IS PATENT AND INTACT RUNNING D5 1/2 NS @ 75MLS/HR. PADDINGON 
SIDE RAILS NOTED FOR SEIZURE PRECAUTIONS. SAFETY PRECAUTIONS IN PLACE.  BED IS IN LOWEST 
LOCKED POSITION WITH SIDE RAILS UP X2, SEMI FOWLERS. S CALL LIGHT IS WITHIN REACH. WILL CONT 
TO MONITOR

## 2020-07-20 NOTE — NUR
SWALLOW EVAL



PT WAS SEEN BY ST. PT STARTED ON PUREED DIET WITH HONEY THICK LIQUID. HIGH RISK FOR 
ASPIRATION. CNA NOTIFIED TO KEEP PY UPRIGHT DURING FEEDING

## 2020-07-20 NOTE — NUR
RN OPENING NOTES



RECEIVED PATIENT IN BED, ASLEEP. PT IS AROUSABLE OPENS EYES,. PATIENT IS NONVERBAL. NO 
CARDIAC OR RESPIRATORY DISTRESS NOTED ON 4L NASAL CANULA, NO SOB/ ACUTE RESPIRATORY DISTRESS 
NOTED. PT HAS BEEN NPO PER NIGHT SHIFT REPORT. AWAITING ORDERS FOR SWALLOW EVAL. IV ACCESS 
NOTED ON R HAND #22G IS PATENT AND INTACT RUNNING D5 1/2 NS @ 75MLS/HR. PADDINGON SIDE RAILS 
NOTED FOR SEIZURE PRECAUTIONS. SAFETY PRECAUTIONS IN PLACE.  BED IS IN LOWEST LOCKED 
POSITION WITH SIDE RAILS UP X2, SEMI FOWLERS. S CALL LIGHT IS WITHIN REACH. WILL CONT TO 
MONITOR

## 2020-07-20 NOTE — NUR
MS RN OPENING NOTE:



Received patient in bed, sleeping. Patient is arousable. Opens her yes but non verbal. On 4L 
nasal canula. No SOB or respiratory distress noted. IV noted on right hand 22g. Flushes 
well, patent, no redness, or infiltration. Side rails are padded. Safety precaution in 
place. Bed in lowest level, brakes are on, call light is near, and alarm is on. Will 
continue care of plan.

## 2020-07-20 NOTE — NUR
MS RN NOTE:



Noted patient prescribed Levaquin and noted patient allergic to Levaquin. Notified Dr Keke BYERS. MD ordered to DC Levaquin and order Keflex 500mg po tid. Read order back and carried 
out.

## 2020-07-21 VITALS — SYSTOLIC BLOOD PRESSURE: 123 MMHG | DIASTOLIC BLOOD PRESSURE: 87 MMHG

## 2020-07-21 VITALS — SYSTOLIC BLOOD PRESSURE: 149 MMHG | DIASTOLIC BLOOD PRESSURE: 77 MMHG

## 2020-07-21 VITALS — SYSTOLIC BLOOD PRESSURE: 131 MMHG | DIASTOLIC BLOOD PRESSURE: 64 MMHG

## 2020-07-21 VITALS — DIASTOLIC BLOOD PRESSURE: 67 MMHG | SYSTOLIC BLOOD PRESSURE: 118 MMHG

## 2020-07-21 VITALS — SYSTOLIC BLOOD PRESSURE: 125 MMHG | DIASTOLIC BLOOD PRESSURE: 66 MMHG

## 2020-07-21 VITALS — SYSTOLIC BLOOD PRESSURE: 120 MMHG | DIASTOLIC BLOOD PRESSURE: 69 MMHG

## 2020-07-21 LAB
ALBUMIN SERPL BCP-MCNC: 2.8 G/DL (ref 3.4–5)
ALP SERPL-CCNC: 81 U/L (ref 46–116)
ALT SERPL W P-5'-P-CCNC: 24 U/L (ref 12–78)
AST SERPL W P-5'-P-CCNC: 22 U/L (ref 15–37)
BASOPHILS # BLD AUTO: 0 /CMM (ref 0–0.2)
BASOPHILS NFR BLD AUTO: 0.9 % (ref 0–2)
BILIRUB SERPL-MCNC: 0.4 MG/DL (ref 0.2–1)
BUN SERPL-MCNC: 3 MG/DL (ref 7–18)
CALCIUM SERPL-MCNC: 8.5 MG/DL (ref 8.5–10.1)
CHLORIDE SERPL-SCNC: 103 MMOL/L (ref 98–107)
CO2 SERPL-SCNC: 30 MMOL/L (ref 21–32)
CREAT SERPL-MCNC: 0.3 MG/DL (ref 0.6–1.3)
EOSINOPHIL NFR BLD AUTO: 1.5 % (ref 0–6)
GLUCOSE SERPL-MCNC: 99 MG/DL (ref 74–106)
HCT VFR BLD AUTO: 38 % (ref 33–45)
HGB BLD-MCNC: 12.5 G/DL (ref 11.5–14.8)
LYMPHOCYTES NFR BLD AUTO: 1.7 /CMM (ref 0.8–4.8)
LYMPHOCYTES NFR BLD AUTO: 30.7 % (ref 20–44)
MAGNESIUM SERPL-MCNC: 2.1 MG/DL (ref 1.8–2.4)
MCHC RBC AUTO-ENTMCNC: 33 G/DL (ref 31–36)
MCV RBC AUTO: 97 FL (ref 82–100)
MONOCYTES NFR BLD AUTO: 0.3 /CMM (ref 0.1–1.3)
MONOCYTES NFR BLD AUTO: 5.5 % (ref 2–12)
NEUTROPHILS # BLD AUTO: 3.3 /CMM (ref 1.8–8.9)
NEUTROPHILS NFR BLD AUTO: 61.4 % (ref 43–81)
PHOSPHATE SERPL-MCNC: 3.1 MG/DL (ref 2.5–4.9)
PLATELET # BLD AUTO: 147 /CMM (ref 150–450)
POTASSIUM SERPL-SCNC: 3 MMOL/L (ref 3.5–5.1)
PROT SERPL-MCNC: 6.3 G/DL (ref 6.4–8.2)
RBC # BLD AUTO: 3.93 MIL/UL (ref 4–5.2)
SODIUM SERPL-SCNC: 138 MMOL/L (ref 136–145)
WBC NRBC COR # BLD AUTO: 5.4 K/UL (ref 4.3–11)

## 2020-07-21 RX ADMIN — DEXTROSE AND SODIUM CHLORIDE PRN MLS/HR: 5; 450 INJECTION, SOLUTION INTRAVENOUS at 11:33

## 2020-07-21 RX ADMIN — SODIUM CHLORIDE SCH MLS/HR: 9 INJECTION, SOLUTION INTRAVENOUS at 21:40

## 2020-07-21 RX ADMIN — SODIUM CHLORIDE, SODIUM LACTATE, POTASSIUM CHLORIDE, AND CALCIUM CHLORIDE SCH MLS/HR: .6; .31; .03; .02 INJECTION, SOLUTION INTRAVENOUS at 16:06

## 2020-07-21 RX ADMIN — SODIUM CHLORIDE SCH MG: 9 INJECTION, SOLUTION INTRAVENOUS at 12:43

## 2020-07-21 RX ADMIN — SODIUM CHLORIDE, SODIUM LACTATE, POTASSIUM CHLORIDE, AND CALCIUM CHLORIDE SCH MLS/HR: .6; .31; .03; .02 INJECTION, SOLUTION INTRAVENOUS at 15:31

## 2020-07-21 RX ADMIN — SODIUM CHLORIDE, SODIUM LACTATE, POTASSIUM CHLORIDE, AND CALCIUM CHLORIDE SCH MLS/HR: .6; .31; .03; .02 INJECTION, SOLUTION INTRAVENOUS at 13:48

## 2020-07-21 RX ADMIN — ENOXAPARIN SODIUM SCH MG: 40 INJECTION SUBCUTANEOUS at 15:32

## 2020-07-21 RX ADMIN — SODIUM CHLORIDE, SODIUM LACTATE, POTASSIUM CHLORIDE, AND CALCIUM CHLORIDE SCH MLS/HR: .6; .31; .03; .02 INJECTION, SOLUTION INTRAVENOUS at 12:43

## 2020-07-21 RX ADMIN — SODIUM CHLORIDE, SODIUM LACTATE, POTASSIUM CHLORIDE, AND CALCIUM CHLORIDE SCH MLS/HR: .6; .31; .03; .02 INJECTION, SOLUTION INTRAVENOUS at 17:00

## 2020-07-21 RX ADMIN — NITROFURANTOIN MONOHYDRATE AND NITROFURANTOIN, MACROCRYSTALLINE SCH MG: 75; 25 CAPSULE ORAL at 20:45

## 2020-07-21 RX ADMIN — SODIUM CHLORIDE, SODIUM LACTATE, POTASSIUM CHLORIDE, AND CALCIUM CHLORIDE SCH MLS/HR: .6; .31; .03; .02 INJECTION, SOLUTION INTRAVENOUS at 11:29

## 2020-07-21 NOTE — NUR
MS RN CLOSING NOTE:



Patient in bed sleeping comfortably. Patient is arousable. No SOB or respiratory distress at 
the moment. Side rails are padded. Safety precaution in place. Bed in lowest level, brakes 
are on, call light is near, and alarm is on. Will endorse to next shift.

## 2020-07-21 NOTE — NUR
MS RN NOTES



PATIENT IN BED, OPENS EYES, NON VERBAL. BREATHING EVEN AND UNLABORED ON NC 4L. SHOWS NO 
SIGNS OF ACUTE RESPIRATORY DISTRESS, NO ACUTE PAIN. IV ON R HAND 22G RUNNING D5 1/2 NS AT 
75ML/HR. SHOWS NO SIGNS OF ACUTE RESPIRATORY DISTRESS, NO ACUTE PAIN. SAFETY PRECAUTIONS IN 
PLACE. BED IN LOWEST POSITION, LOCKED, AND CALL LIGHT KEPT WITHIN REACH. WILL CONTINUE TO 
MONITOR.

## 2020-07-21 NOTE — NUR
RN NOTES

 RECEIVED PATIENT IN THE BED, NON VERBAL, PATIENT HAS NO ACUTE RESPIRATORY DISTRESS, V/S 
STABLE, PATIENT SLEEPING AT THIS TIME,  INFUSING D51/2 NS AT  75 ML/HR INTACT ON LEFT HAND, 
PATIENT TOTAL CARE, ASSIST TURN AND REPOSTION Q 2 HR.  CALL LIGHT WITHIN TO REACH. CONTINUED 
MONITORING.

## 2020-07-21 NOTE — NUR
RN NOTES

PATIENT STABLE,  NEEDS ATTENDED  AND ANTICIPATED. ASSIST EATING TOLERATED DINNER 25%,TURN 
AND REPOSTION Q 2 HR,KEEP HOB ELEVATED FOR ASPIRATION PRECAUTION,  INFUSING D51/2 NS AT 75 
ML/HR INTACT. CALL LIGHT WITHIN TO REACH. CONTINUED MONITORING. ENDORSED ONCOMING NURSE 
FOLLOW PLAN OF CARE.

## 2020-07-21 NOTE — NUR
RN NOTES

 PER PATIENT FAMILY APPEAL CASE TO DISCHARGE. ASSIST TURN AND REPOSTION PATIENT STABLE V/S 
WNL.

## 2020-07-22 VITALS — SYSTOLIC BLOOD PRESSURE: 157 MMHG | DIASTOLIC BLOOD PRESSURE: 71 MMHG

## 2020-07-22 VITALS — SYSTOLIC BLOOD PRESSURE: 139 MMHG | DIASTOLIC BLOOD PRESSURE: 101 MMHG

## 2020-07-22 VITALS — DIASTOLIC BLOOD PRESSURE: 101 MMHG | SYSTOLIC BLOOD PRESSURE: 139 MMHG

## 2020-07-22 VITALS — DIASTOLIC BLOOD PRESSURE: 71 MMHG | SYSTOLIC BLOOD PRESSURE: 157 MMHG

## 2020-07-22 VITALS — SYSTOLIC BLOOD PRESSURE: 128 MMHG | DIASTOLIC BLOOD PRESSURE: 96 MMHG

## 2020-07-22 VITALS — DIASTOLIC BLOOD PRESSURE: 84 MMHG | SYSTOLIC BLOOD PRESSURE: 148 MMHG

## 2020-07-22 LAB
BUN SERPL-MCNC: 2 MG/DL (ref 7–18)
CALCIUM SERPL-MCNC: 9.8 MG/DL (ref 8.5–10.1)
CHLORIDE SERPL-SCNC: 102 MMOL/L (ref 98–107)
CO2 SERPL-SCNC: 28 MMOL/L (ref 21–32)
CREAT SERPL-MCNC: 0.2 MG/DL (ref 0.6–1.3)
GLUCOSE SERPL-MCNC: 85 MG/DL (ref 74–106)
POTASSIUM SERPL-SCNC: 4.2 MMOL/L (ref 3.5–5.1)
SODIUM SERPL-SCNC: 137 MMOL/L (ref 136–145)

## 2020-07-22 RX ADMIN — NITROFURANTOIN MONOHYDRATE AND NITROFURANTOIN, MACROCRYSTALLINE SCH MG: 75; 25 CAPSULE ORAL at 10:07

## 2020-07-22 RX ADMIN — MAGNESIUM HYDROXIDE PRN ML: 400 SUSPENSION ORAL at 06:26

## 2020-07-22 RX ADMIN — NITROFURANTOIN MONOHYDRATE AND NITROFURANTOIN, MACROCRYSTALLINE SCH MG: 75; 25 CAPSULE ORAL at 21:36

## 2020-07-22 RX ADMIN — ENOXAPARIN SODIUM SCH MG: 40 INJECTION SUBCUTANEOUS at 16:29

## 2020-07-22 RX ADMIN — DEXTROSE AND SODIUM CHLORIDE PRN MLS/HR: 5; 450 INJECTION, SOLUTION INTRAVENOUS at 09:38

## 2020-07-22 RX ADMIN — SODIUM CHLORIDE SCH MLS/HR: 9 INJECTION, SOLUTION INTRAVENOUS at 09:32

## 2020-07-22 RX ADMIN — SODIUM CHLORIDE SCH MG: 9 INJECTION, SOLUTION INTRAVENOUS at 12:50

## 2020-07-22 RX ADMIN — SODIUM CHLORIDE SCH MLS/HR: 9 INJECTION, SOLUTION INTRAVENOUS at 22:24

## 2020-07-22 NOTE — NUR
MS RN NOTES



PATIENT RECEIVED IN BED, NON-VERBAL, OPEN EYES TO TOUCH. PATIENT ON NASAL CANNULA 4L, 
TOLERATING OXYGEN WITH NO SIGNS OF RESPIRATORY DISTRESS AT THIS TIME, WITH EVEN NON-LABORED 
BREATHING, AND NO SOB NOTED. PATIENT SKIN WARM AND DRY TO TOUCH, AND IV ACCESS INTACT AND 
PATENT, INFUSING D5 1/2 NORMAL SALINE AT 75ml/hr. PATIENT PRESENTS NO SIGNS OF PAIN OR 
DISCOMFORT. SAFETY PRECAUTIONS AND SEIZURE PRECAUTIONS IN PLACE WITH BED IN THE LOWEST 
POSITION, BED LOCKED, BED ALARM ON, BILATERAL SIDE RAILS UP AND PADDED, AND CALL LIGHT 
WITHIN EASY REACH OF THE PATIENT. WILL CONTINUE TO MONITOR PATIENT.

## 2020-07-22 NOTE — NUR
MS RN NOTES



PATIENT IN BED COMFORTABLY LAYING, NON-VERBAL, AND OPEN EYES TO TOUCH. PATIENT ON NASAL 
CANNULA 3L, TOLERATING OXYGEN WITH NO SIGNS OF RESPIRATORY DISTRESS AT THIS TIME, WITH EVEN 
NON-LABORED BREATHING, AND NO SOB NOTED. PATIENT SKIN KEPT CLEAN, WARM AND DRY TO TOUCH. IV 
ACCESS INTACT AND PATENT, INFUSING D5 1/2 NORMAL SALINE AT 75ml/hr. PATIENT PRESENTS NO 
SIGNS OF PAIN OR DISCOMFORT. SAFETY PRECAUTIONS, SEIZURE PRECAUTIONS AND ASPIRATION 
PRECAUTIONS IN PLACE WITH BED IN THE LOWEST POSITION, HEAD OF THE BED IN 40 DEGREES 
ELEVATED, BED LOCKED, BED ALARM ON, BILATERAL SIDE RAILS UP AND PADDED, AND CALL LIGHT 
WITHIN EASY REACH OF THE PATIENT. WILL ENDORSE PLAN OF CARE TO UPCOMING NURSE.

## 2020-07-22 NOTE — NUR
MS RN NOTES



PATIENT SEEN BY DR SIERRA, PATIENT CLEARED FOR DISCHARGE, BUT FAMILY APPEALED, MD AWARE. 
NO NEW ORDERS AT THIS TIME. WILL CONTINUE TO MONITOR PATIENT.

## 2020-07-22 NOTE — NUR
MS RN NOTES



PATIENT IN BED, OPENS EYES, NON VERBAL. BREATHING EVEN AND UNLABORED ON NC 4L. SHOWS NO 
SIGNS OF ACUTE RESPIRATORY DISTRESS, NO ACUTE PAIN. IV ON R HAND 22G RUNNING D5 1/2 NS AT 
75ML/HR. SHOWS NO SIGNS OF ACUTE RESPIRATORY DISTRESS, NO ACUTE PAIN. ALL DUE MEDICATIONS 
GIVEN. SAFETY PRECAUTIONS IN PLACE. BED IN LOWEST POSITION, LOCKED, AND CALL LIGHT KEPT 
WITHIN REACH. WILL ENDORSE TO ONCOMING NURSE.

## 2020-07-22 NOTE — NUR
RN NOTES



RECEIVED PATIENT AWAKE IN BED, NON-VERBAL, OPEN EYES TO TOUCH. PATIENT ON NASAL CANNULA 3L, 
TOLERATING OXYGEN WITH NO SIGNS OF RESPIRATORY DISTRESS AT THIS TIME, WITH EVEN NON-LABORED 
BREATHING, AND NO SOB NOTED. PATIENT SKIN WARM AND DRY TO TOUCH, AND IV ACCESS INTACT AND 
PATENT, INFUSING D5 1/2 NORMAL SALINE AT 75ml/hr. PATIENT PRESENTS NO SIGNS OF PAIN OR 
DISCOMFORT. SAFETY PRECAUTIONS AND SEIZURE PRECAUTIONS IN PLACE WITH BED IN THE LOWEST 
POSITION, BED LOCKED, BED ALARM ON, BILATERAL SIDE RAILS UP AND PADDED, AND CALL LIGHT 
WITHIN EASY REACH OF THE PATIENT. WILL CONTINUE TO MONITOR ACCORDINGLY.

## 2020-07-23 VITALS — DIASTOLIC BLOOD PRESSURE: 81 MMHG | SYSTOLIC BLOOD PRESSURE: 143 MMHG

## 2020-07-23 VITALS — DIASTOLIC BLOOD PRESSURE: 61 MMHG | SYSTOLIC BLOOD PRESSURE: 132 MMHG

## 2020-07-23 VITALS — DIASTOLIC BLOOD PRESSURE: 89 MMHG | SYSTOLIC BLOOD PRESSURE: 131 MMHG

## 2020-07-23 VITALS — DIASTOLIC BLOOD PRESSURE: 58 MMHG | SYSTOLIC BLOOD PRESSURE: 136 MMHG

## 2020-07-23 VITALS — DIASTOLIC BLOOD PRESSURE: 83 MMHG | SYSTOLIC BLOOD PRESSURE: 161 MMHG

## 2020-07-23 VITALS — SYSTOLIC BLOOD PRESSURE: 131 MMHG | DIASTOLIC BLOOD PRESSURE: 78 MMHG

## 2020-07-23 RX ADMIN — ENOXAPARIN SODIUM SCH MG: 40 INJECTION SUBCUTANEOUS at 17:20

## 2020-07-23 RX ADMIN — NITROFURANTOIN MONOHYDRATE AND NITROFURANTOIN, MACROCRYSTALLINE SCH MG: 75; 25 CAPSULE ORAL at 21:42

## 2020-07-23 RX ADMIN — DEXTROSE AND SODIUM CHLORIDE PRN MLS/HR: 5; 450 INJECTION, SOLUTION INTRAVENOUS at 23:17

## 2020-07-23 RX ADMIN — SODIUM CHLORIDE SCH MLS/HR: 9 INJECTION, SOLUTION INTRAVENOUS at 09:08

## 2020-07-23 RX ADMIN — SODIUM CHLORIDE SCH MG: 9 INJECTION, SOLUTION INTRAVENOUS at 13:19

## 2020-07-23 RX ADMIN — SODIUM CHLORIDE SCH MLS/HR: 9 INJECTION, SOLUTION INTRAVENOUS at 21:42

## 2020-07-23 RX ADMIN — DEXTROSE AND SODIUM CHLORIDE PRN MLS/HR: 5; 450 INJECTION, SOLUTION INTRAVENOUS at 09:15

## 2020-07-23 RX ADMIN — NITROFURANTOIN MONOHYDRATE AND NITROFURANTOIN, MACROCRYSTALLINE SCH MG: 75; 25 CAPSULE ORAL at 09:08

## 2020-07-23 NOTE — NUR
MS RN NOTES



RECEIVED PT IN BED AND AWAKE.  PT NON-VERBAL AND OPENS EYES.  RESPIRATIONS EVEN AND 
UNLABORED WITH NO S/S OF ACUTE DISTRESS OR SOB NOTED.  NO S/S OF PAIN AT THIS TIME.  PT 
NOTED WITH IV ON RTHUMB #24G PATENT AND INTACT INFUSING D5 1/2 NS @75CC/HR.  SAFETY MEASURES 
IN PLACE WITH BED IN LOWEST LOCKED POSITION WITH SIDE RAILS UP X2.  CALL LIGHT WITHIN REACH. 
 WILL CONTINUE TO MONITOR.

## 2020-07-23 NOTE — NUR
RN NOTES



ALL NEEDS ATTENDED, ABLE TO REST AND SLEPT WITH LONG INTERVALS, SAFETY MEASURES IN PLACE, 
CALL LIGHT WITHIN EASY REACH, REPOSITIONED FOR COMFORT ASPIRATION PRECAUTION EMPHASIZED. AS 
PER PATIENT'S DAUGHTER REQUEST TO HAVE FOR MEALS ARE THE FOLLOWING: GREEN BEANS, MIXED 
VEGGIES, CARROTS, PEAS WITH SOME SALT IN IT, MOIST FISH, AND OR TURKEY. PATIENT REQUIRES A 
FEEDER DURING MEAL. CALLED AND SPOKE WITH JIMENA OF [employer]. REGARDING 
FAMILY'S REQUEST FOR FOOD PREFERENCE. ALL NEEDS ANTICIPATED. KEPT CLEAN WARM DRY AND 
COMFORTABLE. WILL ENDORSE TO AM NURSE FOR CONTINUITY OF CARE.

## 2020-07-23 NOTE — NUR
RN NOTES

 PATIENT STABLE TOLERATED DINNER WELL, ASSIST TURN AND REPOSITION Q 2 HR. NEEDS ATTENDED  
AND ANTICIPATED,   CALL LIGHT WITHIN TO RREACH, . ENDORSED ONCOMING NURSE FOLLOW PLAN OF 
CARE.

## 2020-07-23 NOTE — NUR
RN NOTES

 RECEIVED PATIENT IN THE BED, NON VERBAL, PATIENT HAS NO ACUTE RESPIRATORY DISTRESS, V/S 
STABLE, PATIENT SLEEPING AT THIS TIME,  INFUSING D5 1/2 NS AT  75 ML/HR INTACT ON LEFT HAND, 
PATIENT TOTAL CARE, ADMINISTERED SCHEDULED MEDICATION, PATIENT ASPIRATION PRECAUTION, KEEP 
HOB ELEVATED TO 40 DEGREE,  ASSIST TURN AND REPOSTION Q 2 HR.  CALL LIGHT WITHIN TO REACH. 
PATIENT TOLERATED INTAKE 75% VIA ASSIST OF CNA. CONTINUED MONITORING.

## 2020-07-24 VITALS — DIASTOLIC BLOOD PRESSURE: 67 MMHG | SYSTOLIC BLOOD PRESSURE: 132 MMHG

## 2020-07-24 VITALS — DIASTOLIC BLOOD PRESSURE: 78 MMHG | SYSTOLIC BLOOD PRESSURE: 158 MMHG

## 2020-07-24 VITALS — DIASTOLIC BLOOD PRESSURE: 84 MMHG | SYSTOLIC BLOOD PRESSURE: 129 MMHG

## 2020-07-24 LAB
BASOPHILS # BLD AUTO: 0.1 /CMM (ref 0–0.2)
BASOPHILS NFR BLD AUTO: 1.1 % (ref 0–2)
BUN SERPL-MCNC: 5 MG/DL (ref 7–18)
CALCIUM SERPL-MCNC: 9 MG/DL (ref 8.5–10.1)
CHLORIDE SERPL-SCNC: 102 MMOL/L (ref 98–107)
CO2 SERPL-SCNC: 28 MMOL/L (ref 21–32)
CREAT SERPL-MCNC: 0.3 MG/DL (ref 0.6–1.3)
EOSINOPHIL NFR BLD AUTO: 1.6 % (ref 0–6)
GLUCOSE SERPL-MCNC: 97 MG/DL (ref 74–106)
HCT VFR BLD AUTO: 43 % (ref 33–45)
HGB BLD-MCNC: 13.5 G/DL (ref 11.5–14.8)
LYMPHOCYTES NFR BLD AUTO: 1.6 /CMM (ref 0.8–4.8)
LYMPHOCYTES NFR BLD AUTO: 30.8 % (ref 20–44)
MCHC RBC AUTO-ENTMCNC: 31 G/DL (ref 31–36)
MCV RBC AUTO: 102 FL (ref 82–100)
MONOCYTES NFR BLD AUTO: 0.3 /CMM (ref 0.1–1.3)
MONOCYTES NFR BLD AUTO: 6 % (ref 2–12)
NEUTROPHILS # BLD AUTO: 3.1 /CMM (ref 1.8–8.9)
NEUTROPHILS NFR BLD AUTO: 60.5 % (ref 43–81)
PLATELET # BLD AUTO: 110 /CMM (ref 150–450)
POTASSIUM SERPL-SCNC: 3.5 MMOL/L (ref 3.5–5.1)
RBC # BLD AUTO: 4.25 MIL/UL (ref 4–5.2)
SODIUM SERPL-SCNC: 137 MMOL/L (ref 136–145)
WBC NRBC COR # BLD AUTO: 5.1 K/UL (ref 4.3–11)

## 2020-07-24 RX ADMIN — MAGNESIUM HYDROXIDE PRN ML: 400 SUSPENSION ORAL at 21:36

## 2020-07-24 RX ADMIN — NITROFURANTOIN MONOHYDRATE AND NITROFURANTOIN, MACROCRYSTALLINE SCH MG: 75; 25 CAPSULE ORAL at 21:37

## 2020-07-24 RX ADMIN — ENOXAPARIN SODIUM SCH MG: 40 INJECTION SUBCUTANEOUS at 16:40

## 2020-07-24 RX ADMIN — SODIUM CHLORIDE SCH MG: 9 INJECTION, SOLUTION INTRAVENOUS at 13:19

## 2020-07-24 RX ADMIN — SODIUM CHLORIDE SCH MLS/HR: 9 INJECTION, SOLUTION INTRAVENOUS at 21:36

## 2020-07-24 RX ADMIN — DEXTROSE AND SODIUM CHLORIDE PRN MLS/HR: 5; 450 INJECTION, SOLUTION INTRAVENOUS at 15:04

## 2020-07-24 RX ADMIN — SODIUM CHLORIDE SCH MLS/HR: 9 INJECTION, SOLUTION INTRAVENOUS at 09:37

## 2020-07-24 RX ADMIN — DOCUSATE SODIUM SCH MG: 250 CAPSULE, LIQUID FILLED ORAL at 13:19

## 2020-07-24 RX ADMIN — NITROFURANTOIN MONOHYDRATE AND NITROFURANTOIN, MACROCRYSTALLINE SCH MG: 75; 25 CAPSULE ORAL at 09:37

## 2020-07-24 NOTE — NUR
MS RN NOTES

PATIENT IN BED AWAKE. NO ACUTE DISTRESS NOTED. BREATHING UNLABORED. NO SOB NOTED. IV ACCESS 
PATENT AND INTACT, NO REDNESS, NO SWELLING NOTED. NEEDS ATTENDED AND ANTICIPATED. KEPT 
COMFORTABLE. HEAD OF BED ELEVATED. SAFETY MEASURES IN PLACE. CALL LIGHT WITHIN REACH. WILL 
ENDORSE TO NIGHT FOR CONTINUITY OF CARE.

## 2020-07-24 NOTE — NUR
MS RN NOTES



PT IN BED AND AWAKE.  PT NON-VERBAL AND OPENS EYES.  RESPIRATIONS EVEN AND UNLABORED WITH NO 
S/S OF ACUTE DISTRESS OR SOB NOTED THROUGHOUT SHIFT.  PT KEPT CLEAN, DRY, AND COMFORTABLE.  
NO S/S OF PAIN AT THIS TIME.  PT NOTED WITH IV ON RTHUMB #24G PATENT AND INTACT INFUSING D5 
1/2 NS @75CC/HR.  SAFETY MEASURES IN PLACE WITH BED IN LOWEST LOCKED POSITION WITH SIDE 
RAILS UP X2.  CALL LIGHT WITHIN REACH.  WILL ENDORSE TO ONCOMING NURSE FOR OCC.

## 2020-07-24 NOTE — NUR
MS RN NOTES



SPOKE TO FAMILY AND FAMILY STATED THAT SHE WANTS TO HAVE HER MOM GIVEN MEDICATION IN THE AM 
FOR CONSTIPATION.  OFFERED TO GIVE TO PT AT THIS TIME.  FAMILY STATED THAT WE SHOULD JUST 
WAIT FOR THE MORNING.  WILL CONTINUE TO MONITOR.

## 2020-07-24 NOTE — NUR
MS RN NOTES

PATIENT IN BED EYES CLOSED, RESPOND TO VERBAL AND TACTILE STIMULI, OPENS EYES. NO ACUTE 
DISTRESS NOTED. BREATHING UNLABORED. NO SOB NOTED. IV ACCESS PATENT AND INTACT, NO REDNESS, 
NO SWELLING NOTED. SAFETY MEASURES IN PLACE. CALL LIGHT WITHIN REACH. WILL CONTINUE TO 
MONITOR ACCORDINGLY.

## 2020-07-25 VITALS — SYSTOLIC BLOOD PRESSURE: 154 MMHG | DIASTOLIC BLOOD PRESSURE: 94 MMHG

## 2020-07-25 VITALS — DIASTOLIC BLOOD PRESSURE: 82 MMHG | SYSTOLIC BLOOD PRESSURE: 141 MMHG

## 2020-07-25 VITALS — DIASTOLIC BLOOD PRESSURE: 64 MMHG | SYSTOLIC BLOOD PRESSURE: 127 MMHG

## 2020-07-25 VITALS — SYSTOLIC BLOOD PRESSURE: 129 MMHG | DIASTOLIC BLOOD PRESSURE: 73 MMHG

## 2020-07-25 LAB
BASE EXCESS BLDA CALC-SCNC: 1.8 MMOL/L
DO-HGB MFR BLDA: 317.9 MMHG
INHALED O2 CONCENTRATION: 60 %
PCO2 TEMP ADJ BLDA: 44.6 MMHG (ref 35–45)
PH TEMP ADJ BLDA: 7.4 [PH] (ref 7.35–7.45)
PO2 TEMP ADJ BLDA: 60.8 MMHG (ref 75–100)
SAO2 % BLDA: 91.4 % (ref 92–98.5)
VENTILATION MODE VENT: (no result)

## 2020-07-25 RX ADMIN — DOCUSATE SODIUM SCH MG: 250 CAPSULE, LIQUID FILLED ORAL at 09:21

## 2020-07-25 RX ADMIN — DOCUSATE SODIUM SCH MG: 250 CAPSULE, LIQUID FILLED ORAL at 16:54

## 2020-07-25 RX ADMIN — Medication SCH ML: at 13:51

## 2020-07-25 RX ADMIN — ENOXAPARIN SODIUM SCH MG: 40 INJECTION SUBCUTANEOUS at 16:53

## 2020-07-25 RX ADMIN — PANTOPRAZOLE SODIUM SCH MG: 40 GRANULE, DELAYED RELEASE ORAL at 07:24

## 2020-07-25 RX ADMIN — LEVETIRACETAM SCH MG: 100 SOLUTION ORAL at 09:21

## 2020-07-25 RX ADMIN — NITROFURANTOIN MONOHYDRATE AND NITROFURANTOIN, MACROCRYSTALLINE SCH MG: 75; 25 CAPSULE ORAL at 09:21

## 2020-07-25 RX ADMIN — LEVETIRACETAM SCH MG: 100 SOLUTION ORAL at 22:00

## 2020-07-25 RX ADMIN — NITROFURANTOIN MONOHYDRATE AND NITROFURANTOIN, MACROCRYSTALLINE SCH MG: 75; 25 CAPSULE ORAL at 22:00

## 2020-07-25 RX ADMIN — Medication SCH ML: at 16:59

## 2020-07-25 NOTE — NUR
MS RN NOTES



PT IN BED AND RESTING.  PT NON-VERBAL AND OPENS EYES.  RESPIRATIONS EVEN AND UNLABORED WITH 
NO S/S OF ACUTE DISTRESS OR SOB NOTED ON 2L VIA NC.  NO S/S OF PAIN AT THIS TIME.  PT NOTED 
WITH IV ON LEJ PATENT AND INTACT AND SL.  SAFETY MEASURES IN PLACE WITH BED IN LOWEST LOCKED 
POSITION WITH SIDE RAILS UP X2.  PT KEPT CLEAN DRY, AND COMFORTABLE.  CALL LIGHT WITHIN 
REACH.  WILL ENDORSE TO ONCOMING NURSE FOR STACEY..

## 2020-07-25 NOTE — NUR
MS RN NOTES

PATIENT IN BED EYES CLOSED, RESPOND TO VERBAL AND TACTILE STIMULI, OPENS EYES. NO ACUTE 
DISTRESS NOTED. BREATHING UNLABORED. ON 02 AT 2LPM VIA NASAL CANNULA SATURATING AT 98%, NO 
SOB NOTED. IV ACCESS PATENT AND INTACT, NO REDNESS, NO SWELLING NOTED. NEEDS ATTENDED AND 
ANTICIPATED. KEPT COMFORTABLE. HEAD OF BED ELEVATED. SAFETY MEASURES IN PLACE. CALL LIGHT 
WITHIN REACH. WILL ENDORSE TO NIGHT FOR CONTINUITY OF CARE.

## 2020-07-25 NOTE — NUR
MS/RN OPENING NOTES 



RECEIVED PATIENT RESTING IN BED. PATIENT EYES OPEN, ALERT AND ORIENTED X 1, NON VERBAL. 
PATIENT IS IN NO DISTRESS AT THE MOMENT. NO SIGNS OF SOB OR RESPIRATORY DISTRESS NOTED. 
PATIENT IS ON 2L OF OXYGEN VIA NC STAT AT 95%. PATIENT HAS EJ #20 G SL IN PLACE AND INTACT. 
HEAD OF THE BED IS POSITION ABOVE 40 DEGREES. PATIENT COMFORTABLE AT THIS TIME. SAFETY 
MEASURES ARE IN PLACE, BED IS LOCKED AND PLACED IN THE LOWEST POSITION WITH ALARM ON AND 
SIDE RAILS UP X 2 PADDED. CALL LIGHT IS WITHIN REACH. WILL CONTINUE TO MONITOR THROUGH OUT 
SHIFT.

## 2020-07-25 NOTE — NUR
MS RN NOTES

PATIENT IN BED AWAKE,  NO ACUTE DISTRESS NOTED. BREATHING UNLABORED. ON 02 AT 2LPM VIA NASAL 
CANNULA SATURATING AT 99%, NO SOB NOTED. IV ACCESS PATENT AND INTACT, NO REDNESS, NO 
SWELLING NOTED. HEAD OF BED ELEVATED. SAFETY MEASURES IN PLACE. CALL LIGHT WITHIN REACH. 
WILL CONTINUE TO MONITOR ACCORDINGLY.

## 2020-07-25 NOTE — NUR
MS RN NOTES



PT NOTED WITH LABORED BREATHING/CONGESTION/DESAT.  RT NOTIFIED, PT PUT ON SIMPLE MASK 10L 
O2, PT SATING 94-95%, AND FLUIDS STOPPED.  MD NOTIFIED.  NEW ORDERS FOR STAT ABG AND CHEST 
XRAY, HOLD FLUIDS.  WILL CONTINUE TO MONITOR.

## 2020-07-25 NOTE — NUR
MS RN NOTES



PT SUCTION BY RT WITH CLEAR WATER-LIKE MOST FLUID AND SOME THICK SECRETIONS.  MD NOTIFIED.  
NEW OREDER FOR 40MG LASIX IV X1.  WILL CONTINUE TO MONITOR.

## 2020-07-25 NOTE — NUR
MS RN NOTES



PT CXR AND ABG DONE AND RESULTED.  MD MADE AWARE.  NO NEW ORDERS.  WILL CONTINUE TO MONITOR.

## 2020-07-26 VITALS — SYSTOLIC BLOOD PRESSURE: 116 MMHG | DIASTOLIC BLOOD PRESSURE: 92 MMHG

## 2020-07-26 VITALS — DIASTOLIC BLOOD PRESSURE: 64 MMHG | SYSTOLIC BLOOD PRESSURE: 108 MMHG

## 2020-07-26 VITALS — SYSTOLIC BLOOD PRESSURE: 88 MMHG | DIASTOLIC BLOOD PRESSURE: 63 MMHG

## 2020-07-26 RX ADMIN — Medication SCH ML: at 10:01

## 2020-07-26 RX ADMIN — NITROFURANTOIN MONOHYDRATE AND NITROFURANTOIN, MACROCRYSTALLINE SCH MG: 75; 25 CAPSULE ORAL at 10:01

## 2020-07-26 RX ADMIN — DOCUSATE SODIUM SCH MG: 250 CAPSULE, LIQUID FILLED ORAL at 10:01

## 2020-07-26 RX ADMIN — LEVETIRACETAM SCH MG: 100 SOLUTION ORAL at 21:53

## 2020-07-26 RX ADMIN — PANTOPRAZOLE SODIUM SCH MG: 40 GRANULE, DELAYED RELEASE ORAL at 10:01

## 2020-07-26 RX ADMIN — LEVETIRACETAM SCH MG: 100 SOLUTION ORAL at 10:01

## 2020-07-26 RX ADMIN — Medication SCH ML: at 13:00

## 2020-07-26 RX ADMIN — ENOXAPARIN SODIUM SCH MG: 40 INJECTION SUBCUTANEOUS at 17:49

## 2020-07-26 RX ADMIN — DOCUSATE SODIUM SCH MG: 250 CAPSULE, LIQUID FILLED ORAL at 17:48

## 2020-07-26 RX ADMIN — Medication SCH ML: at 17:00

## 2020-07-26 NOTE — NUR
MS/RN  NOTE



THE PATIENT AWAKE AND RESPONSIVE TO TACTILE STIMULI. THE PATIENT IN NO APPARENT DISTRESS. 
RECEIVING OXYGEN AT 2L/MIN VIA NASAL CANNULA AND SATURATION IS AT 95%. PATIENT IS IN NO 
APPARENT DISTRESS. LEFT EJ G 20 PATENT AND SALINE LOCKED. BED LOW AND LOCKED. SIDE RAILS UP 
X3. CALL LIGHT WITHIN REACH. WILL ENDORSE TO NIGHT SHIFT.

## 2020-07-26 NOTE — NUR
MS/RN OPENING NOTES 



RECEIVED PATIENT AWAKE IN BED. ALERT AND ORIENTED X 1, EYES OPEN. PATIENT IS IN NO SIGNS OF 
DISTRESS. NO SOB OR RESPIRATORY DISTRESS NOTED. PATIENT ON 2L OF OXYGEN TOLERATING WELL AT 
95%. PATIENT HAS EJ #20 SL INTACT. SAFETY MEASURES ARE IN PLACE, BED IS LOCKED AND PLACED IN 
THE LOWEST POSITION WITH SIDE RAILS UP X 2, CALL LIGHT IS WITHIN REACH, WILL CONTINUE TO 
MONITOR PATIENT.

## 2020-07-26 NOTE — NUR
MS/RN  NOTE



THE PATIENT IS RECEIVED IN BED. THE PATIENT ASLEEP, RESPONSIVE TO TACTILE STIMULI BY OPENING 
EYES. PATIENT NON-VERBAL. LEFT EJ G 20 PATENT AND SALINE LOCKED. RECEIVING OXYGEN AT 2L/MIN 
VIA NASAL CANNULA. NO MANIFESTATION OF DISTRESS NOTED. BED LOW AND LOCKED. SIDE RAILS UP X3. 
CALL LIGHT WITHIN REACH. WILL CONTINUE TO MONITOR.

## 2020-07-26 NOTE — NUR
MS/RN CLOSING NOTES 



PATIENT SLEEPING IN BED. PATIENT EYES OPEN, ALERT AND ORIENTED X 1, NON VERBAL. PATIENT IS 
IN NO DISTRESS AT THE MOMENT. NO SIGNS OF SOB OR RESPIRATORY DISTRESS NOTED. PATIENT IS ON 
2L OF OXYGEN VIA NC STAT AT 95%. PATIENT HAS EJ #20 G SL IN PLACE AND INTACT. HEAD OF THE 
BED IS POSITION ABOVE 40 DEGREES. PATIENT COMFORTABLE AT THIS TIME. ORAL CARE HAS BEEN 
PERFORMED DURING SHIFT. PATIENT DAUGHTER CALLS THROUGH OUT SHIFT TO CHECK ON PATIENT. ALL 
PATIENTS NEEDS HAVE BEEN MET DURING SHIFT. SAFETY MEASURES ARE IN PLACE, BED IS LOCKED AND 
PLACED IN THE LOWEST POSITION WITH ALARM ON AND SIDE RAILS UP X 2 PADDED. CALL LIGHT IS 
WITHIN REACH. WILL ENDORSE CARE TO DAY SHIFT.

## 2020-07-27 VITALS — DIASTOLIC BLOOD PRESSURE: 58 MMHG | SYSTOLIC BLOOD PRESSURE: 125 MMHG

## 2020-07-27 RX ADMIN — Medication SCH ML: at 12:39

## 2020-07-27 RX ADMIN — PANTOPRAZOLE SODIUM SCH MG: 40 GRANULE, DELAYED RELEASE ORAL at 08:25

## 2020-07-27 RX ADMIN — Medication SCH ML: at 08:22

## 2020-07-27 RX ADMIN — DOCUSATE SODIUM SCH MG: 250 CAPSULE, LIQUID FILLED ORAL at 08:25

## 2020-07-27 RX ADMIN — LEVETIRACETAM SCH MG: 100 SOLUTION ORAL at 08:25

## 2020-07-27 NOTE — NUR
m/s lvn: notes



am care rendered by cna. kept clean and dry. turned and repositioned. daughter called and 
wants to make sure she eats lunch before she leaves. awaiting order from dr. rowell re: 
discharge home. case management aware and gotten a verbal from dr. rowell re: pt going to be 
discharge today per jhon (fauzia). cn made aware.

## 2020-07-27 NOTE — NUR
MS/RN CLOSING NOTES 



PATIENT SLEEPING IN BED. ALERT AND ORIENTED X 1, EYES OPEN TO TOUCH AND SOUND. PATIENT IS IN 
NO SIGNS OF DISTRESS. NO SOB OR RESPIRATORY DISTRESS NOTED. PATIENT ON 2L OF OXYGEN 
TOLERATING WELL AT 95%. PATIENT HAS EJ #20 SL INTACT AND FLUSHING WELL. PATIENT WAS ABLE TO 
TOLERATE SMALL AMOUNTS OF FLUID PO. ORAL CARE HAS BEEN PERFORMED DURING SHIFT. ALL PATIENTS 
NEEDS HAVE BEEN MET.SAFETY MEASURES ARE IN PLACE, BED IS LOCKED AND PLACED IN THE LOWEST 
POSITION WITH SIDE RAILS UP X 2, CALL LIGHT IS WITHIN REACH, WILL CONTINUE TO MONITOR 
PATIENT.

## 2020-07-27 NOTE — NUR
m/s lvn: initial assessment



received pt in bed awake, non-verbal, unable to comprehend. hob elevated. for d'c planning 
home under hospice care. case management making arrangement. will continue to monitor.

## 2020-07-27 NOTE — NUR
m/s lvn: notes



ambulance here and report given to one of the crew. h/l removed to left ej with tip intact. 
pm care rendered. kept clean and dry.

## 2020-07-27 NOTE — NUR
m/s lvn: discharged



discharged home in stable condition with all d'c papers and belongings accompanied by 2 emt.

## 2020-07-27 NOTE — NUR
m/s lvn: notes



pt unable to sign d'c papers due to cognitive impairment. 2 license staff signed all d'c 
papers and copy to be given to family when discharge home.

## 2020-07-27 NOTE — NUR
m/s lvn: notes



jhon (fauzia) called and pt will be leaving at 1500, just waiting on dme to deliver to her 
house. daughter aware and will receive the pt per case management.

## 2020-07-27 NOTE — NUR
m/s lvn: notes



dr. rowell notifiied and made aware that pt is leaving at 1500 with telephone order to 
discharge pt home and f/u with home hospice. order read back and carried out and 
acknowledged.

## 2022-03-28 NOTE — NUR
Assessment/Plan:             Problem List Items Addressed This Visit        Digestive    Idiopathic chronic pancreatitis ([address] )    GERD (gastroesophageal reflux disease)      Other Visit Diagnoses     Dysphagia, unspecified type    -  Primary            Subjective:      Patient ID: Jersey Gonzales is a 60 y o  female    1  Chest pressure over past 48- no different activity- was placed on prednisone last week for breathing problems   then started with swallowing diffiuclty in past 2 days -ate saltines and had midchest pain then into her stomach  Has hx of GERD- has zofran prn and omeprazole daily - took but still ahd some pain radiating through to back this am  Has not eaten any solids- having some tea - feel pains  With eating  no change in color of BM  2  Had fall early in month then in er and now seen by Dr Frank Burk- may need surgery on right hand- currently has splint in place  3  uti- was in ER on cephalexin - ecoli on cx which was sensitive to cephalexin    Will refer to GI- concerned about possible  Stricture- will have them evaluated  For this- other possibilities include thrush/ esophagitis  from recent prednisone for respiratory infection and recent antibiotics for uti (from ER) but no evidence of oral involvement on exam    has hx of pancreatitis  But not having significant pain on exam at present=  Will check lab      The following portions of the patient's history were reviewed and updated as appropriate: allergies, current medications and problem list      Review of Systems   Constitutional: Positive for appetite change  Negative for chills and fever  HENT: Positive for trouble swallowing  Respiratory: Positive for cough  Had one episode of vomiting last week with cough- none since then   All other systems reviewed and are negative          Objective:      Current Outpatient Medications:     albuterol (Ventolin HFA) 90 mcg/act inhaler, Inhale 2 puffs every 6 (six) hours as needed for M/S RN NOTES



[facility] STILL WAITING, SCHEDULE AT 7 PM PER NURSING SUPERVISOR. CALLED NOEL FROM THE 
PHARMACY, INFORMED REGARDING THE IV ATB [facility] DUE AT 1700, HE STATED TO CALL PHARMACY ONCE 
[facility] CONNECTED. WILL CONTINUE TO MONITOR wheezing or shortness of breath, Disp: 8 5 g, Rfl: 3    AMILoride 5 mg tablet, Take 1 tablet (5 mg total) by mouth 2 (two) times a day, Disp: 180 tablet, Rfl: 3    aspirin 81 mg chewable tablet, Chew 1 tablet (81 mg total) daily, Disp: , Rfl:     carvedilol (COREG) 25 mg tablet, Take 1 tablet (25 mg total) by mouth 2 (two) times a day with meals, Disp: 180 tablet, Rfl: 3    cholecalciferol (VITAMIN D3) 1,000 units tablet, Take 5 tablets (5,000 Units total) by mouth daily, Disp: 90 tablet, Rfl: 5    clonazePAM (KlonoPIN) 0 5 mg tablet, Take 1 tablet (0 5 mg total) by mouth 3 (three) times a day, Disp: 270 tablet, Rfl: 0    fluocinonide (LIDEX) 0 05 % ointment, Apply topically 2 (two) times a day (Patient taking differently: Apply topically if needed  ), Disp: 60 g, Rfl: 1    fluvoxaMINE (LUVOX) 100 mg tablet, 2 (two) times a day 1 tab am , 2 tabs HS, Disp: , Rfl:     HYDROcodone-acetaminophen (NORCO) 5-325 mg per tablet, Take 1 tablet by mouth every 8 (eight) hours as needed (moderate-severe headache pain) for up to 10 doses Max Daily Amount: 3 tablets, Disp: 10 tablet, Rfl: 0    lamoTRIgine (LaMICtal) 200 MG tablet, Take 200 mg by mouth 2 (two) times a day , Disp: , Rfl:     omeprazole (PriLOSEC) 40 MG capsule, Take 1 capsule (40 mg total) by mouth daily before breakfast, Disp: 90 capsule, Rfl: 3    ondansetron (ZOFRAN) 4 mg tablet, Take 1 tablet (4 mg total) by mouth every 8 (eight) hours as needed for nausea or vomiting, Disp: 20 tablet, Rfl: 1    Polyethylene Glycol 3350 (MIRALAX PO), Take by mouth as needed , Disp: , Rfl:     QUEtiapine (SEROquel) 100 mg tablet, Take 1 tablet by mouth daily after breakfast , Disp: , Rfl:     QUEtiapine (SEROquel) 300 mg tablet, 1 at bedtime along with a 400 mg tab, Disp: , Rfl:     QUEtiapine (SEROquel) 400 MG tablet, 1 at bedtime along with a 300 mg tab , Disp: , Rfl:     rosuvastatin (CRESTOR) 20 MG tablet, Take 1 tablet (20 mg total) by mouth daily, Disp: 90 tablet, Rfl: 1    budesonide-formoterol (SYMBICORT) 160-4 5 mcg/act inhaler, Inhale 2 puffs 2 (two) times a day Rinse mouth after use  (Patient not taking: Reported on 2/14/2022 ), Disp: 30 6 g, Rfl: 3    linaCLOtide (Linzess) 290 MCG CAPS, Take 1 capsule by mouth daily, Disp: 90 capsule, Rfl: 0    predniSONE 10 mg tablet, Take 3 tabs  For 3 days, then 2 tabs  For 3 days, then 1 tab  Daily until completed  (Patient not taking: Reported on 3/28/2022 ), Disp: 20 tablet, Rfl: 2    Blood pressure 148/86, pulse 79, height 5' 7" (1 702 m), weight 102 kg (225 lb 6 4 oz), SpO2 99 %, not currently breastfeeding  Physical Exam  Vitals and nursing note reviewed  HENT:      Right Ear: Tympanic membrane normal  There is no impacted cerumen  Left Ear: Tympanic membrane normal  There is no impacted cerumen  Mouth/Throat:      Mouth: Mucous membranes are dry  Pharynx: No posterior oropharyngeal erythema  Eyes:      General: No scleral icterus  Right eye: No discharge  Left eye: No discharge  Cardiovascular:      Rate and Rhythm: Normal rate and regular rhythm  Heart sounds: No murmur heard  Pulmonary:      Breath sounds: No wheezing  Comments: Minimal proonged exp phase  Abdominal:      Palpations: Abdomen is soft  Tenderness: There is no abdominal tenderness  There is no guarding  Comments: No guarding - no epigastric tenderness   Musculoskeletal:         General: No swelling or tenderness  Neurological:      Mental Status: She is alert and oriented to person, place, and time     Psychiatric:         Mood and Affect: Mood normal

## 2023-08-28 NOTE — NUR
RN CLOSING NOTES



PATIENT IN STABLE CONDITION. ALL NEEDS ATTENDED AND PROVIDED. ALL DUE MEDICATIONS GIVEN AS 
ORDERED. KEPT PATIENT SAFE AND COMFORTABLE. BED IN LOW/LOCKED POSITION, SIDERAILS UPX2, CALL 
LIGHT IN REACH. WILL ENDORSED TO NIGHT RN FOR STACEY. Odomzo Counseling- I discussed with the patient the risks of Odomzo including but not limited to nausea, vomiting, diarrhea, constipation, weight loss, changes in the sense of taste, decreased appetite, muscle spasms, and hair loss.  The patient verbalized understanding of the proper use and possible adverse effects of Odomzo.  All of the patient's questions and concerns were addressed.

## 2024-01-05 NOTE — NUR
MS RN CLOSING NOTE



PATIENT IN BED AWAKE.FAMILY AT BEDSIDE. NO SOB OR ACUTE DISTRESS NOTED. BREATHING EVEN AND 
UNLABORED.ON O2 2L VIA NASAL CANULA. R HAND #22 IV INTACT AND PATENT. SKIN COLOR WNL. BED IS 
LOW AND IN LOCKED POSITION. CALL LIGHT WITHIN REACH. SRX4.SAFETY PRECAUTIONS IN 
PLACE.ENDORSED TO PM NURSE FOR STACEY. No

## 2024-04-05 NOTE — NUR
MS RN NOTES



RECEIVED PATIENT VIA GURNEY FROM [facility]. PATIENT IS NONVERBAL. ON 4L NASAL CANULA, NO SOB/ 
ACUTE RESPIRATORY DISTRESS NOTED. SHOWS NO SIGNS OF PAIN OR DISTRESS. IV IN R HAND #24G IS 
PATENT AND INTACT RUNNING D5 1/2 NS @75MLS/HR. BED IS IN LOWEST LOCKED POSITION WITH SIDE 
RAILS UP X3, SEMI FOWLERS. SIDE RAILS ARE PADDED FOR SEIZURE PRECAUTIONS. CALL LIGHT IS 
WITHIN REACH. WILL CONTINUE TO MONITOR. show